# Patient Record
Sex: MALE | Race: WHITE | Employment: FULL TIME | URBAN - METROPOLITAN AREA
[De-identification: names, ages, dates, MRNs, and addresses within clinical notes are randomized per-mention and may not be internally consistent; named-entity substitution may affect disease eponyms.]

---

## 2017-01-05 ENCOUNTER — TELEPHONE (OUTPATIENT)
Dept: BARIATRICS/WEIGHT MGMT | Age: 52
End: 2017-01-05

## 2017-01-05 ENCOUNTER — TELEPHONE (OUTPATIENT)
Dept: FAMILY MEDICINE CLINIC | Age: 52
End: 2017-01-05

## 2017-01-06 ENCOUNTER — OFFICE VISIT (OUTPATIENT)
Dept: FAMILY MEDICINE CLINIC | Age: 52
End: 2017-01-06

## 2017-01-06 VITALS
DIASTOLIC BLOOD PRESSURE: 85 MMHG | HEIGHT: 70 IN | BODY MASS INDEX: 44.24 KG/M2 | HEART RATE: 63 BPM | SYSTOLIC BLOOD PRESSURE: 140 MMHG | OXYGEN SATURATION: 99 % | RESPIRATION RATE: 16 BRPM | WEIGHT: 309 LBS

## 2017-01-06 DIAGNOSIS — G47.33 OSA (OBSTRUCTIVE SLEEP APNEA): ICD-10-CM

## 2017-01-06 DIAGNOSIS — I89.0 LYMPHEDEMA: ICD-10-CM

## 2017-01-06 DIAGNOSIS — E11.69 DIABETES MELLITUS TYPE 2 IN OBESE (HCC): ICD-10-CM

## 2017-01-06 DIAGNOSIS — I10 ESSENTIAL HYPERTENSION: ICD-10-CM

## 2017-01-06 DIAGNOSIS — Z98.84 S/P LAPAROSCOPIC SLEEVE GASTRECTOMY: ICD-10-CM

## 2017-01-06 DIAGNOSIS — E66.9 DIABETES MELLITUS TYPE 2 IN OBESE (HCC): ICD-10-CM

## 2017-01-06 DIAGNOSIS — E66.01 OBESITY, MORBID, BMI 50 OR HIGHER (HCC): Primary | ICD-10-CM

## 2017-01-06 PROCEDURE — 99214 OFFICE O/P EST MOD 30 MIN: CPT | Performed by: FAMILY MEDICINE

## 2017-01-16 RX ORDER — GABAPENTIN 300 MG/1
CAPSULE ORAL
Qty: 90 CAPSULE | Refills: 4 | Status: SHIPPED | OUTPATIENT
Start: 2017-01-16 | End: 2017-05-30 | Stop reason: SDUPTHER

## 2017-02-02 ENCOUNTER — OFFICE VISIT (OUTPATIENT)
Dept: BARIATRICS/WEIGHT MGMT | Age: 52
End: 2017-02-02

## 2017-02-02 VITALS
BODY MASS INDEX: 43.67 KG/M2 | RESPIRATION RATE: 16 BRPM | SYSTOLIC BLOOD PRESSURE: 123 MMHG | WEIGHT: 305 LBS | DIASTOLIC BLOOD PRESSURE: 72 MMHG | HEIGHT: 70 IN | HEART RATE: 68 BPM

## 2017-02-02 DIAGNOSIS — G47.33 OSA (OBSTRUCTIVE SLEEP APNEA): ICD-10-CM

## 2017-02-02 DIAGNOSIS — E66.9 DIABETES MELLITUS TYPE 2 IN OBESE (HCC): ICD-10-CM

## 2017-02-02 DIAGNOSIS — E11.69 DIABETES MELLITUS TYPE 2 IN OBESE (HCC): ICD-10-CM

## 2017-02-02 DIAGNOSIS — E66.01 MORBID OBESITY WITH BMI OF 45.0-49.9, ADULT (HCC): Primary | ICD-10-CM

## 2017-02-02 DIAGNOSIS — Z98.84 S/P LAPAROSCOPIC SLEEVE GASTRECTOMY: ICD-10-CM

## 2017-02-02 PROCEDURE — 99024 POSTOP FOLLOW-UP VISIT: CPT | Performed by: SURGERY

## 2017-03-13 ENCOUNTER — OFFICE VISIT (OUTPATIENT)
Dept: BARIATRICS/WEIGHT MGMT | Age: 52
End: 2017-03-13

## 2017-03-13 VITALS
BODY MASS INDEX: 41.59 KG/M2 | HEART RATE: 60 BPM | RESPIRATION RATE: 16 BRPM | DIASTOLIC BLOOD PRESSURE: 70 MMHG | HEIGHT: 70 IN | WEIGHT: 290.5 LBS | SYSTOLIC BLOOD PRESSURE: 136 MMHG

## 2017-03-13 DIAGNOSIS — Z98.84 S/P LAPAROSCOPIC SLEEVE GASTRECTOMY: Primary | ICD-10-CM

## 2017-03-13 DIAGNOSIS — E11.69 DIABETES MELLITUS TYPE 2 IN OBESE (HCC): ICD-10-CM

## 2017-03-13 DIAGNOSIS — I10 ESSENTIAL HYPERTENSION: ICD-10-CM

## 2017-03-13 DIAGNOSIS — G47.33 OSA (OBSTRUCTIVE SLEEP APNEA): ICD-10-CM

## 2017-03-13 DIAGNOSIS — E66.01 MORBID OBESITY WITH BMI OF 45.0-49.9, ADULT (HCC): ICD-10-CM

## 2017-03-13 DIAGNOSIS — E66.9 DIABETES MELLITUS TYPE 2 IN OBESE (HCC): ICD-10-CM

## 2017-03-13 PROCEDURE — 99024 POSTOP FOLLOW-UP VISIT: CPT | Performed by: NURSE PRACTITIONER

## 2017-03-13 ASSESSMENT — ENCOUNTER SYMPTOMS
GASTROINTESTINAL NEGATIVE: 1
RESPIRATORY NEGATIVE: 1
EYES NEGATIVE: 1
ALLERGIC/IMMUNOLOGIC NEGATIVE: 1

## 2017-05-01 ENCOUNTER — OFFICE VISIT (OUTPATIENT)
Dept: FAMILY MEDICINE CLINIC | Age: 52
End: 2017-05-01

## 2017-05-01 VITALS
WEIGHT: 268 LBS | HEIGHT: 70 IN | SYSTOLIC BLOOD PRESSURE: 136 MMHG | OXYGEN SATURATION: 98 % | BODY MASS INDEX: 38.37 KG/M2 | RESPIRATION RATE: 14 BRPM | HEART RATE: 68 BPM | DIASTOLIC BLOOD PRESSURE: 80 MMHG

## 2017-05-01 DIAGNOSIS — E11.69 DIABETES MELLITUS TYPE 2 IN OBESE (HCC): Primary | ICD-10-CM

## 2017-05-01 DIAGNOSIS — Z98.84 S/P LAPAROSCOPIC SLEEVE GASTRECTOMY: ICD-10-CM

## 2017-05-01 DIAGNOSIS — I89.0 LYMPHEDEMA: ICD-10-CM

## 2017-05-01 DIAGNOSIS — G47.33 OSA (OBSTRUCTIVE SLEEP APNEA): ICD-10-CM

## 2017-05-01 DIAGNOSIS — E66.9 DIABETES MELLITUS TYPE 2 IN OBESE (HCC): Primary | ICD-10-CM

## 2017-05-01 DIAGNOSIS — I10 ESSENTIAL HYPERTENSION: ICD-10-CM

## 2017-05-01 DIAGNOSIS — E66.01 MORBID OBESITY WITH BMI OF 40.0-44.9, ADULT (HCC): ICD-10-CM

## 2017-05-01 LAB — HBA1C MFR BLD: 4.7 %

## 2017-05-01 PROCEDURE — 83036 HEMOGLOBIN GLYCOSYLATED A1C: CPT | Performed by: FAMILY MEDICINE

## 2017-05-01 PROCEDURE — 99214 OFFICE O/P EST MOD 30 MIN: CPT | Performed by: FAMILY MEDICINE

## 2017-05-30 RX ORDER — GABAPENTIN 300 MG/1
CAPSULE ORAL
Qty: 90 CAPSULE | Refills: 4 | Status: SHIPPED | OUTPATIENT
Start: 2017-05-30 | End: 2017-10-15 | Stop reason: SDUPTHER

## 2017-06-14 ENCOUNTER — OFFICE VISIT (OUTPATIENT)
Dept: BARIATRICS/WEIGHT MGMT | Age: 52
End: 2017-06-14

## 2017-06-14 VITALS
DIASTOLIC BLOOD PRESSURE: 70 MMHG | HEART RATE: 60 BPM | SYSTOLIC BLOOD PRESSURE: 120 MMHG | HEIGHT: 70 IN | WEIGHT: 257 LBS | RESPIRATION RATE: 16 BRPM | BODY MASS INDEX: 36.79 KG/M2

## 2017-06-14 DIAGNOSIS — E66.9 OBESITY (BMI 30-39.9): ICD-10-CM

## 2017-06-14 DIAGNOSIS — E11.69 DIABETES MELLITUS TYPE 2 IN OBESE (HCC): ICD-10-CM

## 2017-06-14 DIAGNOSIS — E66.9 DIABETES MELLITUS TYPE 2 IN OBESE (HCC): ICD-10-CM

## 2017-06-14 DIAGNOSIS — I10 ESSENTIAL HYPERTENSION: ICD-10-CM

## 2017-06-14 DIAGNOSIS — Z98.84 S/P LAPAROSCOPIC SLEEVE GASTRECTOMY: Primary | ICD-10-CM

## 2017-06-14 PROCEDURE — 99213 OFFICE O/P EST LOW 20 MIN: CPT | Performed by: NURSE PRACTITIONER

## 2017-06-14 ASSESSMENT — ENCOUNTER SYMPTOMS
ALLERGIC/IMMUNOLOGIC NEGATIVE: 1
RESPIRATORY NEGATIVE: 1
GASTROINTESTINAL NEGATIVE: 1
EYES NEGATIVE: 1

## 2017-06-21 ENCOUNTER — HOSPITAL ENCOUNTER (OUTPATIENT)
Dept: OTHER | Age: 52
Discharge: HOME | End: 2017-06-21
Attending: NURSE PRACTITIONER | Admitting: NURSE PRACTITIONER

## 2017-06-21 DIAGNOSIS — Z98.84 S/P LAPAROSCOPIC SLEEVE GASTRECTOMY: ICD-10-CM

## 2017-06-21 DIAGNOSIS — E66.9 OBESITY (BMI 30-39.9): ICD-10-CM

## 2017-06-21 DIAGNOSIS — E66.9 DIABETES MELLITUS TYPE 2 IN OBESE (HCC): ICD-10-CM

## 2017-06-21 DIAGNOSIS — E11.69 DIABETES MELLITUS TYPE 2 IN OBESE (HCC): ICD-10-CM

## 2017-06-21 LAB
A/G RATIO: 1.2 (ref 1.1–2.2)
ALBUMIN SERPL-MCNC: 3.9 G/DL (ref 3.4–5)
ALP BLD-CCNC: 102 U/L (ref 40–129)
ALT SERPL-CCNC: 10 U/L (ref 10–40)
ANION GAP SERPL CALCULATED.3IONS-SCNC: 13 MMOL/L (ref 3–16)
AST SERPL-CCNC: 16 U/L (ref 15–37)
BASOPHILS ABSOLUTE: 0 K/UL (ref 0–0.2)
BASOPHILS RELATIVE PERCENT: 0.4 %
BILIRUB SERPL-MCNC: 1.3 MG/DL (ref 0–1)
BUN BLDV-MCNC: 17 MG/DL (ref 7–20)
CALCIUM SERPL-MCNC: 9.1 MG/DL (ref 8.3–10.6)
CHLORIDE BLD-SCNC: 101 MMOL/L (ref 99–110)
CHOLESTEROL, TOTAL: 106 MG/DL (ref 0–199)
CO2: 25 MMOL/L (ref 21–32)
CREAT SERPL-MCNC: 0.8 MG/DL (ref 0.9–1.3)
EOSINOPHILS ABSOLUTE: 0.1 K/UL (ref 0–0.6)
EOSINOPHILS RELATIVE PERCENT: 1.3 %
FOLATE: 16.78 NG/ML (ref 4.78–24.2)
GFR AFRICAN AMERICAN: >60
GFR NON-AFRICAN AMERICAN: >60
GLOBULIN: 3.2 G/DL
GLUCOSE BLD-MCNC: 104 MG/DL (ref 70–99)
HCT VFR BLD CALC: 38.8 % (ref 40.5–52.5)
HDLC SERPL-MCNC: 42 MG/DL (ref 40–60)
HEMOGLOBIN: 12.8 G/DL (ref 13.5–17.5)
IRON SATURATION: 42 % (ref 20–50)
IRON: 132 UG/DL (ref 59–158)
LDL CHOLESTEROL CALCULATED: 52 MG/DL
LYMPHOCYTES ABSOLUTE: 1.6 K/UL (ref 1–5.1)
LYMPHOCYTES RELATIVE PERCENT: 29.2 %
MCH RBC QN AUTO: 31.9 PG (ref 26–34)
MCHC RBC AUTO-ENTMCNC: 32.9 G/DL (ref 31–36)
MCV RBC AUTO: 96.9 FL (ref 80–100)
MONOCYTES ABSOLUTE: 0.3 K/UL (ref 0–1.3)
MONOCYTES RELATIVE PERCENT: 5.9 %
NEUTROPHILS ABSOLUTE: 3.4 K/UL (ref 1.7–7.7)
NEUTROPHILS RELATIVE PERCENT: 63.2 %
PDW BLD-RTO: 12.5 % (ref 12.4–15.4)
PLATELET # BLD: 221 K/UL (ref 135–450)
PMV BLD AUTO: 7.8 FL (ref 5–10.5)
POTASSIUM SERPL-SCNC: 4.6 MMOL/L (ref 3.5–5.1)
RBC # BLD: 4 M/UL (ref 4.2–5.9)
SODIUM BLD-SCNC: 139 MMOL/L (ref 136–145)
TOTAL IRON BINDING CAPACITY: 311 UG/DL (ref 260–445)
TOTAL PROTEIN: 7.1 G/DL (ref 6.4–8.2)
TRIGL SERPL-MCNC: 59 MG/DL (ref 0–150)
TSH REFLEX: 0.73 UIU/ML (ref 0.27–4.2)
VITAMIN B-12: 361 PG/ML (ref 211–911)
VITAMIN D 25-HYDROXY: 37.6 NG/ML
VLDLC SERPL CALC-MCNC: 12 MG/DL
WBC # BLD: 5.5 K/UL (ref 4–11)

## 2017-06-29 ENCOUNTER — TELEPHONE (OUTPATIENT)
Dept: BARIATRICS/WEIGHT MGMT | Age: 52
End: 2017-06-29

## 2017-06-29 DIAGNOSIS — R17 ELEVATED BILIRUBIN: Primary | ICD-10-CM

## 2017-07-12 RX ORDER — VERAPAMIL HYDROCHLORIDE 120 MG/1
120 TABLET, FILM COATED ORAL DAILY
Qty: 30 TABLET | Refills: 5 | Status: SHIPPED | OUTPATIENT
Start: 2017-07-12 | End: 2018-01-29 | Stop reason: SDUPTHER

## 2017-07-19 ENCOUNTER — TELEPHONE (OUTPATIENT)
Dept: FAMILY MEDICINE CLINIC | Age: 52
End: 2017-07-19

## 2017-09-14 ENCOUNTER — OFFICE VISIT (OUTPATIENT)
Dept: CARDIOLOGY CLINIC | Age: 52
End: 2017-09-14

## 2017-09-14 ENCOUNTER — OFFICE VISIT (OUTPATIENT)
Dept: BARIATRICS/WEIGHT MGMT | Age: 52
End: 2017-09-14

## 2017-09-14 VITALS
WEIGHT: 235 LBS | HEIGHT: 70 IN | DIASTOLIC BLOOD PRESSURE: 76 MMHG | BODY MASS INDEX: 33.64 KG/M2 | HEART RATE: 60 BPM | RESPIRATION RATE: 16 BRPM | SYSTOLIC BLOOD PRESSURE: 128 MMHG

## 2017-09-14 VITALS
BODY MASS INDEX: 34.08 KG/M2 | WEIGHT: 237.5 LBS | DIASTOLIC BLOOD PRESSURE: 72 MMHG | HEART RATE: 61 BPM | SYSTOLIC BLOOD PRESSURE: 118 MMHG

## 2017-09-14 DIAGNOSIS — G47.33 OSA (OBSTRUCTIVE SLEEP APNEA): ICD-10-CM

## 2017-09-14 DIAGNOSIS — E66.9 OBESITY (BMI 30-39.9): ICD-10-CM

## 2017-09-14 DIAGNOSIS — E55.9 VITAMIN D DEFICIENCY: ICD-10-CM

## 2017-09-14 DIAGNOSIS — I10 ESSENTIAL HYPERTENSION: ICD-10-CM

## 2017-09-14 DIAGNOSIS — E11.69 DIABETES MELLITUS TYPE 2 IN OBESE (HCC): ICD-10-CM

## 2017-09-14 DIAGNOSIS — R17 ELEVATED BILIRUBIN: ICD-10-CM

## 2017-09-14 DIAGNOSIS — Z98.84 S/P LAPAROSCOPIC SLEEVE GASTRECTOMY: Primary | ICD-10-CM

## 2017-09-14 DIAGNOSIS — I47.20 VENTRICULAR TACHYCARDIA: Primary | ICD-10-CM

## 2017-09-14 DIAGNOSIS — E66.9 DIABETES MELLITUS TYPE 2 IN OBESE (HCC): ICD-10-CM

## 2017-09-14 LAB
ALBUMIN SERPL-MCNC: 4.2 G/DL (ref 3.4–5)
ALP BLD-CCNC: 106 U/L (ref 40–129)
ALT SERPL-CCNC: 11 U/L (ref 10–40)
AST SERPL-CCNC: 18 U/L (ref 15–37)
BILIRUB SERPL-MCNC: 1.4 MG/DL (ref 0–1)
BILIRUBIN DIRECT: 0.3 MG/DL (ref 0–0.3)
BILIRUBIN, INDIRECT: 1.1 MG/DL (ref 0–1)
TOTAL PROTEIN: 7 G/DL (ref 6.4–8.2)

## 2017-09-14 PROCEDURE — 93000 ELECTROCARDIOGRAM COMPLETE: CPT | Performed by: NURSE PRACTITIONER

## 2017-09-14 PROCEDURE — 99213 OFFICE O/P EST LOW 20 MIN: CPT | Performed by: NURSE PRACTITIONER

## 2017-09-14 ASSESSMENT — ENCOUNTER SYMPTOMS
BACK PAIN: 1
RESPIRATORY NEGATIVE: 1
CONSTIPATION: 1

## 2017-10-05 ENCOUNTER — TELEPHONE (OUTPATIENT)
Dept: BARIATRICS/WEIGHT MGMT | Age: 52
End: 2017-10-05

## 2017-10-16 RX ORDER — GABAPENTIN 300 MG/1
CAPSULE ORAL
Qty: 90 CAPSULE | Refills: 10 | Status: SHIPPED | OUTPATIENT
Start: 2017-10-16 | End: 2018-10-17 | Stop reason: SDUPTHER

## 2017-11-21 NOTE — TELEPHONE ENCOUNTER
Medication and Quantity requested: metFORMIN (GLUCOPHAGE) 500MG tablet  Qty 180    Last Visit  5/1/17    Pharmacy and phone number updated in [facility]:  yes

## 2017-11-21 NOTE — TELEPHONE ENCOUNTER
Last OV   01/06/2017 obesity foot swelling   Last Refill  06/15/2016 # 180 3 refills   Lab Results   Component Value Date    LABA1C 4.7 05/01/2017     Lab Results   Component Value Date    .4 12/03/2016

## 2017-12-07 ENCOUNTER — OFFICE VISIT (OUTPATIENT)
Dept: BARIATRICS/WEIGHT MGMT | Age: 52
End: 2017-12-07

## 2017-12-07 ENCOUNTER — HOSPITAL ENCOUNTER (OUTPATIENT)
Dept: OTHER | Age: 52
Discharge: HOME | End: 2017-12-07
Attending: SURGERY | Admitting: SURGERY

## 2017-12-07 VITALS
BODY MASS INDEX: 31.09 KG/M2 | RESPIRATION RATE: 16 BRPM | HEIGHT: 70 IN | DIASTOLIC BLOOD PRESSURE: 78 MMHG | SYSTOLIC BLOOD PRESSURE: 124 MMHG | HEART RATE: 88 BPM | WEIGHT: 217.2 LBS

## 2017-12-07 DIAGNOSIS — Z98.84 S/P LAPAROSCOPIC SLEEVE GASTRECTOMY: ICD-10-CM

## 2017-12-07 DIAGNOSIS — E66.9 OBESITY (BMI 30-39.9): Primary | ICD-10-CM

## 2017-12-07 DIAGNOSIS — E66.9 OBESITY (BMI 30-39.9): ICD-10-CM

## 2017-12-07 DIAGNOSIS — I10 ESSENTIAL HYPERTENSION: ICD-10-CM

## 2017-12-07 DIAGNOSIS — E66.9 DIABETES MELLITUS TYPE 2 IN OBESE (HCC): ICD-10-CM

## 2017-12-07 DIAGNOSIS — E11.69 DIABETES MELLITUS TYPE 2 IN OBESE (HCC): ICD-10-CM

## 2017-12-07 LAB
A/G RATIO: 1.4 (ref 1.1–2.2)
ALBUMIN SERPL-MCNC: 4 G/DL (ref 3.4–5)
ALP BLD-CCNC: 105 U/L (ref 40–129)
ALT SERPL-CCNC: 10 U/L (ref 10–40)
ANION GAP SERPL CALCULATED.3IONS-SCNC: 10 MMOL/L (ref 3–16)
AST SERPL-CCNC: 16 U/L (ref 15–37)
BASOPHILS ABSOLUTE: 0 K/UL (ref 0–0.2)
BASOPHILS RELATIVE PERCENT: 0.5 %
BILIRUB SERPL-MCNC: 1.5 MG/DL (ref 0–1)
BUN BLDV-MCNC: 16 MG/DL (ref 7–20)
CALCIUM SERPL-MCNC: 9.3 MG/DL (ref 8.3–10.6)
CHLORIDE BLD-SCNC: 105 MMOL/L (ref 99–110)
CHOLESTEROL, TOTAL: 125 MG/DL (ref 0–199)
CO2: 28 MMOL/L (ref 21–32)
CREAT SERPL-MCNC: 0.8 MG/DL (ref 0.9–1.3)
EOSINOPHILS ABSOLUTE: 0.1 K/UL (ref 0–0.6)
EOSINOPHILS RELATIVE PERCENT: 3.1 %
ESTIMATED AVERAGE GLUCOSE: 91.1 MG/DL
FOLATE: >20 NG/ML (ref 4.78–24.2)
GFR AFRICAN AMERICAN: >60
GFR NON-AFRICAN AMERICAN: >60
GLOBULIN: 2.9 G/DL
GLUCOSE BLD-MCNC: 97 MG/DL (ref 70–99)
HBA1C MFR BLD: 4.8 %
HCT VFR BLD CALC: 39.9 % (ref 40.5–52.5)
HDLC SERPL-MCNC: 61 MG/DL (ref 40–60)
HEMOGLOBIN: 13.3 G/DL (ref 13.5–17.5)
IRON SATURATION: 51 % (ref 20–50)
IRON: 159 UG/DL (ref 59–158)
LDL CHOLESTEROL CALCULATED: 56 MG/DL
LYMPHOCYTES ABSOLUTE: 1.6 K/UL (ref 1–5.1)
LYMPHOCYTES RELATIVE PERCENT: 34.3 %
MCH RBC QN AUTO: 32.9 PG (ref 26–34)
MCHC RBC AUTO-ENTMCNC: 33.3 G/DL (ref 31–36)
MCV RBC AUTO: 98.8 FL (ref 80–100)
MONOCYTES ABSOLUTE: 0.4 K/UL (ref 0–1.3)
MONOCYTES RELATIVE PERCENT: 8.4 %
NEUTROPHILS ABSOLUTE: 2.5 K/UL (ref 1.7–7.7)
NEUTROPHILS RELATIVE PERCENT: 53.7 %
PDW BLD-RTO: 12.6 % (ref 12.4–15.4)
PLATELET # BLD: 224 K/UL (ref 135–450)
PMV BLD AUTO: 7.6 FL (ref 5–10.5)
POTASSIUM SERPL-SCNC: 5 MMOL/L (ref 3.5–5.1)
RBC # BLD: 4.04 M/UL (ref 4.2–5.9)
SODIUM BLD-SCNC: 143 MMOL/L (ref 136–145)
TOTAL IRON BINDING CAPACITY: 311 UG/DL (ref 260–445)
TOTAL PROTEIN: 6.9 G/DL (ref 6.4–8.2)
TRIGL SERPL-MCNC: 41 MG/DL (ref 0–150)
TSH REFLEX: 0.47 UIU/ML (ref 0.27–4.2)
VITAMIN B-12: 453 PG/ML (ref 211–911)
VITAMIN D 25-HYDROXY: 38.9 NG/ML
VLDLC SERPL CALC-MCNC: 8 MG/DL
WBC # BLD: 4.6 K/UL (ref 4–11)

## 2017-12-07 PROCEDURE — 99213 OFFICE O/P EST LOW 20 MIN: CPT | Performed by: SURGERY

## 2017-12-07 NOTE — PROGRESS NOTES
Comment: started smoking at age 14 - smoked up to 1 p.p.d     Alcohol use No     I counseled the patient on the importance of not smoking and risks of ETOH. Allergies   Allergen Reactions    Lisinopril Hives    Amoxicillin-Pot Clavulanate Diarrhea     Vitals:    12/07/17 0908   BP: 124/78   Pulse: 88   Resp: 16   Weight: 217 lb 3.2 oz (98.5 kg)   Height: 5' 10\" (1.778 m)       Body mass index is 31.16 kg/m². Current Outpatient Prescriptions:     metFORMIN (GLUCOPHAGE) 1000 MG tablet, TAKE 1/2 TABLET TWICE A DAY WITH MEALS, Disp: 180 tablet, Rfl: 3    gabapentin (NEURONTIN) 300 MG capsule, TAKE 1 CAPSULE THREE TIMES A DAY, Disp: 90 capsule, Rfl: 10    Calcium Citrate-Vitamin D (CALCIUM CITRATE + D3 PO), Take 1 tablet by mouth daily, Disp: , Rfl:     verapamil (CALAN) 120 MG tablet, Take 1 tablet by mouth daily, Disp: 30 tablet, Rfl: 5    glucose blood VI test strips (ASCENSIA AUTODISC VI;ONE TOUCH ULTRA TEST VI) strip, 1 each by In Vitro route daily As needed. , Disp: 100 each, Rfl: 3    Multiple Vitamins-Minerals (BARIATRIC FUSION) CHEW, Take 3 tablets by mouth daily, Disp: , Rfl:     metoprolol succinate (TOPROL XL) 25 MG extended release tablet, Take 1 tablet by mouth daily, Disp: 90 tablet, Rfl: 3    glucose blood VI test strips (ASCENSIA AUTODISC VI;ONE TOUCH ULTRA TEST VI) strip, 1 each by In Vitro route 2 times daily As needed. , Disp: 100 each, Rfl: 3    BD ULTRA-FINE PEN NEEDLES 29G X 12.7MM MISC, USE TWICE A DAY AS DIRECTED, Disp: 180 each, Rfl: 2    LifeScan Unistik II Lancets MISC, 1 each by Does not apply route 2 times daily, Disp: 300 each, Rfl: 3    aspirin 81 MG tablet, Take 81 mg by mouth daily, Disp: , Rfl:       Review of Systems - History obtained from the patient  General ROS: negative  Psychological ROS: negative  Ophthalmic ROS: negative  Neurological ROS: negative  ENT ROS: negative  Allergy and Immunology ROS: negative  Hematological and Lymphatic ROS: Future  -     Comprehensive Metabolic Panel; Future  -     Lipid Panel; Future  -     TSH with Reflex; Future  -     Iron and TIBC; Future  -     Vitamin B12 & Folate; Future  -     Vitamin D 25 Hydroxy; Future  -     Hemoglobin A1C; Future    S/P laparoscopic sleeve gastrectomy  -     CBC with Differential; Future  -     Comprehensive Metabolic Panel; Future  -     Lipid Panel; Future  -     TSH with Reflex; Future  -     Iron and TIBC; Future  -     Vitamin B12 & Folate; Future  -     Vitamin D 25 Hydroxy; Future  -     Hemoglobin A1C; Future    Diabetes mellitus type 2 in obese (HCC)  -     CBC with Differential; Future  -     Comprehensive Metabolic Panel; Future  -     Lipid Panel; Future  -     TSH with Reflex; Future  -     Iron and TIBC; Future  -     Vitamin B12 & Folate; Future  -     Vitamin D 25 Hydroxy; Future  -     Hemoglobin A1C; Future    Essential hypertension  -     CBC with Differential; Future  -     Comprehensive Metabolic Panel; Future  -     Lipid Panel; Future  -     TSH with Reflex; Future  -     Iron and TIBC; Future  -     Vitamin B12 & Folate; Future  -     Vitamin D 25 Hydroxy; Future  -     Hemoglobin A1C; Future          Nadia Kan is 47 y.o. male , now with Body mass index is 31.16 kg/m². s/p Sleeve gastrectomy, has lost 18 lbs since last visit, total of 156 lbs weight loss. The patient underwent dietary counseling with registered dietician. I have reviewed, discussed and agree with the dietary plan. Patient is trying hard to keep good dietary and behavior modifications. Patient is monitoring portion sizes, food choices and liquid calories. Patient is trying to exercise regularly. Patient pleased with the surgery outcomes.   We discussed how his weight affects his overall health including:  Patient Active Problem List   Diagnosis    Diabetes mellitus type 2 in obese ([facility] [address]    Tobacco use disorder, continuous    Essential hypertension    Stasis dermatitis    Lymphedema  Vitamin D deficiency    HANNAH (obstructive sleep apnea)    Venous stasis ulcers of both lower extremities (HCC)    Ventricular tachycardia ([address]    Encounter for preprocedural cardiovascular examination    S/P laparoscopic sleeve gastrectomy    Obesity (BMI 30-39. 9)    Elevated bilirubin    and importance of weight loss to alleviate those co morbid conditions. I encouraged the patient to continue exercise and keeping healthy eating habits. Also counseled the patient extensively on post surgery care. I spent a total of 15 minutes face to face with the patient and greater than 50% of the time was spent in counseling, answering questions, addressing concerns, reviewing labs and/or other studies with the patient as well as coordinating care. Troy Dewey is making great progress , his down 155 lbs so far in 1 year, keeps good dietary habits. RTC in 6 months  Diet and Exercise   Nutrition labs     Patient advised that its their responsibility to follow up for studies, referrals and/or labs ordered today.

## 2017-12-07 NOTE — PATIENT INSTRUCTIONS
Patient received dietary handouts and education.     Goals:   - Monitor portion sizes - 8-12 oz/meal  - Increase meal time to 30 minutes  - Increase exercise to 2/week and increase intensity as able

## 2017-12-10 DIAGNOSIS — E11.9 TYPE 2 DIABETES MELLITUS WITHOUT COMPLICATION (HCC): ICD-10-CM

## 2017-12-11 RX ORDER — INSULIN GLARGINE 300 U/ML
INJECTION, SOLUTION SUBCUTANEOUS
Qty: 22.5 ML | Refills: 2 | Status: SHIPPED | OUTPATIENT
Start: 2017-12-11 | End: 2018-03-22 | Stop reason: ALTCHOICE

## 2017-12-11 RX ORDER — PEN NEEDLE, DIABETIC 29 G X1/2"
NEEDLE, DISPOSABLE MISCELLANEOUS
Qty: 180 EACH | Refills: 2 | Status: SHIPPED | OUTPATIENT
Start: 2017-12-11 | End: 2018-04-24 | Stop reason: ALTCHOICE

## 2017-12-11 NOTE — TELEPHONE ENCOUNTER
Patient has an appt scheduled for 1/8/2018 with Dr. Miracle Villegas. Patient made of aware what he needs to bring in to his next appointment.

## 2018-01-03 RX ORDER — METOPROLOL SUCCINATE 25 MG/1
TABLET, EXTENDED RELEASE ORAL
Qty: 90 TABLET | Refills: 2 | Status: SHIPPED | OUTPATIENT
Start: 2018-01-03 | End: 2018-01-08 | Stop reason: ALTCHOICE

## 2018-01-08 ENCOUNTER — OFFICE VISIT (OUTPATIENT)
Dept: FAMILY MEDICINE CLINIC | Age: 53
End: 2018-01-08

## 2018-01-08 VITALS
SYSTOLIC BLOOD PRESSURE: 138 MMHG | BODY MASS INDEX: 31.07 KG/M2 | HEART RATE: 69 BPM | RESPIRATION RATE: 14 BRPM | WEIGHT: 217 LBS | DIASTOLIC BLOOD PRESSURE: 78 MMHG | HEIGHT: 70 IN | OXYGEN SATURATION: 97 %

## 2018-01-08 DIAGNOSIS — E66.9 DIABETES MELLITUS TYPE 2 IN OBESE (HCC): Primary | ICD-10-CM

## 2018-01-08 DIAGNOSIS — I89.0 LYMPHEDEMA: ICD-10-CM

## 2018-01-08 DIAGNOSIS — G47.33 OSA (OBSTRUCTIVE SLEEP APNEA): ICD-10-CM

## 2018-01-08 DIAGNOSIS — I10 ESSENTIAL HYPERTENSION: ICD-10-CM

## 2018-01-08 DIAGNOSIS — E66.9 OBESITY (BMI 30-39.9): ICD-10-CM

## 2018-01-08 DIAGNOSIS — E11.69 DIABETES MELLITUS TYPE 2 IN OBESE (HCC): Primary | ICD-10-CM

## 2018-01-08 PROCEDURE — 99214 OFFICE O/P EST MOD 30 MIN: CPT | Performed by: FAMILY MEDICINE

## 2018-01-08 ASSESSMENT — PATIENT HEALTH QUESTIONNAIRE - PHQ9
SUM OF ALL RESPONSES TO PHQ9 QUESTIONS 1 & 2: 0
2. FEELING DOWN, DEPRESSED OR HOPELESS: 0
1. LITTLE INTEREST OR PLEASURE IN DOING THINGS: 0
SUM OF ALL RESPONSES TO PHQ QUESTIONS 1-9: 0

## 2018-03-22 ENCOUNTER — OFFICE VISIT (OUTPATIENT)
Dept: FAMILY MEDICINE CLINIC | Age: 53
End: 2018-03-22

## 2018-03-22 VITALS
HEART RATE: 75 BPM | RESPIRATION RATE: 15 BRPM | BODY MASS INDEX: 30.21 KG/M2 | DIASTOLIC BLOOD PRESSURE: 76 MMHG | HEIGHT: 70 IN | OXYGEN SATURATION: 96 % | WEIGHT: 211 LBS | SYSTOLIC BLOOD PRESSURE: 110 MMHG | TEMPERATURE: 97.9 F

## 2018-03-22 DIAGNOSIS — R39.198 DECREASED URINE STREAM: ICD-10-CM

## 2018-03-22 DIAGNOSIS — M54.50 BILATERAL LOW BACK PAIN WITHOUT SCIATICA, UNSPECIFIED CHRONICITY: ICD-10-CM

## 2018-03-22 DIAGNOSIS — J02.9 SORE THROAT: Primary | ICD-10-CM

## 2018-03-22 DIAGNOSIS — J06.9 UPPER RESPIRATORY TRACT INFECTION, UNSPECIFIED TYPE: ICD-10-CM

## 2018-03-22 DIAGNOSIS — R05.9 COUGH: ICD-10-CM

## 2018-03-22 PROCEDURE — 99214 OFFICE O/P EST MOD 30 MIN: CPT | Performed by: FAMILY MEDICINE

## 2018-03-22 RX ORDER — AZITHROMYCIN 250 MG/1
TABLET, FILM COATED ORAL
Qty: 6 TABLET | Refills: 0 | Status: SHIPPED | OUTPATIENT
Start: 2018-03-22 | End: 2018-04-24 | Stop reason: ALTCHOICE

## 2018-03-22 RX ORDER — BENZONATATE 100 MG/1
100 CAPSULE ORAL 3 TIMES DAILY PRN
Qty: 15 CAPSULE | Refills: 0 | Status: SHIPPED | OUTPATIENT
Start: 2018-03-22 | End: 2018-03-27

## 2018-03-22 NOTE — PATIENT INSTRUCTIONS
Patient Education        Benign Prostatic Hyperplasia: Care Instructions  Your Care Instructions    Benign prostatic hyperplasia, or BPH, is an enlarged prostate gland. The prostate is a small gland that makes some of the fluid in semen. Prostate enlargement happens to almost all men as they age. It is usually not serious. BPH does not cause prostate cancer. As the prostate gets bigger, it may partly block the flow of urine. You may have a hard time getting a urine stream started or completely stopped. BPH can cause dribbling. You may have a weak urine stream, or you may have to urinate more often than you used to, especially at night. Most men find these problems easy to manage. You do not need treatment unless your symptoms bother you a lot or you have other problems, such as bladder infections or stones. In these cases, medicines may help. Surgery is not needed unless the urine flow is blocked or the symptoms do not get better with medicine. Follow-up care is a key part of your treatment and safety. Be sure to make and go to all appointments, and call your doctor if you are having problems. It's also a good idea to know your test results and keep a list of the medicines you take. How can you care for yourself at home? · Take plenty of time to urinate. Try to relax. · Try \"double voiding. \" Urinate as much you can, relax for a few moments, and then try to urinate again. · Sit on the toilet to urinate. · Read or think of other things while you are waiting. · Turn on a faucet, or try to picture running water. Some men find that this helps get their urine flowing. · If dribbling is a problem, wash your penis daily to avoid skin irritation and infection. · Avoid caffeine and alcohol. These drinks will increase how often you need to urinate. Spread your fluid intake throughout the day. If the urge to urinate often wakes you at night, limit your fluid intake in the evening.  Urinate right before you go to bed.  · Many over-the-counter cold and allergy medicines can make the symptoms of BPH worse. Avoid antihistamines, decongestants, and allergy pills, if you can. Read the warnings on the package. · If you take any prescription medicines, especially tranquilizers or antidepressants, ask your doctor or pharmacist whether they can cause urination problems. There may be other medicines you can use that do not cause urinary problems. · Be safe with medicines. Take your medicines exactly as prescribed. Call your doctor if you think you are having a problem with your medicine. When should you call for help? Call your doctor now or seek immediate medical care if:  ? · You cannot urinate at all. ? · You have symptoms of a urinary infection. For example:  ¨ You have blood or pus in your urine. ¨ You have pain in your back just below your rib cage. This is called flank pain. ¨ You have a fever, chills, or body aches. ¨ It hurts to urinate. ¨ You have groin or belly pain. ? Watch closely for changes in your health, and be sure to contact your doctor if:  ? · It hurts when you ejaculate. ? · Your urinary problems get a lot worse or bother you a lot. Where can you learn more? Go to [email or web address]://[email or web address].[email or web address] org and sign in to your [employer] account. Enter Y029 in the [facility] box to learn more about \"Benign Prostatic Hyperplasia: Care Instructions. \"     If you do not have an account, please click on the \"Sign Up Now\" link. Current as of: March 14, 2017  Content Version: 11.5  © 0325-4319 Healthwise, [employer]. Care instructions adapted under license by [facility] ([facility]). If you have questions about a medical condition or this instruction, always ask your healthcare professional. [address] any warranty or liability for your use of this information.

## 2018-03-26 ENCOUNTER — TELEPHONE (OUTPATIENT)
Dept: FAMILY MEDICINE CLINIC | Age: 53
End: 2018-03-26

## 2018-03-27 ENCOUNTER — TELEPHONE (OUTPATIENT)
Dept: FAMILY MEDICINE CLINIC | Age: 53
End: 2018-03-27

## 2018-03-29 DIAGNOSIS — R39.198 DECREASED URINE STREAM: Primary | ICD-10-CM

## 2018-03-29 LAB
PROSTATE SPECIFIC ANTIGEN FREE: 0.4 UG/L
PROSTATE SPECIFIC ANTIGEN PERCENT FREE: 57.1 %
PROSTATE SPECIFIC ANTIGEN: 0.7 UG/L (ref 0–4)

## 2018-03-29 RX ORDER — TAMSULOSIN HYDROCHLORIDE 0.4 MG/1
0.4 CAPSULE ORAL DAILY
Qty: 30 CAPSULE | Refills: 5 | Status: SHIPPED | OUTPATIENT
Start: 2018-03-29 | End: 2018-05-09 | Stop reason: SDUPTHER

## 2018-04-24 ENCOUNTER — OFFICE VISIT (OUTPATIENT)
Dept: FAMILY MEDICINE CLINIC | Age: 53
End: 2018-04-24

## 2018-04-24 VITALS
WEIGHT: 216 LBS | BODY MASS INDEX: 30.92 KG/M2 | TEMPERATURE: 98.9 F | HEART RATE: 80 BPM | OXYGEN SATURATION: 96 % | DIASTOLIC BLOOD PRESSURE: 80 MMHG | SYSTOLIC BLOOD PRESSURE: 120 MMHG | RESPIRATION RATE: 14 BRPM | HEIGHT: 70 IN

## 2018-04-24 DIAGNOSIS — I10 ESSENTIAL HYPERTENSION: ICD-10-CM

## 2018-04-24 DIAGNOSIS — E11.69 DIABETES MELLITUS TYPE 2 IN OBESE (HCC): Primary | ICD-10-CM

## 2018-04-24 DIAGNOSIS — E66.9 DIABETES MELLITUS TYPE 2 IN OBESE (HCC): Primary | ICD-10-CM

## 2018-04-24 DIAGNOSIS — I89.0 LYMPHEDEMA: ICD-10-CM

## 2018-04-24 DIAGNOSIS — Z98.84 S/P LAPAROSCOPIC SLEEVE GASTRECTOMY: ICD-10-CM

## 2018-04-24 DIAGNOSIS — F17.209 TOBACCO USE DISORDER, CONTINUOUS: ICD-10-CM

## 2018-04-24 DIAGNOSIS — G47.33 OSA (OBSTRUCTIVE SLEEP APNEA): ICD-10-CM

## 2018-04-24 LAB
CREATININE URINE POCT: NORMAL
HBA1C MFR BLD: 4.9 %
MICROALBUMIN/CREAT 24H UR: NORMAL MG/G{CREAT}
MICROALBUMIN/CREAT UR-RTO: NORMAL

## 2018-04-24 PROCEDURE — 83036 HEMOGLOBIN GLYCOSYLATED A1C: CPT | Performed by: FAMILY MEDICINE

## 2018-04-24 PROCEDURE — 82044 UR ALBUMIN SEMIQUANTITATIVE: CPT | Performed by: FAMILY MEDICINE

## 2018-04-24 PROCEDURE — 99214 OFFICE O/P EST MOD 30 MIN: CPT | Performed by: FAMILY MEDICINE

## 2018-05-09 DIAGNOSIS — R39.198 DECREASED URINE STREAM: ICD-10-CM

## 2018-05-09 RX ORDER — TAMSULOSIN HYDROCHLORIDE 0.4 MG/1
0.4 CAPSULE ORAL DAILY
Qty: 90 CAPSULE | Refills: 3 | Status: SHIPPED | OUTPATIENT
Start: 2018-05-09 | End: 2019-08-14 | Stop reason: SDUPTHER

## 2018-06-04 ENCOUNTER — OFFICE VISIT (OUTPATIENT)
Dept: BARIATRICS/WEIGHT MGMT | Age: 53
End: 2018-06-04

## 2018-06-04 VITALS
WEIGHT: 219 LBS | HEART RATE: 76 BPM | DIASTOLIC BLOOD PRESSURE: 73 MMHG | HEIGHT: 70 IN | BODY MASS INDEX: 31.35 KG/M2 | SYSTOLIC BLOOD PRESSURE: 130 MMHG

## 2018-06-04 DIAGNOSIS — E11.69 DIABETES MELLITUS TYPE 2 IN OBESE (HCC): ICD-10-CM

## 2018-06-04 DIAGNOSIS — E66.9 OBESITY (BMI 30-39.9): ICD-10-CM

## 2018-06-04 DIAGNOSIS — I10 ESSENTIAL HYPERTENSION: ICD-10-CM

## 2018-06-04 DIAGNOSIS — E66.9 DIABETES MELLITUS TYPE 2 IN OBESE (HCC): ICD-10-CM

## 2018-06-04 DIAGNOSIS — Z98.84 S/P LAPAROSCOPIC SLEEVE GASTRECTOMY: Primary | ICD-10-CM

## 2018-06-04 PROCEDURE — 99213 OFFICE O/P EST LOW 20 MIN: CPT | Performed by: NURSE PRACTITIONER

## 2018-06-04 ASSESSMENT — ENCOUNTER SYMPTOMS
GASTROINTESTINAL NEGATIVE: 1
EYES NEGATIVE: 1
RESPIRATORY NEGATIVE: 1
ALLERGIC/IMMUNOLOGIC NEGATIVE: 1

## 2018-09-25 ENCOUNTER — OFFICE VISIT (OUTPATIENT)
Dept: FAMILY MEDICINE CLINIC | Age: 53
End: 2018-09-25
Payer: COMMERCIAL

## 2018-09-25 VITALS
WEIGHT: 233.6 LBS | OXYGEN SATURATION: 98 % | HEART RATE: 78 BPM | HEIGHT: 70 IN | DIASTOLIC BLOOD PRESSURE: 75 MMHG | SYSTOLIC BLOOD PRESSURE: 128 MMHG | BODY MASS INDEX: 33.44 KG/M2

## 2018-09-25 DIAGNOSIS — E11.69 DIABETES MELLITUS TYPE 2 IN OBESE (HCC): ICD-10-CM

## 2018-09-25 DIAGNOSIS — I10 ESSENTIAL HYPERTENSION: ICD-10-CM

## 2018-09-25 DIAGNOSIS — Z98.84 S/P LAPAROSCOPIC SLEEVE GASTRECTOMY: ICD-10-CM

## 2018-09-25 DIAGNOSIS — Z23 FLU VACCINE NEED: ICD-10-CM

## 2018-09-25 DIAGNOSIS — E66.9 DIABETES MELLITUS TYPE 2 IN OBESE (HCC): ICD-10-CM

## 2018-09-25 DIAGNOSIS — Z23 NEED FOR VACCINATION FOR PNEUMOCOCCUS: ICD-10-CM

## 2018-09-25 DIAGNOSIS — L97.919 VENOUS STASIS ULCERS OF BOTH LOWER EXTREMITIES (HCC): Primary | ICD-10-CM

## 2018-09-25 DIAGNOSIS — G47.33 OSA (OBSTRUCTIVE SLEEP APNEA): ICD-10-CM

## 2018-09-25 DIAGNOSIS — I83.019 VENOUS STASIS ULCERS OF BOTH LOWER EXTREMITIES (HCC): Primary | ICD-10-CM

## 2018-09-25 DIAGNOSIS — I83.029 VENOUS STASIS ULCERS OF BOTH LOWER EXTREMITIES (HCC): Primary | ICD-10-CM

## 2018-09-25 DIAGNOSIS — I87.2 VENOUS STASIS DERMATITIS OF BOTH LOWER EXTREMITIES: ICD-10-CM

## 2018-09-25 DIAGNOSIS — L97.929 VENOUS STASIS ULCERS OF BOTH LOWER EXTREMITIES (HCC): Primary | ICD-10-CM

## 2018-09-25 LAB
A/G RATIO: 1.6 (ref 1.1–2.2)
ALBUMIN SERPL-MCNC: 4.2 G/DL (ref 3.4–5)
ALP BLD-CCNC: 129 U/L (ref 40–129)
ALT SERPL-CCNC: 14 U/L (ref 10–40)
ANION GAP SERPL CALCULATED.3IONS-SCNC: 14 MMOL/L (ref 3–16)
AST SERPL-CCNC: 18 U/L (ref 15–37)
BASOPHILS ABSOLUTE: 0 K/UL (ref 0–0.2)
BASOPHILS RELATIVE PERCENT: 0.5 %
BILIRUB SERPL-MCNC: 0.8 MG/DL (ref 0–1)
BUN BLDV-MCNC: 18 MG/DL (ref 7–20)
CALCIUM SERPL-MCNC: 9 MG/DL (ref 8.3–10.6)
CHLORIDE BLD-SCNC: 102 MMOL/L (ref 99–110)
CO2: 24 MMOL/L (ref 21–32)
CREAT SERPL-MCNC: 0.9 MG/DL (ref 0.9–1.3)
EOSINOPHILS ABSOLUTE: 0.2 K/UL (ref 0–0.6)
EOSINOPHILS RELATIVE PERCENT: 2.3 %
GFR AFRICAN AMERICAN: >60
GFR NON-AFRICAN AMERICAN: >60
GLOBULIN: 2.7 G/DL
GLUCOSE BLD-MCNC: 134 MG/DL (ref 70–99)
HCT VFR BLD CALC: 40.7 % (ref 40.5–52.5)
HEMOGLOBIN: 13.9 G/DL (ref 13.5–17.5)
LYMPHOCYTES ABSOLUTE: 1.6 K/UL (ref 1–5.1)
LYMPHOCYTES RELATIVE PERCENT: 24 %
MCH RBC QN AUTO: 33.6 PG (ref 26–34)
MCHC RBC AUTO-ENTMCNC: 34.1 G/DL (ref 31–36)
MCV RBC AUTO: 98.5 FL (ref 80–100)
MONOCYTES ABSOLUTE: 0.4 K/UL (ref 0–1.3)
MONOCYTES RELATIVE PERCENT: 5.9 %
NEUTROPHILS ABSOLUTE: 4.5 K/UL (ref 1.7–7.7)
NEUTROPHILS RELATIVE PERCENT: 67.3 %
PDW BLD-RTO: 12.2 % (ref 12.4–15.4)
PLATELET # BLD: 242 K/UL (ref 135–450)
PMV BLD AUTO: 7.5 FL (ref 5–10.5)
POTASSIUM SERPL-SCNC: 4.6 MMOL/L (ref 3.5–5.1)
RBC # BLD: 4.13 M/UL (ref 4.2–5.9)
SODIUM BLD-SCNC: 140 MMOL/L (ref 136–145)
TOTAL PROTEIN: 6.9 G/DL (ref 6.4–8.2)
TSH SERPL DL<=0.05 MIU/L-ACNC: 1.51 UIU/ML (ref 0.27–4.2)
WBC # BLD: 6.7 K/UL (ref 4–11)

## 2018-09-25 PROCEDURE — 90732 PPSV23 VACC 2 YRS+ SUBQ/IM: CPT | Performed by: FAMILY MEDICINE

## 2018-09-25 PROCEDURE — 90682 RIV4 VACC RECOMBINANT DNA IM: CPT | Performed by: FAMILY MEDICINE

## 2018-09-25 PROCEDURE — 90472 IMMUNIZATION ADMIN EACH ADD: CPT | Performed by: FAMILY MEDICINE

## 2018-09-25 PROCEDURE — 99214 OFFICE O/P EST MOD 30 MIN: CPT | Performed by: FAMILY MEDICINE

## 2018-09-25 PROCEDURE — 90471 IMMUNIZATION ADMIN: CPT | Performed by: FAMILY MEDICINE

## 2018-09-25 ASSESSMENT — PATIENT HEALTH QUESTIONNAIRE - PHQ9
1. LITTLE INTEREST OR PLEASURE IN DOING THINGS: 0
SUM OF ALL RESPONSES TO PHQ QUESTIONS 1-9: 0
SUM OF ALL RESPONSES TO PHQ QUESTIONS 1-9: 0
SUM OF ALL RESPONSES TO PHQ9 QUESTIONS 1 & 2: 0
2. FEELING DOWN, DEPRESSED OR HOPELESS: 0

## 2018-09-25 NOTE — LETTER
[address] [address]  Phone: [phone]  Fax: [phone]    Jose Carlos Norman MD        September 25, 2018     Patient: Syeda Rizzo   YOB: 1965   Date of Visit: 9/25/2018       To Whom it May Concern:    Syeda Rizzo was seen in my clinic on 9/25/2018. He may return to work on 09/27/18. Pt missed work 9/24, 9/25, & 9/26 due to [facility] condition. Pt during these dates was unable to perform regular duties. .    If you have any questions or concerns, please don't hesitate to call.     Sincerely,         Jose Carlos Norman MD

## 2018-09-25 NOTE — PROGRESS NOTES
Sarahi Lindsey is a 46 y.o. male. HPI: Patient here for check up and because of his leg swelling has recurred over the last week or 2  For slightly had some leftover furosemide he took 2 or 3 of those this week and his swelling is better  No shortness of breath or chest pain  He has successfully lost greater deal of weight after his gastric sleeve quit smoking but sugars came back to normal he is doing great  However the last 6 months she's been on 1520 pounds back which she treats to not exercising much and eating salty foods  He also needs his FMLA forms updated for time off work for leg swelling and discomfort  He is taking gabapentin 300 mg tablets 2 in the morning 2 at night to control his neuropathy but still has some numbness  Not using his CPAP every night because he often falls asleep in his recliner  Meds, vitamins and allergies reviewed with pt    ROS: No TIA's or unusual headaches, no dysphagia. No prolonged cough. No dyspnea or chest pain on exertion. No abdominal pain, change in bowel habits, black or bloody stools. No urinary tract symptoms. No new or unusual musculoskeletal symptoms. Prior to Visit Medications    Medication Sig Taking?  Authorizing Provider   tamsulosin (FLOMAX) 0.4 MG capsule Take 1 capsule by mouth daily Yes Jose Rafael Ivory MD   ONE TOUCH ULTRA TEST strip USE 1 STRIP  DAILY AS NEEDED Yes Jose Rafael Ivory MD   verapamil (CALAN SR) 120 MG extended release tablet TAKE 1 TABLET DAILY Yes Jose Rafael Ivory MD   gabapentin (NEURONTIN) 300 MG capsule TAKE 1 CAPSULE THREE TIMES A DAY Yes Jose Rafael Ivory MD   Multiple Vitamins-Minerals (BARIATRIC FUSION) CHEW Take 3 tablets by mouth daily Yes Historical Provider, MD   metoprolol succinate (TOPROL XL) 25 MG extended release tablet Take 1 tablet by mouth daily Yes Lori Miguel MD   aspirin 81 MG tablet Take 81 mg by mouth daily Yes Historical Provider, MD       Past Medical History:   Diagnosis Date    Arthritis with patient greater than 50% of time reviewing healthy lifestyle, exercise, diet and corning his care

## 2018-09-26 LAB
ESTIMATED AVERAGE GLUCOSE: 96.8 MG/DL
HBA1C MFR BLD: 5 %

## 2018-10-18 RX ORDER — GABAPENTIN 300 MG/1
CAPSULE ORAL
Qty: 90 CAPSULE | Refills: 3 | Status: SHIPPED | OUTPATIENT
Start: 2018-10-18 | End: 2019-03-14 | Stop reason: SDUPTHER

## 2018-10-18 RX ORDER — METOPROLOL SUCCINATE 25 MG/1
TABLET, EXTENDED RELEASE ORAL
Qty: 90 TABLET | Refills: 0 | Status: SHIPPED | OUTPATIENT
Start: 2018-10-18 | End: 2019-03-28 | Stop reason: SDUPTHER

## 2018-11-14 ENCOUNTER — OFFICE VISIT (OUTPATIENT)
Dept: FAMILY MEDICINE CLINIC | Age: 53
End: 2018-11-14
Payer: COMMERCIAL

## 2018-11-14 VITALS
HEART RATE: 67 BPM | WEIGHT: 239 LBS | HEIGHT: 70 IN | SYSTOLIC BLOOD PRESSURE: 130 MMHG | DIASTOLIC BLOOD PRESSURE: 82 MMHG | OXYGEN SATURATION: 95 % | BODY MASS INDEX: 34.22 KG/M2

## 2018-11-14 DIAGNOSIS — M62.830 SPASM OF MUSCLE OF LOWER BACK: Primary | ICD-10-CM

## 2018-11-14 DIAGNOSIS — E11.69 DIABETES MELLITUS TYPE 2 IN OBESE (HCC): ICD-10-CM

## 2018-11-14 DIAGNOSIS — E66.9 DIABETES MELLITUS TYPE 2 IN OBESE (HCC): ICD-10-CM

## 2018-11-14 DIAGNOSIS — I10 ESSENTIAL HYPERTENSION: ICD-10-CM

## 2018-11-14 PROCEDURE — 99213 OFFICE O/P EST LOW 20 MIN: CPT | Performed by: FAMILY MEDICINE

## 2018-11-14 RX ORDER — TIZANIDINE 4 MG/1
4 TABLET ORAL EVERY 8 HOURS PRN
Qty: 10 TABLET | Refills: 0 | Status: SHIPPED | OUTPATIENT
Start: 2018-11-14 | End: 2018-11-19 | Stop reason: SDUPTHER

## 2018-11-14 NOTE — PROGRESS NOTES
meds [facility]  -  DIABETES FOOT EXAM    2. Essential hypertension  Stable, continue medication    3.  Spasm of muscle of lower back  Heat and tizanidine  Rest, R written to be off work until able to resume duties

## 2018-11-19 DIAGNOSIS — M62.830 SPASM OF MUSCLE OF LOWER BACK: ICD-10-CM

## 2018-11-19 RX ORDER — TIZANIDINE 4 MG/1
TABLET ORAL
Qty: 30 TABLET | Refills: 5 | Status: SHIPPED | OUTPATIENT
Start: 2018-11-19 | End: 2020-02-28 | Stop reason: SDUPTHER

## 2019-03-15 RX ORDER — GABAPENTIN 300 MG/1
CAPSULE ORAL
Qty: 90 CAPSULE | Refills: 2 | Status: SHIPPED | OUTPATIENT
Start: 2019-03-15 | End: 2019-05-23 | Stop reason: SDUPTHER

## 2019-03-29 RX ORDER — METOPROLOL SUCCINATE 25 MG/1
TABLET, EXTENDED RELEASE ORAL
Qty: 90 TABLET | Refills: 0 | Status: SHIPPED | OUTPATIENT
Start: 2019-03-29 | End: 2019-05-28 | Stop reason: SDUPTHER

## 2019-05-23 ENCOUNTER — TELEPHONE (OUTPATIENT)
Dept: FAMILY MEDICINE CLINIC | Age: 54
End: 2019-05-23

## 2019-05-23 RX ORDER — GABAPENTIN 300 MG/1
CAPSULE ORAL
Qty: 90 CAPSULE | Refills: 2 | Status: SHIPPED | OUTPATIENT
Start: 2019-05-23 | End: 2019-09-12 | Stop reason: SDUPTHER

## 2019-05-28 RX ORDER — METOPROLOL SUCCINATE 25 MG/1
TABLET, EXTENDED RELEASE ORAL
Qty: 90 TABLET | Refills: 0 | Status: SHIPPED | OUTPATIENT
Start: 2019-05-28 | End: 2019-06-10 | Stop reason: SDUPTHER

## 2019-06-10 ENCOUNTER — OFFICE VISIT (OUTPATIENT)
Dept: FAMILY MEDICINE CLINIC | Age: 54
End: 2019-06-10
Payer: COMMERCIAL

## 2019-06-10 VITALS
SYSTOLIC BLOOD PRESSURE: 130 MMHG | OXYGEN SATURATION: 98 % | DIASTOLIC BLOOD PRESSURE: 74 MMHG | HEIGHT: 70 IN | HEART RATE: 75 BPM | WEIGHT: 254.4 LBS | BODY MASS INDEX: 36.42 KG/M2

## 2019-06-10 DIAGNOSIS — E66.9 OBESITY (BMI 30-39.9): ICD-10-CM

## 2019-06-10 DIAGNOSIS — I89.0 LYMPHEDEMA: ICD-10-CM

## 2019-06-10 DIAGNOSIS — G47.33 OSA (OBSTRUCTIVE SLEEP APNEA): ICD-10-CM

## 2019-06-10 DIAGNOSIS — I10 ESSENTIAL HYPERTENSION: ICD-10-CM

## 2019-06-10 DIAGNOSIS — E66.9 DIABETES MELLITUS TYPE 2 IN OBESE (HCC): Primary | ICD-10-CM

## 2019-06-10 DIAGNOSIS — E11.69 DIABETES MELLITUS TYPE 2 IN OBESE (HCC): Primary | ICD-10-CM

## 2019-06-10 LAB
CREATININE URINE POCT: 300
HBA1C MFR BLD: 5.5 %
MICROALBUMIN/CREAT 24H UR: 30 MG/G{CREAT}
MICROALBUMIN/CREAT UR-RTO: 30

## 2019-06-10 PROCEDURE — 82044 UR ALBUMIN SEMIQUANTITATIVE: CPT | Performed by: FAMILY MEDICINE

## 2019-06-10 PROCEDURE — 99214 OFFICE O/P EST MOD 30 MIN: CPT | Performed by: FAMILY MEDICINE

## 2019-06-10 PROCEDURE — 83036 HEMOGLOBIN GLYCOSYLATED A1C: CPT | Performed by: FAMILY MEDICINE

## 2019-06-10 RX ORDER — METOPROLOL SUCCINATE 25 MG/1
TABLET, EXTENDED RELEASE ORAL
Qty: 90 TABLET | Refills: 3 | Status: SHIPPED | OUTPATIENT
Start: 2019-06-10 | End: 2020-07-20 | Stop reason: SDUPTHER

## 2019-06-10 ASSESSMENT — PATIENT HEALTH QUESTIONNAIRE - PHQ9
1. LITTLE INTEREST OR PLEASURE IN DOING THINGS: 0
SUM OF ALL RESPONSES TO PHQ QUESTIONS 1-9: 0
SUM OF ALL RESPONSES TO PHQ9 QUESTIONS 1 & 2: 0
SUM OF ALL RESPONSES TO PHQ QUESTIONS 1-9: 0
2. FEELING DOWN, DEPRESSED OR HOPELESS: 0

## 2019-06-10 NOTE — PROGRESS NOTES
Dave Mullins is a 46 y.o. male. HPI: Here for complex medical visit  Had bariatric surgery roughly 3 years ago when his weight was 26 got all the way down to 211, now back up to 250+  Still working full-time as a mail   Having some pain off and on his right knee  The stasis dermatitis and ulcerations that he had before are still gone  He is off all his diabetes medicine  Does still take gabapentin for neuropathy however  Has not been exercising much recently  Meds, vitamins and allergies reviewed with pt    ROS: No TIA's or unusual headaches, no dysphagia. No prolonged cough. No dyspnea or chest pain on exertion. No abdominal pain, change in bowel habits, black or bloody stools. No urinary tract symptoms. No new or unusual musculoskeletal symptoms. Prior to Visit Medications    Medication Sig Taking?  Authorizing Provider   metoprolol succinate (TOPROL XL) 25 MG extended release tablet TAKE 1 TABLET DAILY Yes Mari López MD   gabapentin (NEURONTIN) 300 MG capsule TAKE 1 CAPSULE THREE TIMES A DAY Yes Mary Moser MD   ONE TOUCH ULTRA TEST strip USE 1 STRIP  DAILY AS NEEDED Yes Mari López MD   verapamil (CALAN SR) 120 MG extended release tablet TAKE 1 TABLET DAILY Yes Mari López MD   tiZANidine (ZANAFLEX) 4 MG tablet TAKE 1 TABLET BY MOUTH EVERY 8 HOURS AS NEEDED FOR MUSCLE SPASM Yes Mari López MD   tamsulosin (FLOMAX) 0.4 MG capsule Take 1 capsule by mouth daily Yes Mari López MD   Multiple Vitamins-Minerals (BARIATRIC FUSION) CHEW Take 3 tablets by mouth daily Yes Historical Provider, MD   metoprolol succinate (TOPROL XL) 25 MG extended release tablet Take 1 tablet by mouth daily Yes Tj Henry MD   aspirin 81 MG tablet Take 81 mg by mouth daily Yes Historical Provider, MD       Past Medical History:   Diagnosis Date    Arthritis     Diabetes mellitus type 2 in obese ([facility] [address]     DM type 2 (diabetes mellitus, type 2) ([facility] [address]     HTN  Lymphedema     Morbid obesity with BMI of 50.0-59.9, adult (HCC)     Neuropathy     feet    Obesity     HANNAH (obstructive sleep apnea) 04/19/2016    bipap    Stasis dermatitis of both legs     Tobacco abuse, continuous        Social History     Tobacco Use    Smoking status: Former Smoker     Packs/day: 1.00     Years: 43.00     Pack years: 43.00     Types: Cigarettes     Last attempt to quit: 4/9/2016     Years since quitting: 3.1    Smokeless tobacco: Never Used    Tobacco comment: started smoking at age 14 - smoked up to 1 p.p.d    Substance Use Topics    Alcohol use: No     Alcohol/week: 0.0 oz    Drug use: No       Family History   Problem Relation Age of Onset    Diabetes Mother     Cancer Mother     Arthritis Mother     Glaucoma Mother     Migraines Mother     Substance Abuse Father     Arthritis Father     Heart Disease Father     High Blood Pressure Father     Migraines Sister        Allergies   Allergen Reactions    Lisinopril Hives    Amoxicillin-Pot Clavulanate Diarrhea       OBJECTIVE:  /74   Pulse 75   Ht 5' 10\" (1.778 m)   Wt 254 lb 6.4 oz (115.4 kg)   SpO2 98%   BMI 36.50 kg/m²   GEN:  in NAD obese weight is up 40 pounds  NECK:  Supple without adenopathy. No bruits  CV:  Regular rate and rhythm, S1 and S2 normal, no murmurs, clicks  PULM:  Chest is clear, no wheezing ,  symmetric air entry throughout both lung fields. EXT: No rash or edema, stasis discoloration but no redness warmth or swelling  NEURO: nonfocal  A1c-5.5  ASSESSMENT/PLAN:  1. Diabetes mellitus type 2 in obese ([address]  Resolved with diet and exercise even though weight is gone up recently  - POCT glycosylated hemoglobin (Hb A1C)  - POCT microalbumin    2. Essential hypertension  Stable continue both medications    3. Lymphedema  Resolved    4. Knee DJD  Mild, follow-up with orthopedic doctor for another cortisone injection    5. Obesity (BMI 30-39. 9)  Challenged him to resume exercise and get 20 pounds off before we see him in the fall for repeat blood work    Spent 25 minutes with patient greater than 50% time discussing healthy lifestyle, diet exercise and weight loss efforts as well as coordinating his care

## 2019-08-14 DIAGNOSIS — R39.198 DECREASED URINE STREAM: ICD-10-CM

## 2019-08-15 RX ORDER — TAMSULOSIN HYDROCHLORIDE 0.4 MG/1
CAPSULE ORAL
Qty: 90 CAPSULE | Refills: 2 | Status: SHIPPED | OUTPATIENT
Start: 2019-08-15 | End: 2020-06-05

## 2019-09-13 RX ORDER — GABAPENTIN 300 MG/1
CAPSULE ORAL
Qty: 270 CAPSULE | Refills: 2 | Status: SHIPPED | OUTPATIENT
Start: 2019-09-13 | End: 2020-04-22 | Stop reason: SDUPTHER

## 2019-09-16 ENCOUNTER — OFFICE VISIT (OUTPATIENT)
Dept: FAMILY MEDICINE CLINIC | Age: 54
End: 2019-09-16
Payer: COMMERCIAL

## 2019-09-16 ENCOUNTER — TELEPHONE (OUTPATIENT)
Dept: FAMILY MEDICINE CLINIC | Age: 54
End: 2019-09-16

## 2019-09-16 VITALS
BODY MASS INDEX: 37.42 KG/M2 | HEART RATE: 64 BPM | HEIGHT: 70 IN | WEIGHT: 261.38 LBS | SYSTOLIC BLOOD PRESSURE: 150 MMHG | TEMPERATURE: 97.4 F | OXYGEN SATURATION: 97 % | DIASTOLIC BLOOD PRESSURE: 92 MMHG

## 2019-09-16 DIAGNOSIS — E11.69 DIABETES MELLITUS TYPE 2 IN OBESE (HCC): Primary | ICD-10-CM

## 2019-09-16 DIAGNOSIS — E66.9 DIABETES MELLITUS TYPE 2 IN OBESE (HCC): Primary | ICD-10-CM

## 2019-09-16 LAB — HBA1C MFR BLD: 5.5 %

## 2019-09-16 PROCEDURE — 83036 HEMOGLOBIN GLYCOSYLATED A1C: CPT | Performed by: FAMILY MEDICINE

## 2019-09-16 PROCEDURE — 99213 OFFICE O/P EST LOW 20 MIN: CPT | Performed by: FAMILY MEDICINE

## 2019-11-05 ENCOUNTER — TELEPHONE (OUTPATIENT)
Dept: FAMILY MEDICINE CLINIC | Age: 54
End: 2019-11-05

## 2019-11-05 ENCOUNTER — OFFICE VISIT (OUTPATIENT)
Dept: FAMILY MEDICINE CLINIC | Age: 54
End: 2019-11-05
Payer: COMMERCIAL

## 2019-11-05 VITALS
DIASTOLIC BLOOD PRESSURE: 90 MMHG | HEART RATE: 74 BPM | SYSTOLIC BLOOD PRESSURE: 160 MMHG | HEIGHT: 70 IN | WEIGHT: 261 LBS | BODY MASS INDEX: 37.37 KG/M2 | OXYGEN SATURATION: 97 %

## 2019-11-05 DIAGNOSIS — H60.12 CELLULITIS OF EAR, LEFT: Primary | ICD-10-CM

## 2019-11-05 DIAGNOSIS — H60.12 CELLULITIS OF ANTIHELIX OF LEFT EAR: ICD-10-CM

## 2019-11-05 PROCEDURE — 99214 OFFICE O/P EST MOD 30 MIN: CPT | Performed by: FAMILY MEDICINE

## 2019-11-05 RX ORDER — CEPHALEXIN 500 MG/1
500 CAPSULE ORAL 3 TIMES DAILY
Qty: 30 CAPSULE | Refills: 0 | Status: SHIPPED | OUTPATIENT
Start: 2019-11-05 | End: 2020-02-28

## 2020-02-28 ENCOUNTER — OFFICE VISIT (OUTPATIENT)
Dept: FAMILY MEDICINE CLINIC | Age: 55
End: 2020-02-28
Payer: COMMERCIAL

## 2020-02-28 VITALS
OXYGEN SATURATION: 99 % | BODY MASS INDEX: 39.6 KG/M2 | HEART RATE: 63 BPM | DIASTOLIC BLOOD PRESSURE: 95 MMHG | WEIGHT: 276 LBS | SYSTOLIC BLOOD PRESSURE: 175 MMHG

## 2020-02-28 PROCEDURE — 99213 OFFICE O/P EST LOW 20 MIN: CPT | Performed by: FAMILY MEDICINE

## 2020-02-28 RX ORDER — IBUPROFEN 800 MG/1
800 TABLET ORAL
Qty: 90 TABLET | Refills: 0 | OUTPATIENT
Start: 2020-02-28 | End: 2021-02-02

## 2020-02-28 RX ORDER — HYDROCHLOROTHIAZIDE 25 MG/1
25 TABLET ORAL EVERY MORNING
Qty: 30 TABLET | Refills: 0 | Status: SHIPPED | OUTPATIENT
Start: 2020-02-28 | End: 2020-07-20 | Stop reason: SDUPTHER

## 2020-02-28 RX ORDER — TIZANIDINE 4 MG/1
TABLET ORAL
Qty: 30 TABLET | Refills: 5 | Status: SHIPPED | OUTPATIENT
Start: 2020-02-28 | End: 2021-02-02
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
## 2020-03-06 ENCOUNTER — TELEPHONE (OUTPATIENT)
Dept: FAMILY MEDICINE CLINIC | Age: 55
End: 2020-03-06

## 2020-03-19 ENCOUNTER — OFFICE VISIT (OUTPATIENT)
Dept: FAMILY MEDICINE CLINIC | Age: 55
End: 2020-03-19
Payer: COMMERCIAL

## 2020-03-19 VITALS
BODY MASS INDEX: 39.37 KG/M2 | WEIGHT: 275 LBS | TEMPERATURE: 97.8 F | DIASTOLIC BLOOD PRESSURE: 88 MMHG | HEART RATE: 68 BPM | OXYGEN SATURATION: 97 % | SYSTOLIC BLOOD PRESSURE: 138 MMHG | HEIGHT: 70 IN

## 2020-03-19 DIAGNOSIS — E66.9 DIABETES MELLITUS TYPE 2 IN OBESE (HCC): ICD-10-CM

## 2020-03-19 DIAGNOSIS — I10 ESSENTIAL HYPERTENSION: ICD-10-CM

## 2020-03-19 DIAGNOSIS — E11.69 DIABETES MELLITUS TYPE 2 IN OBESE (HCC): ICD-10-CM

## 2020-03-19 DIAGNOSIS — E66.9 OBESITY (BMI 30-39.9): ICD-10-CM

## 2020-03-19 DIAGNOSIS — Z12.5 PROSTATE CANCER SCREENING: ICD-10-CM

## 2020-03-19 LAB
A/G RATIO: 1.5 (ref 1.1–2.2)
ALBUMIN SERPL-MCNC: 4.1 G/DL (ref 3.4–5)
ALP BLD-CCNC: 120 U/L (ref 40–129)
ALT SERPL-CCNC: 13 U/L (ref 10–40)
ANION GAP SERPL CALCULATED.3IONS-SCNC: 16 MMOL/L (ref 3–16)
AST SERPL-CCNC: 19 U/L (ref 15–37)
BASOPHILS ABSOLUTE: 0 K/UL (ref 0–0.2)
BASOPHILS RELATIVE PERCENT: 0.5 %
BILIRUB SERPL-MCNC: 1 MG/DL (ref 0–1)
BUN BLDV-MCNC: 16 MG/DL (ref 7–20)
CALCIUM SERPL-MCNC: 9.4 MG/DL (ref 8.3–10.6)
CHLORIDE BLD-SCNC: 102 MMOL/L (ref 99–110)
CHOLESTEROL, TOTAL: 137 MG/DL (ref 0–199)
CO2: 24 MMOL/L (ref 21–32)
CREAT SERPL-MCNC: 0.9 MG/DL (ref 0.9–1.3)
EOSINOPHILS ABSOLUTE: 0.1 K/UL (ref 0–0.6)
EOSINOPHILS RELATIVE PERCENT: 1.4 %
FOLATE: 10.77 NG/ML (ref 4.78–24.2)
GFR AFRICAN AMERICAN: >60
GFR NON-AFRICAN AMERICAN: >60
GLOBULIN: 2.8 G/DL
GLUCOSE BLD-MCNC: 108 MG/DL (ref 70–99)
HBA1C MFR BLD: 6.1 %
HCT VFR BLD CALC: 42.1 % (ref 40.5–52.5)
HDLC SERPL-MCNC: 45 MG/DL (ref 40–60)
HEMOGLOBIN: 14 G/DL (ref 13.5–17.5)
LDL CHOLESTEROL CALCULATED: 73 MG/DL
LYMPHOCYTES ABSOLUTE: 1.9 K/UL (ref 1–5.1)
LYMPHOCYTES RELATIVE PERCENT: 30.4 %
MCH RBC QN AUTO: 32.3 PG (ref 26–34)
MCHC RBC AUTO-ENTMCNC: 33.2 G/DL (ref 31–36)
MCV RBC AUTO: 97.1 FL (ref 80–100)
MONOCYTES ABSOLUTE: 0.4 K/UL (ref 0–1.3)
MONOCYTES RELATIVE PERCENT: 6.2 %
NEUTROPHILS ABSOLUTE: 3.9 K/UL (ref 1.7–7.7)
NEUTROPHILS RELATIVE PERCENT: 61.5 %
PDW BLD-RTO: 12.7 % (ref 12.4–15.4)
PLATELET # BLD: 226 K/UL (ref 135–450)
PMV BLD AUTO: 8.2 FL (ref 5–10.5)
POTASSIUM SERPL-SCNC: 4.6 MMOL/L (ref 3.5–5.1)
PROSTATE SPECIFIC ANTIGEN: 1.38 NG/ML (ref 0–4)
RBC # BLD: 4.34 M/UL (ref 4.2–5.9)
SODIUM BLD-SCNC: 142 MMOL/L (ref 136–145)
TOTAL PROTEIN: 6.9 G/DL (ref 6.4–8.2)
TRIGL SERPL-MCNC: 95 MG/DL (ref 0–150)
VITAMIN B-12: 329 PG/ML (ref 211–911)
VLDLC SERPL CALC-MCNC: 19 MG/DL
WBC # BLD: 6.4 K/UL (ref 4–11)

## 2020-03-19 PROCEDURE — 83036 HEMOGLOBIN GLYCOSYLATED A1C: CPT | Performed by: FAMILY MEDICINE

## 2020-03-19 PROCEDURE — 99214 OFFICE O/P EST MOD 30 MIN: CPT | Performed by: FAMILY MEDICINE

## 2020-03-19 ASSESSMENT — PATIENT HEALTH QUESTIONNAIRE - PHQ9
2. FEELING DOWN, DEPRESSED OR HOPELESS: 0
SUM OF ALL RESPONSES TO PHQ9 QUESTIONS 1 & 2: 0
SUM OF ALL RESPONSES TO PHQ QUESTIONS 1-9: 0
1. LITTLE INTEREST OR PLEASURE IN DOING THINGS: 0
SUM OF ALL RESPONSES TO PHQ QUESTIONS 1-9: 0

## 2020-03-19 NOTE — PROGRESS NOTES
release tablet Take 1 tablet by mouth daily  Adelaida Camara MD   ibuprofen (IBU) 800 MG tablet Take 1 tablet by mouth every 6 hours as needed for Pain. Pam Mcgee MD       Past Medical History:   Diagnosis Date    Arthritis     [facility] mellitus type 2 [facility] ([facility]     DM type 2 (diabetes mellitus, type 2) ([facility]     HTN     Lymphedema     Morbid obesity with BMI of 50.0-59.9, adult ([facility]     Neuropathy     feet    Obesity     HANNAH (obstructive sleep apnea) 04/19/2016    bipap    Stasis dermatitis of both legs     Tobacco abuse, continuous        Social History     Tobacco Use    Smoking status: Former Smoker     Packs/day: 1.00     Years: 43.00     Pack years: 43.00     Types: Cigarettes     Last attempt to quit: 4/9/2016     Years since quitting: 3.9    Smokeless tobacco: Never Used    Tobacco comment: started smoking at age 14 - smoked up to 1 p.p.d    Substance Use Topics    Alcohol use: No     Alcohol/week: 0.0 standard drinks    Drug use: No       Family History   Problem Relation Age of Onset    Diabetes Mother     Cancer Mother     Arthritis Mother     Glaucoma Mother     Migraines Mother     Substance Abuse Father     Arthritis Father     Heart Disease Father     High Blood Pressure Father     Migraines Sister        Allergies   Allergen Reactions    Lisinopril Hives    Amoxicillin-Pot Clavulanate Diarrhea       OBJECTIVE:  /88   Pulse 68   Temp 97.8 °F (36.6 °C) (Tympanic)   Ht 5' 9.92\" (1.776 m)   Wt 275 lb (124.7 kg)   SpO2 97%   BMI 39.55 kg/m²   GEN:  in NAD obese weight stable recently  NECK:  Supple without adenopathy. No bruits  CV:  Regular rate and rhythm, S1 and S2 normal, no murmurs, clicks  PULM:  Chest is clear, no wheezing ,  symmetric air entry throughout both lung fields. EXT: No rash 1+ pitting edema, still with hyperpigmentation  NEURO: nonfocal  a1c -21  ASSESSMENT/PLAN:  1.  Diabetes mellitus type 2 in obese [facility])  Due for diabetic retinal exam  Continue present medication  2. Essential hypertension  stable  Continue present medication  3. Lymphedema  Lose weight    4. HANNAH (obstructive sleep apnea)  Not using CPAP  Resume CPAP and lose weight    5. Obesity (BMI 30-39. 9)  Exercise 150 minutes of brisk walking per week or the equivalent as a minimum  Decrease calorie diet  Labs today    Spent 25 minutes with patient greater than 50% time reviewing his flow sheet of blood pressure and BMI, discussing his A1c, reviewing the need for labs, and coordinating his care

## 2020-04-22 ENCOUNTER — TELEPHONE (OUTPATIENT)
Dept: FAMILY MEDICINE CLINIC | Age: 55
End: 2020-04-22

## 2020-04-22 RX ORDER — GABAPENTIN 300 MG/1
CAPSULE ORAL
Qty: 270 CAPSULE | Refills: 3 | Status: SHIPPED | OUTPATIENT
Start: 2020-04-22 | End: 2021-01-13 | Stop reason: SDUPTHER

## 2020-04-22 NOTE — TELEPHONE ENCOUNTER
Medication and Quantity requested:  gabapentin (NEURONTIN) 300 MG capsule - qty 270        Last Visit  03/19/20 - Dr Kalli Alfaro and phone number updated in EPIC:  Yes    Pharmacy:  [facility]

## 2020-04-22 NOTE — TELEPHONE ENCOUNTER
Medication:   Requested Prescriptions     Pending Prescriptions Disp Refills    verapamil (CALAN SR) 120 MG extended release tablet [Pharmacy Med Name: VERAPAMIL HCL ER TABS 120MG] 90 tablet 3     Sig: TAKE 1 TABLET DAILY       Last Filled:  03/15/2019 #90 2rf     Patient Phone Number: [phone] (home) [phone] (work)    Last appt: 3/19/2020   Next appt: 7/20/2020    Lab Results   Component Value Date     03/19/2020    K 4.6 03/19/2020     03/19/2020    CO2 24 03/19/2020    BUN 16 03/19/2020    CREATININE 0.9 03/19/2020    GLUCOSE 108 (H) 03/19/2020    CALCIUM 9.4 03/19/2020    PROT 6.9 03/19/2020    LABALBU 4.1 03/19/2020    BILITOT 1.0 03/19/2020    ALKPHOS 120 03/19/2020    AST 19 03/19/2020    ALT 13 03/19/2020    LABGLOM >60 03/19/2020    GFRAA >60 03/19/2020    AGRATIO 1.5 03/19/2020    GLOB 2.8 03/19/2020

## 2020-05-12 ENCOUNTER — TELEPHONE (OUTPATIENT)
Dept: FAMILY MEDICINE CLINIC | Age: 55
End: 2020-05-12

## 2020-05-12 ENCOUNTER — VIRTUAL VISIT (OUTPATIENT)
Dept: FAMILY MEDICINE CLINIC | Age: 55
End: 2020-05-12
Payer: COMMERCIAL

## 2020-05-12 PROCEDURE — 99214 OFFICE O/P EST MOD 30 MIN: CPT | Performed by: FAMILY MEDICINE

## 2020-05-12 NOTE — PROGRESS NOTES
Tobacco comment: started smoking at age 14 - smoked up to 1 p.p.d    Substance Use Topics    Alcohol use: No     Alcohol/week: 0.0 standard drinks    Drug use: No       Family History   Problem Relation Age of Onset    Diabetes Mother     Cancer Mother     Arthritis Mother     Glaucoma Mother     Migraines Mother     Substance Abuse Father     Arthritis Father     Heart Disease Father     High Blood Pressure Father     Migraines Sister        Allergies   Allergen Reactions    Lisinopril Hives    Amoxicillin-Pot Clavulanate Diarrhea       OBJECTIVE:  There were no vitals taken for this visit. GEN:  in NAD  Morbidly obese  EXT: Left lower leg purplish discoloration 2+ pitting edema    Lab Results   Component Value Date    LABA1C 6.1 03/19/2020     Lab Results   Component Value Date    EAG 96.8 09/25/2018     Lab Results   Component Value Date     03/19/2020    K 4.6 03/19/2020     03/19/2020    CO2 24 03/19/2020    BUN 16 03/19/2020    CREATININE 0.9 03/19/2020    GLUCOSE 108 (H) 03/19/2020    CALCIUM 9.4 03/19/2020    PROT 6.9 03/19/2020    LABALBU 4.1 03/19/2020    BILITOT 1.0 03/19/2020    ALKPHOS 120 03/19/2020    AST 19 03/19/2020    ALT 13 03/19/2020    LABGLOM >60 03/19/2020    GFRAA >60 03/19/2020    AGRATIO 1.5 03/19/2020    GLOB 2.8 03/19/2020       ASSESSMENT/PLAN:  1. Diabetes mellitus type 2 in obese [facility])  Has been diet controlled but I will ask him to check his sugars and use metformin accordingly    2. Essential hypertension  Stable, when last checked    3. Lymphedema  Worsening due to obesity  Resume compression stockings  Rest for 1 week  Elevate feet  Resume hydrochlorothiazide 1 to 2 tablets daily    4. HANNAH (obstructive sleep apnea)  CPAP compliant  Further weight loss    5.  Morbid obesity with BMI of 40.0-44.9, adult ([address]  Unfortunately he is gaining a lot of his weight back  Will start a low-carb diet such as Atkins or keto    6 foot pain again related to obesity possibly just due to lymphedema but cannot rule out diabetic neuropathy, letter fasciitis, stress fracture, etc.    Spent 25 minutes with patient, mostly going over medications and symptoms but also preparing a note for work as follows:      Re: Lexi Haddad  YOB: 1965    Dear Mi Paris,    Due to his recent flareup of lymphedema, peripheral edema and leg pain I feel it is warranted for Becky Camara to be off work May 11 through May 18th. He may return to work May 18.     Thank you for your cooperation    Sincerely,    Eddy Santacruz MD      Please fax this straight to can at [phone]

## 2020-05-12 NOTE — TELEPHONE ENCOUNTER
[facility] - VV FU, verify demographics/insurance, schedule 3 mo FU, collect copay, work excuse letter faxed to [phone]

## 2020-06-05 RX ORDER — TAMSULOSIN HYDROCHLORIDE 0.4 MG/1
CAPSULE ORAL
Qty: 90 CAPSULE | Refills: 3 | Status: SHIPPED | OUTPATIENT
Start: 2020-06-05 | End: 2021-06-29

## 2020-06-05 NOTE — TELEPHONE ENCOUNTER
Medication:   Requested Prescriptions     Pending Prescriptions Disp Refills    tamsulosin (FLOMAX) 0.4 MG capsule [Pharmacy Med Name: TAMSULOSIN HCL CAPS 0.4MG] 90 capsule 3     Sig: TAKE 1 CAPSULE DAILY        Last Filled: 8/15/19 #90, 2 RF      Patient Phone Number: [phone] (home) [phone] (work)    Last appt: 5/12/20 foot swelling, DM   Next appt: Visit date not found

## 2020-07-20 ENCOUNTER — OFFICE VISIT (OUTPATIENT)
Dept: FAMILY MEDICINE CLINIC | Age: 55
End: 2020-07-20
Payer: COMMERCIAL

## 2020-07-20 VITALS
HEIGHT: 70 IN | BODY MASS INDEX: 39.8 KG/M2 | HEART RATE: 72 BPM | TEMPERATURE: 98.6 F | WEIGHT: 278 LBS | DIASTOLIC BLOOD PRESSURE: 80 MMHG | OXYGEN SATURATION: 97 % | SYSTOLIC BLOOD PRESSURE: 128 MMHG

## 2020-07-20 LAB
CREATININE URINE POCT: 300
HBA1C MFR BLD: 5.9 %
MICROALBUMIN/CREAT 24H UR: 80 MG/G{CREAT}
MICROALBUMIN/CREAT UR-RTO: ABNORMAL

## 2020-07-20 PROCEDURE — 83036 HEMOGLOBIN GLYCOSYLATED A1C: CPT | Performed by: FAMILY MEDICINE

## 2020-07-20 PROCEDURE — 82044 UR ALBUMIN SEMIQUANTITATIVE: CPT | Performed by: FAMILY MEDICINE

## 2020-07-20 PROCEDURE — 99214 OFFICE O/P EST MOD 30 MIN: CPT | Performed by: FAMILY MEDICINE

## 2020-07-20 RX ORDER — METOPROLOL SUCCINATE 25 MG/1
TABLET, EXTENDED RELEASE ORAL
Qty: 90 TABLET | Refills: 3 | Status: SHIPPED | OUTPATIENT
Start: 2020-07-20 | End: 2020-08-03

## 2020-07-20 RX ORDER — HYDROCHLOROTHIAZIDE 25 MG/1
25 TABLET ORAL EVERY MORNING
Qty: 30 TABLET | Refills: 0 | Status: SHIPPED | OUTPATIENT
Start: 2020-07-20 | End: 2021-10-25 | Stop reason: CLARIF

## 2020-07-20 NOTE — PROGRESS NOTES
Dalton Davidson is a 63 y.o. male. 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 and has had occult cortisone shots but they stopped working  Meds, vitamins and allergies reviewed with pt    ROS: No TIA's or unusual headaches, no dysphagia. No prolonged cough. No dyspnea or chest pain on exertion. No abdominal pain, change in bowel habits, black or bloody stools. No urinary tract symptoms. No new or unusual musculoskeletal symptoms. Prior to Visit Medications    Medication Sig Taking?  Authorizing Provider   tamsulosin (FLOMAX) 0.4 MG capsule TAKE 1 CAPSULE DAILY  Moni Fair MD   verapamil (CALAN SR) 120 MG extended release tablet TAKE 1 TABLET DAILY  Moni Fair MD   gabapentin (NEURONTIN) 300 MG capsule TAKE 1 CAPSULE THREE TIMES A DAY  Moni Fair MD   ONE TOUCH ULTRA TEST strip USE 1 STRIP  DAILY AS NEEDED  Moni Fair MD   hydroCHLOROthiazide (HYDRODIURIL) 25 MG tablet Take 1 tablet by mouth every morning  Moni Fair MD   ibuprofen (ADVIL;MOTRIN) 800 MG tablet Take 1 tablet by mouth 3 times daily (with meals)  Patient not taking: Reported on 5/12/2020  Moni Fair MD   tiZANidine (ZANAFLEX) 4 MG tablet TAKE 1 TABLET BY MOUTH EVERY 8 HOURS AS NEEDED FOR MUSCLE SPASM  Patient not taking: Reported on 5/12/2020  Moni Fair MD   metoprolol succinate (TOPROL XL) 25 MG extended release tablet TAKE 1 TABLET DAILY  Lam Ro MD   Multiple Vitamins-Minerals (BARIATRIC FUSION) CHEW Take 3 tablets by mouth daily  Historical Provider, MD   metoprolol succinate (TOPROL XL) 25 MG extended release tablet Take 1 tablet by mouth daily  Rhae , MD   ibuprofen (IBU) 800 MG tablet Take 1 tablet by mouth every 6 hours as needed for Pain.   Patient not taking: Reported on 2020  Chano Echavarria MD       Past Medical History:   Diagnosis Date    Arthritis     Diabetes mellitus type 2 in obese [facility])     [facility] mellitus type 2 in obese [facility]     DM type 2 (diabetes mellitus, type 2) ([facility]     HTN     HTN (hypertension)      replace inactive diagnosis    Lymphedema     Morbid obesity with BMI of 50.0-59.9, adult ([facility])     Neuropathy     feet    Neuropathy     feet    Neuropathy     feet    Obesity     HANNAH (obstructive sleep apnea) 2016    bipap    Stasis dermatitis of both legs     Tobacco abuse, continuous        Social History     Tobacco Use    Smoking status: Former Smoker     Packs/day: 1.00     Years: 43.00     Pack years: 43.00     Types: Cigarettes     Last attempt to quit: 2016     Years since quittin.2    Smokeless tobacco: Never Used    Tobacco comment: started smoking at age 14 - smoked up to 1 p.p.d    Substance Use Topics    Alcohol use: No     Alcohol/week: 0.0 standard drinks    Drug use: No       Family History   Problem Relation Age of Onset    Diabetes Mother     Cancer Mother     Arthritis Mother     Glaucoma Mother     Migraines Mother     Substance Abuse Father     Arthritis Father     Heart Disease Father     High Blood Pressure Father     Migraines Sister        Allergies   Allergen Reactions    Lisinopril Hives    Amoxicillin-Pot Clavulanate Diarrhea       OBJECTIVE:  /80   Pulse 72   Temp 98.6 °F (37 °C)   Ht 5' 9.92\" (1.776 m)   Wt 278 lb (126.1 kg)   SpO2 97%   BMI 39.98 kg/m²   GEN:  in NAD, obese weight stable  NECK: Supple without adenopathy. No bruits  CV:  Regular rate and rhythm, S1 and S2 normal, no murmurs, clicks  PULM:  Chest is clear, no wheezing ,  symmetric air entry throughout both lung fields. EXT bilateral dark discoloration in the ankles with 1+ edema nonpitting in the right ankle and 2+ nonpitting edema in the left ankle  NEURO: nonfocal    Lab Results   Component Value Date    LABA1C 5.9 07/20/2020     Lab Results   Component Value Date    EAG 96.8 09/25/2018     ASSESSMENT/PLAN:  1. Diabetes mellitus type 2 in obese (HCC)  Stable off medication  -  DIABETES FOOT EXAM  - POCT glycosylated hemoglobin (Hb A1C)  Eye doctor yearly    2. Essential hypertension  Stable, continue present medication    3. Morbid obesity with BMI of 40.0-44.9, adult ([facility]  Set a goal of patient to snack less and try to get down to 250 pounds within the next 6 months this may well help his right knee feel better and put off the need for surgery    4.  Neuropathy  Stable, continue present medication    5 knee djd - sees ortho under [facility], cortisone shots are not working much any more  Wants to try synvisc next follow-up with Ortho    6 health maintenance/immunizations  Will discuss at next visit patient not interested in shingles at this time  Low-dose chest CT and diabetic eye exam are also optional test we can do once COVID has settled down      Spent 25 minutes with patient greater than 50% of time reviewing his medications, his most recent labs, and discussing weight loss techniques and coordinating his care

## 2020-07-20 NOTE — PROGRESS NOTES
Visual inspection:  Deformity/amputation: absent  Skin lesions/pre-ulcerative calluses: absent  Edema: right- trace, left- trace    Sensory exam:  Monofilament sensation: normal  (minimum of 5 random plantar locations tested, avoiding callused areas - > 1 area with absence of sensation is + for neuropathy)    Plus at least one of the following:  Pulses: normal,   Pinprick: Intact  Proprioception: Intact  Vibration (128 Hz):  Intact

## 2020-08-03 RX ORDER — METOPROLOL SUCCINATE 25 MG/1
TABLET, EXTENDED RELEASE ORAL
Qty: 90 TABLET | Refills: 2 | Status: SHIPPED | OUTPATIENT
Start: 2020-08-03 | End: 2021-06-29

## 2020-08-03 NOTE — TELEPHONE ENCOUNTER
Medication:   Requested Prescriptions     Pending Prescriptions Disp Refills    metoprolol succinate (TOPROL XL) 25 MG extended release tablet [Pharmacy Med Name: METOPROLOL SUCCINATE ER TABS 25MG] 90 tablet 3     Sig: TAKE 1 TABLET DAILY (NEED AN OFFICE VISIT FOR ADDITIONAL REFILLS)       Last Filled:  7/20/20 #90, 3 requesting mail order rx     Patient Phone Number: [phone] (home) [phone] (work)    Last appt: 7/20/2020 DM   Next appt: 1/25/2021    Lab Results   Component Value Date     03/19/2020    K 4.6 03/19/2020     03/19/2020    CO2 24 03/19/2020    BUN 16 03/19/2020    CREATININE 0.9 03/19/2020    GLUCOSE 108 (H) 03/19/2020    CALCIUM 9.4 03/19/2020    PROT 6.9 03/19/2020    LABALBU 4.1 03/19/2020    BILITOT 1.0 03/19/2020    ALKPHOS 120 03/19/2020    AST 19 03/19/2020    ALT 13 03/19/2020    LABGLOM >60 03/19/2020    GFRAA >60 03/19/2020    AGRATIO 1.5 03/19/2020    GLOB 2.8 03/19/2020

## 2020-11-23 ENCOUNTER — OFFICE VISIT (OUTPATIENT)
Dept: PRIMARY CARE CLINIC | Age: 55
End: 2020-11-23
Payer: COMMERCIAL

## 2020-11-23 PROCEDURE — 99211 OFF/OP EST MAY X REQ PHY/QHP: CPT | Performed by: NURSE PRACTITIONER

## 2020-11-23 NOTE — PATIENT INSTRUCTIONS

## 2020-11-25 ENCOUNTER — TELEPHONE (OUTPATIENT)
Dept: FAMILY MEDICINE CLINIC | Age: 55
End: 2020-11-25

## 2020-11-25 LAB — SARS-COV-2, NAA: NOT DETECTED

## 2020-11-27 ENCOUNTER — TELEPHONE (OUTPATIENT)
Dept: FAMILY MEDICINE CLINIC | Age: 55
End: 2020-11-27

## 2020-11-27 NOTE — TELEPHONE ENCOUNTER
Covid test is negative.   Continue to stay away from your family members that are positive until their 10-day quarantine is over

## 2020-11-30 ENCOUNTER — TELEPHONE (OUTPATIENT)
Dept: FAMILY MEDICINE CLINIC | Age: 55
End: 2020-11-30

## 2020-11-30 NOTE — TELEPHONE ENCOUNTER
Patient's mother, father, sister and niece have tested positive for Covid. He tested negative on November 23 but has been quarantining anyway. He wants Dr. Margo Waters opinion on whether he should be tested again before leaving quarantine. He lives with his mother, father and sister.

## 2020-12-02 ENCOUNTER — OFFICE VISIT (OUTPATIENT)
Dept: PRIMARY CARE CLINIC | Age: 55
End: 2020-12-02
Payer: COMMERCIAL

## 2020-12-02 PROCEDURE — 99211 OFF/OP EST MAY X REQ PHY/QHP: CPT | Performed by: NURSE PRACTITIONER

## 2020-12-02 NOTE — PROGRESS NOTES
Selma Leelee received a viral test for COVID-19. They were educated on isolation and quarantine as appropriate. For any symptoms, they were directed to seek care from their PCP, given contact information to establish with a doctor, directed to an urgent care or the emergency room.

## 2020-12-02 NOTE — PATIENT INSTRUCTIONS

## 2020-12-04 LAB — SARS-COV-2, NAA: NOT DETECTED

## 2020-12-16 ENCOUNTER — PATIENT MESSAGE (OUTPATIENT)
Dept: FAMILY MEDICINE CLINIC | Age: 55
End: 2020-12-16

## 2020-12-16 ENCOUNTER — TELEPHONE (OUTPATIENT)
Dept: FAMILY MEDICINE CLINIC | Age: 55
End: 2020-12-16

## 2020-12-16 NOTE — TELEPHONE ENCOUNTER
Pt states his employer is requesting work excuse for pt being off work from 11/19/20 - 12/7/20 due to Covid-19

## 2020-12-16 NOTE — LETTER
NOTIFICATION RETURN TO WORK / SCHOOL    12/16/2020    Mr. Jorge Foster  [facility] [address]      To Whom It May Concern:    Jorge Foster was tested for COVID-19 on 11/23, and the result was negative. He may return to work on 12/7/2020  I recommend:return without restrictions    If there are questions or concerns, please have the patient contact our office.         Sincerely,      Shalonda Rosales MD

## 2020-12-16 NOTE — LETTER
[address]  Phone: [phone]  Fax: [phone]          December 17, 2020    NOTIFICATION RETURN TO WORK / SCHOOL     12/16/2020     Mr. Iliana Hernandez  [facility] [address]        To Whom It May Concern:     Iliana Hernandez had COVID related symptoms and was tested for COVID-19 on 11/23.     Patient was off work starting 11/20/2020.     He may return to work on 12/7/2020  I recommend:return without restrictions     If there are questions or concerns, please have the patient contact our office.           Sincerely,        Ravi Redman MD

## 2020-12-16 NOTE — TELEPHONE ENCOUNTER
Called and spoke with patient, he explained that he needed note to have from 11/20/2020 to 12/07/2020. Work sent him home on the 11/19/2020, patient was unable to get tested until the 11/23/2020.

## 2020-12-17 NOTE — TELEPHONE ENCOUNTER
Patient received the letter but he would prefer that it doesn't say anything about his COVID results. He would like it to just say COVID related. Please email to [email or web address] and also mail a copy to his home address.

## 2021-01-12 ENCOUNTER — NURSE TRIAGE (OUTPATIENT)
Dept: OTHER | Facility: CLINIC | Age: 56
End: 2021-01-12

## 2021-01-12 NOTE — TELEPHONE ENCOUNTER
Reason for Disposition   Caller has already spoken with the PCP (or office), and has no further questions    Protocols used: NO CONTACT OR DUPLICATE CONTACT CALL-ADULT-[place]    Patient is having no new symptoms since he last spoke to the MD. Patient also has an berto today at 10:30 and just had questions about his berto.      Electronically signed by Dominick Lin RN on 1/12/2021 at 9:37 AM

## 2021-01-13 ENCOUNTER — TELEPHONE (OUTPATIENT)
Dept: FAMILY MEDICINE CLINIC | Age: 56
End: 2021-01-13

## 2021-01-13 ENCOUNTER — OFFICE VISIT (OUTPATIENT)
Dept: FAMILY MEDICINE CLINIC | Age: 56
End: 2021-01-13
Payer: COMMERCIAL

## 2021-01-13 VITALS
SYSTOLIC BLOOD PRESSURE: 136 MMHG | BODY MASS INDEX: 39.08 KG/M2 | TEMPERATURE: 97.4 F | OXYGEN SATURATION: 96 % | HEIGHT: 70 IN | HEART RATE: 70 BPM | WEIGHT: 273 LBS | DIASTOLIC BLOOD PRESSURE: 86 MMHG

## 2021-01-13 DIAGNOSIS — G47.33 OSA (OBSTRUCTIVE SLEEP APNEA): ICD-10-CM

## 2021-01-13 DIAGNOSIS — I10 ESSENTIAL HYPERTENSION: ICD-10-CM

## 2021-01-13 DIAGNOSIS — E66.9 DIABETES MELLITUS TYPE 2 IN OBESE (HCC): Primary | ICD-10-CM

## 2021-01-13 DIAGNOSIS — I89.0 LYMPHEDEMA: ICD-10-CM

## 2021-01-13 DIAGNOSIS — Z23 FLU VACCINE NEED: ICD-10-CM

## 2021-01-13 DIAGNOSIS — E66.9 DIABETES MELLITUS TYPE 2 IN OBESE (HCC): ICD-10-CM

## 2021-01-13 DIAGNOSIS — E11.69 DIABETES MELLITUS TYPE 2 IN OBESE (HCC): ICD-10-CM

## 2021-01-13 DIAGNOSIS — E66.9 OBESITY (BMI 30-39.9): ICD-10-CM

## 2021-01-13 DIAGNOSIS — E11.69 DIABETES MELLITUS TYPE 2 IN OBESE (HCC): Primary | ICD-10-CM

## 2021-01-13 PROCEDURE — 99214 OFFICE O/P EST MOD 30 MIN: CPT | Performed by: FAMILY MEDICINE

## 2021-01-13 PROCEDURE — 90686 IIV4 VACC NO PRSV 0.5 ML IM: CPT | Performed by: FAMILY MEDICINE

## 2021-01-13 PROCEDURE — 90471 IMMUNIZATION ADMIN: CPT | Performed by: FAMILY MEDICINE

## 2021-01-13 RX ORDER — GABAPENTIN 300 MG/1
CAPSULE ORAL
Qty: 360 CAPSULE | Refills: 3 | Status: SHIPPED | OUTPATIENT
Start: 2021-01-13 | End: 2021-04-13

## 2021-01-13 SDOH — ECONOMIC STABILITY: FOOD INSECURITY: WITHIN THE PAST 12 MONTHS, YOU WORRIED THAT YOUR FOOD WOULD RUN OUT BEFORE YOU GOT MONEY TO BUY MORE.: NEVER TRUE

## 2021-01-13 SDOH — ECONOMIC STABILITY: FOOD INSECURITY: WITHIN THE PAST 12 MONTHS, THE FOOD YOU BOUGHT JUST DIDN'T LAST AND YOU DIDN'T HAVE MONEY TO GET MORE.: NEVER TRUE

## 2021-01-13 ASSESSMENT — PATIENT HEALTH QUESTIONNAIRE - PHQ9
SUM OF ALL RESPONSES TO PHQ QUESTIONS 1-9: 2
SUM OF ALL RESPONSES TO PHQ QUESTIONS 1-9: 2
1. LITTLE INTEREST OR PLEASURE IN DOING THINGS: 1

## 2021-01-13 NOTE — TELEPHONE ENCOUNTER
----- Message from Shirley sent at 1/13/2021  5:09 PM EST -----  Subject: Message to Provider    QUESTIONS  Information for Provider? Patient called in stating that the FMLA form is   supposed to be faxed to [phone]. Please call back when FMLA form is   faxed. ---------------------------------------------------------------------------  --------------  Nadeem LANG  What is the best way for the office to contact you? OK to leave message on   voicemail  Preferred Call Back Phone Number? [phone]  ---------------------------------------------------------------------------  --------------  SCRIPT ANSWERS  Relationship to Patient?  Self

## 2021-01-13 NOTE — PROGRESS NOTES
Sumeet Garcia is a 63 y.o. male. 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 No prolonged cough. No dyspnea or chest pain on exertion. No abdominal pain, change in bowel habits, black or bloody stools. No urinary tract symptoms. No new or unusual musculoskeletal symptoms. Prior to Visit Medications    Medication Sig Taking?  Authorizing Provider   gabapentin (NEURONTIN) 300 MG capsule 2 po bid Yes Liza Hastings MD   metoprolol succinate (TOPROL XL) 25 MG extended release tablet TAKE 1 TABLET DAILY (NEED AN OFFICE VISIT FOR ADDITIONAL REFILLS) Yes Liza Hastings MD   hydroCHLOROthiazide (HYDRODIURIL) 25 MG tablet Take 1 tablet by mouth every morning Yes Liza Hastings MD   tamsulosin (FLOMAX) 0.4 MG capsule TAKE 1 CAPSULE DAILY Yes Liza Hastings MD   verapamil (CALAN SR) 120 MG extended release tablet TAKE 1 TABLET DAILY Yes Liza Hastings MD   ONE TOUCH ULTRA TEST strip USE 1 STRIP  DAILY AS NEEDED Yes Liza Hastings MD   ibuprofen (ADVIL;MOTRIN) 800 MG tablet Take 1 tablet by mouth 3 times daily (with meals) Yes Liza Hastings MD tiZANidine (ZANAFLEX) 4 MG tablet TAKE 1 TABLET BY MOUTH EVERY 8 HOURS AS NEEDED FOR MUSCLE SPASM Yes Iglesia Valladares MD   Multiple Vitamins-Minerals (BARIATRIC FUSION) CHEW Take 3 tablets by mouth daily Yes Historical Provider, MD   ibuprofen (IBU) 800 MG tablet Take 1 tablet by mouth every 6 hours as needed for Pain. Yes Mehnza Doe MD       Past Medical History:   Diagnosis Date    Arthritis     Diabetes mellitus type 2 in obese ([facility]     Diabetes mellitus type 2 in obese ([facility]     DM type 2 (diabetes mellitus, type 2) ([facility]     HTN     HTN (hypertension)      replace inactive diagnosis    Lymphedema     Morbid obesity with BMI of 50.0-59.9, adult (HCC)     Neuropathy     feet    Neuropathy     feet    Neuropathy     feet    Obesity     HANNAH (obstructive sleep apnea) 2016    bipap    Stasis dermatitis of both legs     Tobacco abuse, continuous        Social History     Tobacco Use    Smoking status: Former Smoker     Packs/day: 1.00     Years: 43.00     Pack years: 43.00     Types: Cigarettes     Quit date: 2016     Years since quittin.7    Smokeless tobacco: Never Used    Tobacco comment: started smoking at age 14 - smoked up to 1 p.p.d    Substance Use Topics    Alcohol use: No     Alcohol/week: 0.0 standard drinks    Drug use: No       Family History   Problem Relation Age of Onset    Diabetes Mother     Cancer Mother     Arthritis Mother     Glaucoma Mother     Migraines Mother     Substance Abuse Father     Arthritis Father     Heart Disease Father     High Blood Pressure Father     Migraines Sister        Allergies   Allergen Reactions    Lisinopril Hives    Amoxicillin-Pot Clavulanate Diarrhea       OBJECTIVE:  /86   Pulse 70   Temp 97.4 °F (36.3 °C)   Ht 5' 9.92\" (1.776 m)   Wt 273 lb (123.8 kg)   SpO2 96%   BMI 39.26 kg/m²   GEN:  in NAD, obese but weight has stabilized  NECK:  Supple without adenopathy.   No bruits CV:  Regular rate and rhythm, S1 and S2 normal, no murmurs, clicks  PULM:  Chest is clear, no wheezing ,  symmetric air entry throughout both lung fields. EXT: Running stasis dermatitis with hyperpigmentation 1 to 2 mm of pitting edema bilaterally  NEURO: nonfocal  Lab Results   Component Value Date    LABA1C 5.9 07/20/2020     Lab Results   Component Value Date    EAG 96.8 09/25/2018     ASSESSMENT/PLAN:  1. Diabetes mellitus type 2 in obese ([address]  Stable  Two for diabetic eye exam  A1c today    2. Essential hypertension  Stable, continue present medication    3. Lymphedema  FMLA form renewed  Elevate feet   consider compression stockings   further weight loss  No open ulcerations at this time    4. Obesity (BMI 30-39. 9)  Encourage him to go on a low-carb diet exercise more with the knee permitting and try to get his weight down to 250    5.  HANNAH (obstructive sleep apnea)  CPAP compliant    6 right knee pain  Follow-up with Ortho for another Synvisc trial if no better consider total knee replacement    #7 munization/health maintenance  Flu shot today  Shingrix shot in 3 months  Diabetic eye exam  And office in 3 months

## 2021-01-13 NOTE — LETTER
[address]  Phone: [phone]  Fax: [phone]    Thor Carballo MD        January 13, 2021     Patient: Desiree Mora   YOB: 1965   Date of Visit: 1/13/2021       To Whom it May Concern: It is in my medical opinion that patient be excuse from work from 1/11/2021 through 1/15/2020 due to medical condition and FMLA reasons. He is to return to work on 01/18/2021. Thank you for your cooperation. If you have any questions or concerns, please don't hesitate to call.     Sincerely,         Thor Carballo MD

## 2021-01-14 ENCOUNTER — TELEPHONE (OUTPATIENT)
Dept: FAMILY MEDICINE CLINIC | Age: 56
End: 2021-01-14

## 2021-01-14 LAB
A/G RATIO: 1.7 (ref 1.1–2.2)
ALBUMIN SERPL-MCNC: 4.3 G/DL (ref 3.4–5)
ALP BLD-CCNC: 166 U/L (ref 40–129)
ALT SERPL-CCNC: 19 U/L (ref 10–40)
ANION GAP SERPL CALCULATED.3IONS-SCNC: 11 MMOL/L (ref 3–16)
AST SERPL-CCNC: 21 U/L (ref 15–37)
BILIRUB SERPL-MCNC: 0.8 MG/DL (ref 0–1)
BUN BLDV-MCNC: 16 MG/DL (ref 7–20)
CALCIUM SERPL-MCNC: 9.9 MG/DL (ref 8.3–10.6)
CHLORIDE BLD-SCNC: 102 MMOL/L (ref 99–110)
CHOLESTEROL, FASTING: 146 MG/DL (ref 0–199)
CO2: 27 MMOL/L (ref 21–32)
CREAT SERPL-MCNC: 1 MG/DL (ref 0.9–1.3)
ESTIMATED AVERAGE GLUCOSE: 111.2 MG/DL
GFR AFRICAN AMERICAN: >60
GFR NON-AFRICAN AMERICAN: >60
GLOBULIN: 2.6 G/DL
GLUCOSE BLD-MCNC: 139 MG/DL (ref 70–99)
HBA1C MFR BLD: 5.5 %
HDLC SERPL-MCNC: 52 MG/DL (ref 40–60)
LDL CHOLESTEROL CALCULATED: 69 MG/DL
POTASSIUM SERPL-SCNC: 5.3 MMOL/L (ref 3.5–5.1)
SODIUM BLD-SCNC: 140 MMOL/L (ref 136–145)
TOTAL PROTEIN: 6.9 G/DL (ref 6.4–8.2)
TRIGLYCERIDE, FASTING: 126 MG/DL (ref 0–150)
VLDLC SERPL CALC-MCNC: 25 MG/DL

## 2021-01-14 NOTE — TELEPHONE ENCOUNTER
Pt needs a revised work excuse. Letter must show off work 1/11/21 - 1/18/21. Pt can return to work on 01/18/21. Fax to his work, fax #[phone], attn:  Evie Leyva pt when faxed.

## 2021-01-14 NOTE — LETTER
[address]  Phone: [phone]  Fax: [phone]     Jose Armando Best MD           January 13, 2021      Patient: Kym Nazario   YOB: 1965   Date of Visit: 1/13/2021         To Whom it May Concern:     It is in my medical opinion that patient be excuse from work from 1/11/2021 through 1/15/2020 due to medical condition and FMLA reasons.   He is to return to work on 01/18/2021.     Thank you for your cooperation.     If you have any questions or concerns, please don't hesitate to call.     Sincerely,            Jose Armando Best MD

## 2021-02-01 ENCOUNTER — TELEPHONE (OUTPATIENT)
Dept: FAMILY MEDICINE CLINIC | Age: 56
End: 2021-02-01

## 2021-02-02 ENCOUNTER — HOSPITAL ENCOUNTER (EMERGENCY)
Age: 56
Discharge: HOME | End: 2021-02-02
Payer: OTHER GOVERNMENT

## 2021-02-02 ENCOUNTER — APPOINTMENT (OUTPATIENT)
Dept: GENERAL RADIOLOGY | Age: 56
End: 2021-02-02
Payer: OTHER GOVERNMENT

## 2021-02-02 VITALS
DIASTOLIC BLOOD PRESSURE: 118 MMHG | HEART RATE: 65 BPM | WEIGHT: 270 LBS | BODY MASS INDEX: 38.65 KG/M2 | TEMPERATURE: 97.7 F | RESPIRATION RATE: 17 BRPM | OXYGEN SATURATION: 97 % | HEIGHT: 70 IN | SYSTOLIC BLOOD PRESSURE: 192 MMHG

## 2021-02-02 DIAGNOSIS — Z91.81 STATUS POST FALL: Primary | ICD-10-CM

## 2021-02-02 DIAGNOSIS — S39.012A STRAIN OF LUMBAR REGION, INITIAL ENCOUNTER: ICD-10-CM

## 2021-02-02 DIAGNOSIS — M53.9 MULTILEVEL DEGENERATIVE DISC DISEASE: ICD-10-CM

## 2021-02-02 DIAGNOSIS — S16.1XXA CERVICAL STRAIN, ACUTE, INITIAL ENCOUNTER: ICD-10-CM

## 2021-02-02 DIAGNOSIS — I10 ELEVATED BLOOD PRESSURE READING WITH DIAGNOSIS OF HYPERTENSION: ICD-10-CM

## 2021-02-02 PROCEDURE — 72040 X-RAY EXAM NECK SPINE 2-3 VW: CPT

## 2021-02-02 PROCEDURE — 99282 EMERGENCY DEPT VISIT SF MDM: CPT

## 2021-02-02 PROCEDURE — 72100 X-RAY EXAM L-S SPINE 2/3 VWS: CPT

## 2021-02-02 RX ORDER — LIDOCAINE 4 G/G
1 PATCH TOPICAL ONCE
Status: DISCONTINUED | OUTPATIENT
Start: 2021-02-02 | End: 2021-02-02 | Stop reason: HOSPADM

## 2021-02-02 RX ORDER — NAPROXEN 500 MG/1
500 TABLET ORAL 2 TIMES DAILY PRN
Qty: 20 TABLET | Refills: 0 | Status: SHIPPED | OUTPATIENT
Start: 2021-02-02 | End: 2021-02-12

## 2021-02-02 RX ORDER — METHOCARBAMOL 750 MG/1
750 TABLET, FILM COATED ORAL EVERY 8 HOURS PRN
Qty: 30 TABLET | Refills: 0 | Status: SHIPPED | OUTPATIENT
Start: 2021-02-02 | End: 2021-02-12

## 2021-02-02 ASSESSMENT — ENCOUNTER SYMPTOMS
ABDOMINAL PAIN: 0
DIARRHEA: 0
SHORTNESS OF BREATH: 0
CHEST TIGHTNESS: 0
NAUSEA: 0
BACK PAIN: 1
VOMITING: 0

## 2021-02-02 ASSESSMENT — PAIN SCALES - GENERAL: PAINLEVEL_OUTOF10: 8

## 2021-02-02 NOTE — LETTER
[facility] Emergency Department  [address] [address]  Phone: [phone]               February 2, 2021    Patient: Levon Salcedo   YOB: 1965   Date of Visit: 2/2/2021       To Whom It May Concern:    Levon Salcedo was seen and treated in our emergency department on 2/2/2021. He may return to work on 2/4/21.       Sincerely,               Signature:__________________________________

## 2021-02-02 NOTE — ED PROVIDER NOTES
[facility]        Pt Name: Josefina Walker  MRN: [id number]  [place] 1965  Date of evaluation: 2/2/2021  Provider: Dawn Kuo PA-C  PCP: Kinjal Mcallister MD    EMILY. I have evaluated this patient. My supervising physician was available for consultation. CHIEF COMPLAINT       Chief Complaint   Patient presents with    Back Pain     Pt states he slipped and fell on some ice, now having lower back pain. Reports he did hit his head on the ground, not on blood thinners. Works for [employer], wants to file [address]   (Location, Timing/Onset, Context/Setting, Quality, Duration, Modifying Factors, Severity, Associated Signs and Symptoms)  Note limiting factors. Valerie Francis is a 63 y.o. male resents the emergency department with reports of an industrial accident. Patient states that he works for [employer]. On Friday he had an injury from a fall when he slipped on the ice. He states his feet went out from underneath him he landed on his buttock and fell backwards striking the posterior aspect of his head. He did not blackout or lose consciousness and is not currently anticoagulated and has not had ongoing symptoms related to this other than some achy pain in the posterior aspect of his head since the injury from the fall. He has been treating this with ibuprofen in the home environment. He states he took 800 mg this morning. He states that he is not having red flags for neck or back pain. He denies bowel or bladder dysfunction denies saddle anesthesia is able to independently ambulate drove himself here to the emergency department today. His current level of pain and discomfort rates to be 8 out of 10. He is found nothing to make the symptoms better and or worsen with this he presents to the ED for evaluation and treatment. Patient has no additional complaints or concerns voiced at the present time. Nursing Notes were all reviewed and agreed with or any disagreements were addressed in the HPI. REVIEW OF SYSTEMS    (2-9 systems for level 4, 10 or more for level 5)     Review of Systems   Constitutional: Negative for activity change, chills and fever. Respiratory: Negative for chest tightness and shortness of breath. Cardiovascular: Negative for chest pain. Gastrointestinal: Negative for abdominal pain, diarrhea, nausea and vomiting. Genitourinary: Negative for dysuria and flank pain. Musculoskeletal: Positive for back pain and neck pain. Skin: Negative for rash and wound. Neurological: Negative for headaches. Positives and Pertinent negatives as per HPI. Except as noted above in the ROS, all other systems were reviewed and negative.        PAST MEDICAL HISTORY     Past Medical History:   Diagnosis Date    Arthritis     Diabetes mellitus type 2 in obese ([facility]     Diabetes mellitus type 2 in obese ([facility]     DM type 2 (diabetes mellitus, type 2) ([facility]     HTN     HTN (hypertension)      replace inactive diagnosis    Lymphedema     Morbid obesity with BMI of 50.0-59.9, adult ([facility]     Neuropathy     feet    Neuropathy     feet    Neuropathy     feet    Obesity     HANNAH (obstructive sleep apnea) 04/19/2016    bipap    Stasis dermatitis of both legs     Tobacco abuse, continuous          SURGICAL HISTORY     Past Surgical History:   Procedure Laterality Date    APPENDECTOMY      COLONOSCOPY      KNEE ARTHROSCOPY      OTHER SURGICAL HISTORY  12/14/2016    LAPAROSCOPIC SLEEVE GASTRECTOMY           UPPER GASTROINTESTINAL ENDOSCOPY  1/15/16         CURRENTMEDICATIONS       Previous Medications    GABAPENTIN (NEURONTIN) 300 MG CAPSULE    2 po bid    HYDROCHLOROTHIAZIDE (HYDRODIURIL) 25 MG TABLET    Take 1 tablet by mouth every morning    METOPROLOL SUCCINATE (TOPROL XL) 25 MG EXTENDED RELEASE TABLET    TAKE 1 TABLET DAILY (NEED AN OFFICE VISIT FOR ADDITIONAL REFILLS)    MULTIPLE VITAMINS-MINERALS (BARIATRIC FUSION) CHEW    Take 3 tablets by mouth daily    ONE TOUCH ULTRA TEST STRIP    USE 1 STRIP  DAILY AS NEEDED    TAMSULOSIN (FLOMAX) 0.4 MG CAPSULE    TAKE 1 CAPSULE DAILY    VERAPAMIL (CALAN SR) 120 MG EXTENDED RELEASE TABLET    TAKE 1 TABLET DAILY         ALLERGIES     Lisinopril and Amoxicillin-pot clavulanate    FAMILYHISTORY       Family History   Problem Relation Age of Onset    Diabetes Mother     Cancer Mother     Arthritis Mother     Glaucoma Mother     Migraines Mother     Substance Abuse Father     Arthritis Father     Heart Disease Father     High Blood Pressure Father  Migraines Sister           SOCIAL HISTORY       Social History     Tobacco Use    Smoking status: Former Smoker     Packs/day: 1.00     Years: 43.00     Pack years: 43.00     Types: Cigarettes     Quit date: 2016     Years since quittin.8    Smokeless tobacco: Never Used    Tobacco comment: started smoking at age 14 - smoked up to 1 p.p.d    Substance Use Topics    Alcohol use: No     Alcohol/week: 0.0 standard drinks    Drug use: No       SCREENINGS             PHYSICAL EXAM    (up to 7 for level 4, 8 or more for level 5)     ED Triage Vitals   BP Temp Temp Source Pulse Resp SpO2 Height Weight   21 1013 21 1010 21 1010 21 1010 21 1010 21 1010 21 1010 21 1010   (!) 192/118 97.7 °F (36.5 °C) Oral 65 17 97 % 5' 10\" (1.778 m) 270 lb (122.5 kg)       Physical Exam  Vitals signs and nursing note reviewed. Constitutional:       General: He is awake. He is not in acute distress. Appearance: Normal appearance. He is well-developed. He is not ill-appearing or diaphoretic. Comments: Resting comfortably in the examination room in no evidence of acute painful distress. HENT:      Head: Normocephalic and atraumatic. No raccoon eyes, Vera's sign, abrasion, contusion or laceration. Right Ear: Hearing and external ear normal.      Left Ear: Hearing and external ear normal.      Nose: Nose normal.      Mouth/Throat:      Lips: Pink. Mouth: Mucous membranes are moist.   Eyes:      General: Lids are normal. No scleral icterus. Right eye: No discharge. Left eye: No discharge. Conjunctiva/sclera: Conjunctivae normal.      Pupils: Pupils are equal, round, and reactive to light. Neck:      Musculoskeletal: Normal range of motion. Injury, pain with movement and muscular tenderness present. No spinous process tenderness. Vascular: No JVD. Cardiovascular:      Rate and Rhythm: Normal rate and regular rhythm. Heart sounds: No murmur. No friction rub. No gallop. Pulmonary:      Effort: Pulmonary effort is normal. No accessory muscle usage or respiratory distress. Breath sounds: Normal breath sounds. No wheezing, rhonchi or rales. Abdominal:      General: There is no distension. Palpations: Abdomen is soft. Abdomen is not rigid. There is no mass. Tenderness: There is no abdominal tenderness. There is no guarding or rebound. Musculoskeletal:      Lumbar back: He exhibits decreased range of motion, tenderness and bony tenderness. Comments: Midline tenderness diffuse in nature with no evidence of step-off. Left and right lateral lumbar paraspinous tenderness to palpation. Patient has a negative straight leg raise. The patient demonstrates 5/5 muscle strength to hip flexion, knee flexion/extension, plantar/dorsiflexion of the ankle and extensor hallicus longus testing. Skin:     General: Skin is warm and dry. Neurological:      Mental Status: He is alert and oriented to person, place, and time. GCS: GCS eye subscore is 4. GCS verbal subscore is 5. GCS motor subscore is 6. Cranial Nerves: No cranial nerve deficit. Sensory: No sensory deficit. Coordination: Coordination normal.   Psychiatric:         Behavior: Behavior normal. Behavior is cooperative. DIAGNOSTIC RESULTS   LABS:    Labs Reviewed - No data to display    All other labs were within normal range or not returned as of this dictation. EKG: All EKG's are interpreted by the Emergency Department Physician in the absence of a cardiologist.  Please see their note for interpretation of EKG.       RADIOLOGY:   Non-plain film images such as CT, Ultrasound and MRI are read by the radiologist. Plain radiographic images are visualized and preliminarily interpreted by the ED Provider with the below findings:      Interpretation per the Radiologist below, if available at the time of this note: The patient's detailed history of present illness is documented as above. Upon arrival to the emergency department the patient's vital signs are as documented. The patient is noted to be hemodynamically stable and afebrile. Physical examination findings are as above. Patient's pain was treated as documented above. Because of the traumatic nature of the patient's fall as well as his symptoms I did proceed with cervical spine and lumbar spine radiographs. Head CT is not indicated because he did not lose consciousness is not anticoagulated and he is now 72 hours plus hours removed from the above-mentioned. Cervical spine radiographs demonstrate no evidence of acute fracture or dislocation. Degenerative disc disease is noted on C5 on C6 as well as C6 on C7. Lumbar spine radiographs similarly demonstrate no evidence of acute fracture or dislocation. Thoracolumbar scoliosis is incidentally found as is degenerative changes L3-S1. Medications for the home environment. Strict potential instructions for return. Advanced imaging is not currently indicated. Follow-up with [facility]. The patient has been made aware of the signs and symptoms which would necessitate an immediate return to the emergency department and verbalizes an understanding of these signs and symptoms. Patient is aware that he does have an elevated blood pressure reading here in the emergency department and is been suggested that he follow-up with Dr. Jennifer Ashley his primary care physician.   He has had a history of hypertension in the past. I estimate there is low risk for cauda equina or central cord compression syndrome, epidural lesion or abscess, meningitis or acute/severe spinal stenosis requiring emergent treatment and or any additional pathology that would require further emergency advanced imaging or admission and therefor I consider the discharge disposition most reasonable. The patient and/or family and I have discussed the diagnosis and risks, and we agree with discharging home to follow-up with their primary doctor. We also discussed returning to the emergency department immediately if new or worsening symptoms occur. We have discussed the symptoms which are most concerning (e.g., numbness or weakness of the arms or legs, saddle anesthesia, urinary or bowel incontinence or retention, changing or worsening pain) that would necessitate an immediate return. FINAL IMPRESSION      1. Status post fall    2. Strain of lumbar region, initial encounter    3. Cervical strain, acute, initial encounter    4. Multilevel degenerative disc disease    5. Elevated blood pressure reading with diagnosis of hypertension          DISPOSITION/PLAN   DISPOSITION: Discharged home      PATIENT REFERREDTO:  Venancio Pack MD  [address] [place] [address].   [address] [place] [address]  [phone]      As needed    [address] [place] [address] Emergency Department  [facility]  [phone]    If symptoms worsen      DISCHARGE MEDICATIONS:  New Prescriptions    METHOCARBAMOL (ROBAXIN-750) 750 MG TABLET    Take 1 tablet by mouth every 8 hours as needed (muscle cramps or pain)    NAPROXEN (NAPROSYN) 500 MG TABLET    Take 1 tablet by mouth 2 times daily as needed for Pain       DISCONTINUED MEDICATIONS:  Discontinued Medications    IBUPROFEN (ADVIL;MOTRIN) 800 MG TABLET    Take 1 tablet by mouth 3 times daily (with meals)

## 2021-02-02 NOTE — TELEPHONE ENCOUNTER
Advised pt sister FMLA forms are ready. Pt sister asked for forms to be faxed to [phone] and she will let pt know forms are ready.  Faxed forms and placed at the

## 2021-02-15 ENCOUNTER — HOSPITAL ENCOUNTER (OUTPATIENT)
Dept: PHYSICAL THERAPY | Age: 56
Setting detail: THERAPIES SERIES
Discharge: HOME | End: 2021-02-15
Payer: OTHER GOVERNMENT

## 2021-02-15 ENCOUNTER — TELEPHONE (OUTPATIENT)
Dept: FAMILY MEDICINE CLINIC | Age: 56
End: 2021-02-15

## 2021-02-15 PROCEDURE — 97162 PT EVAL MOD COMPLEX 30 MIN: CPT

## 2021-02-15 PROCEDURE — 97110 THERAPEUTIC EXERCISES: CPT

## 2021-02-15 PROCEDURE — 97140 MANUAL THERAPY 1/> REGIONS: CPT

## 2021-02-15 PROCEDURE — G0283 ELEC STIM OTHER THAN WOUND: HCPCS

## 2021-02-15 NOTE — PLAN OF CARE
SUBJECTIVE: Patient stated complaint:Pt fell on ice 1/29/21 with low back and neck pain. Pt states his pain is improving and he may be ready to go back to work in the next week or 2. Pain levels now min/mod 3-5/10 neck and low back. 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 OBJECTIVE:     Palpation: mod increase tone and tenderness B paraspinals cervical, thoracic, lumbar.     Functional Mobility/Transfers: indep    Posture: fair mod round roman and forward head    Inspection: WNL    Gait: (include devices/WB status) slow, guarded    Bandages/Dressings/Incisions: NA    Dermatomes WFL all Normal Abnormal Comments   Top of head (C1)      Posterior occipital region (C2)      Side of neck (C3)      Top of shoulder (C4)      Lateral deltoid (C5)      Tip of thumb (C6)      Distal middle finger (C7)      Distal fifth finger (C8)      Medial forearm (T1)      inguinal area (L1)       anterior mid-thigh (L2)      distal ant thigh/med knee (L3)      medial lower leg and foot (L4)      lateral lower leg and foot (L5)      posterior calf (S1)      medial calcaneus (S2)          Myotomes WFL all Normal Abnormal Comments   Neck flexion (C1-C2)      Neck sidebending (C3)      Shoulder elevation (C4)      Shoulder abduction (C5) Elbow flexion/wrist extension (C6)      Elbow extension/wrist flexion (C7)      Thumb abduction (C8)      Finger abduction (T1)      Hip flexion (L1-L2)      Knee extension (L2-L4)      Dorsiflexion (L4-L5)      Great Toe Ext (L5)      Ankle Eversion (S1-S2)      Ankle PF(S1-S2)                                                                          ROM  Comments   Cervical Flexion  WFL all but stiff and sore   Cervical Extension          Lumbar Flexion     Lumbar Extension       ROM LEFT RIGHT Comments   Cervical Side Bend   WFL all but stiff and sore   Cervical Rotation      Shoulder Flex      Shoulder Abd      Shoulder ER      Shoulder IR            Lumbar Side Bend      Lumbar Rotation      Hip Flexion      Hip Abd      Hip ER      Hip IR      Hip Extension      Knee Ext      Knee Flex            Hamstring Flex   B min decrease and sore   Piriformis   B mod decrease and sore   quads   R severe decrease from old knee injury and L mod decrease             Strength LEFT RIGHT Comments   Shoulder flexion 3+/5 3+/5 sore   Shoulder scaption Distal 4+/5 Distal 4+/5    Shoulder ER      Shoulder IR      Biceps      Triceps                  Multifidus      Transverse Ab      Hip Flexors 3+/5 3+/5 sore   Hip Abductors Distal 4+/5 Distal 4+/5    Hip Extensors      Hip Internal Rotators      Hip External Rotators              Cervical Joint mobility:    [x]Normal    []Hypo   []Hyper    Thoracic Joint mobility:    [x]Normal    []Hypo   []Hyper    Lumbar Joint mobility:    [x]Normal    []Hypo   []Hyper    Sacral Joint mobility:    [x]Normal    []Hypo   []Hyper      Neural dynamic tension testing Normal Abnormal Comments   ULTT            Slump Test  - Degree of knee flexion:       SLR       0-30 x     30-70 x     Femoral nerve (L2-4) [x] Patient history, allergies, meds reviewed. Medical chart reviewed. See intake form. Review Of Systems (ROS):  [x]Performed Review of systems (Integumentary, CardioPulmonary, Neurological) by intake and observation. Intake form has been scanned into medical record. Patient has been instructed to contact their primary care physician regarding ROS issues if not already being addressed at this time.       Co-morbidities/Complexities (which will affect course of rehabilitation):   []None        []Hx of COVID   Arthritic conditions   []Rheumatoid arthritis (M05.9)  []Osteoarthritis (M19.91)  []Gout   Cardiovascular conditions   [x]Hypertension (I10)  []Hyperlipidemia (E78.5)  []Angina pectoris (I20)  []Atherosclerosis (I70)  []Pacemaker  []Hx of CABG/stent/  cardiac surgeries   Musculoskeletal conditions   []Disc pathology   []Congenital spine pathologies   []Osteoporosis (M81.8)  []Osteopenia (M85.8)  []Scoliosis       Endocrine conditions   []Hypothyroid (E03.9)  []Hyperthyroid Gastrointestinal conditions   []Constipation (V71.22)   Metabolic conditions   [x]Morbid obesity (E66.01)  [x]Diabetes type 1(E10.65) or 2 (E11.65)   []Neuropathy (G60.9)     Cardio/Pulmonary conditions   []Asthma (J45)  []Coughing   []COPD (J44.9)  []CHF  []A-fib   Psychological Disorders  []Anxiety (F41.9)  []Depression (F32.9)   []Other:   Developmental Disorders  []Autism (F84.0)  []CP (G80)  []Down Syndrome (Q90.9)  []Developmental delay     Neurological conditions  []Prior Stroke (I69.30)  []Parkinson's (G20)  []Encephalopathy (G93.40)  []MS (G35)  []Post-polio (G14)  []SCI  []TBI  []ALS Other conditions  []Fibromyalgia (M79.7)  []Vertigo  []Syncope  []Kidney Failure  []Cancer      []currently undergoing                treatment  []Pregnancy  []Incontinence   Prior surgeries  []involved limb  []previous spinal surgery  [] section birth  []hysterectomy []bowel / bladder surgery  []other relevant surgeries   [x]Other:   2019 gastric sleeve, R knee injury 2012           Barriers to/and or personal factors that will affect rehab potential:              []Age  []Sex   []Smoker              []Motivation/Lack of Motivation                        [x]Co-Morbidities              []Cognitive Function, education/learning barriers              []Environmental, home barriers              []profession/work barriers  []past PT/medical experience  []other:  Justification:     Falls Risk Assessment (30 days):   [x] Falls Risk assessed and no intervention required.   [] Falls Risk assessed and Patient requires intervention due to being higher risk   TUG score (>12s at risk):     [] Falls education provided, including         ASSESSMENT:  Functional Impairments:     []Noted cervical/thoracic/lumbar/GHJ/proximal hip hypomobility   []Noted cervical/thoracic/lumbosacral and/or generalized hypermobility   []Decreased cervical/UE and/or lumbosacral/hip/LE functional ROM   []Noted Headache pain aggravated by neck movements with/without dizziness   []Abnormal reflexes/sensation/myotomal/dermatomal deficits   []Decreased DCF control or ability to hold head up   [x]Decreased core/proximal hip strength and neuromuscular control    [x]Decreased RC/scapular/core strength and neuromuscular control    [x]Decreased UE and/or LE functional strength  []Reduced balance/proprioceptive control    [x]other: decrease cervical, LB and LE flexib     Functional Activity Limitations (from functional questionnaire and intake)   [x]Reduced ability to tolerate prolonged functional positions   [x]Reduced ability or difficulty with changes of positions or transfers between positions   [x]Reduced ability to maintain good posture and demonstrate good body mechanics with sitting, bending, and lifting   [] Reduced ability or tolerance with driving and/or computer work [x]Reduced ability to perform lifting, reaching, carrying tasks   []Reduced ability to concentrate   []Reduced ability to sleep    [x]Reduced ability to tolerate any impact through UE or spine   [x]Reduced ability to ambulate prolonged functional periods/distances/surfaces   [x]Reduced ability to squat   [x]Reduced ability to forward bend   []Reduced ability to ascend/descend stairs   []other:    Participation Restrictions   []Reduced participation in self care activities   []Reduced participation in home management activities   [x]Reduced participation in work activities   []Reduced participation in social activities. []Reduced participation in sport/recreational activities. Classification/Subgrouping:   [x]Signs/symptoms consistent with spinal instability/stabilization subgroup. [x]Signs/symptoms consistent with spinal mobilization/manipulation subgroup, myotomes and dermatomes intact. Meets manipulation criteria.     []signs/symptoms consistent with neck pain with mobility deficits     []signs/symptoms consistent with neck pain with movement coordinated impairments    []signs/symptoms consistent with neck pain with radiating pain    []signs/symptoms consistent with neck pain with headaches (cervicogenic)    []Signs/symptoms consistent with nerve root involvement including myotome & dermatome dysfunction   []sign/symptoms consistent with spinal facet dysfunction   []signs/symptoms consistent suggesting central cord compression/UMN syndromes   []Signs/symptoms consistent with Lumbar direction specific/centralization subgroup   []Signs/symptoms consistent with Cervical and/or Lumbar traction subgroup   []signs/symptoms consistent with discogenic cervical pain   []Signs/symptoms consistent with Cervical and/or Lumbar traction subgroup   []signs/symptoms consistent with rib dysfunction   [x]signs/symptoms consistent with postural dysfunction []Signs/symptoms consistent with lumbar stenosis type dysfunction   []signs/symptoms consistent with shoulder pathology    []signs/symptoms consistent with post-surgical status including decreased ROM, strength and function. []signs/symptoms consistent with pathology which may benefit from Dry Needling   []signs/symptoms which may limit the use of advanced manual therapy techniques: (Hypertension, recent trauma, intolerance to end range positions, prior TIA, visual issues, UE myotomes loss )         Prognosis/Rehab Potential:      []Excellent   [x]Good    []Fair   []Poor    Tolerance of evaluation/treatment:    []Excellent   [x]Good    []Fair   []Poor    Physical Therapy Evaluation Complexity Justification  [x] A history of present problem with:  [] no personal factors and/or comorbidities that impact the plan of care;  [x]1-2 personal factors and/or comorbidities that impact the plan of care  []3 personal factors and/or comorbidities that impact the plan of care  [x] An examination of body systems using standardized tests and measures addressing any of the following: body structures and functions (impairments), activity limitations, and/or participation restrictions;:  [] a total of 1-2 or more elements   [x] a total of 3 or more elements   [] a total of 4 or more elements   [x] A clinical presentation with:  [] stable and/or uncomplicated characteristics   [x] evolving clinical presentation with changing characteristics  [] unstable and unpredictable characteristics;   [x] Clinical decision making of [] low, [x] moderate, [] high complexity using standardized patient assessment instrument and/or measurable assessment of functional outcome.     [] EVAL (LOW) 91417 (typically 20 minutes face-to-face)  [x] EVAL (MOD) 45498 (typically 30 minutes face-to-face)  [] EVAL (HIGH) 44101 (typically 45 minutes face-to-face)  [] RE-EVAL     PLAN:   Frequency/Duration:  3 days per week for 3 Weeks:  Interventions: [x]  Therapeutic exercise including: strength training, ROM, for cervical spine,scapula, core and Upper extremity, including postural re-education. [x]  NMR activation and proprioception for Deep cervical flexors, periscapular and RC muscles and Core, including postural re-education. [x]  Manual therapy as indicated for C/T spine, ribs, Soft tissue to include: Dry Needling/IASTM, STM, PROM, Gr I-IV mobilizations, manipulation. [x] Modalities as needed that may include: thermal agents, E-stim, Biofeedback, US, iontophoresis as indicated  [x] Patient education on joint protection, postural re-education, activity modification, progression of HEP. HEP instruction: Written HEP instructions provided and reviewed  Access Code: [email or web address]   URL: [email or web address]   Date: 02/15/2021   Prepared by: Cecelia Gipson     Exercises   Supine Shoulder Flexion AAROM with Dowel - 5 reps - 2 sets - 1x daily - 7x weekly   Seated Cervical Retraction - 5 reps - 2 sets - 1x daily - 7x weekly   Seated Cervical Rotation AROM - 5 reps - 2 sets - 1x daily - 7x weekly   Seated Cervical Retraction and Extension - 5 reps - 2 sets - 1x daily - 7x weekly   Seated Cervical Sidebending AROM - 5 reps - 2 sets - 1x daily - 7x weekly   Seated Scapular Retraction - 10 reps - 2 sets - 1x daily - 7x weekly   Supine Posterior Pelvic Tilt - 10 reps - 2 sets - 1x daily - 7x weekly   Supine Lower Trunk Rotation - 10 reps - 2 sets - 1x daily - 7x weekly   Supine Single Knee to Chest Stretch - 4 reps - 2 sets - 15 hold - 1x daily - 7x weekly   Prone Press Up on Elbows - 4 reps - 2 sets - 1x daily - 7x weekly   Prone Knee Flexion - 5 reps - 2 sets - 1x daily - 7x weekly   GOALS:  Patient stated goal: No pain neck and low back for RTW  [] Progressing: [] Met: [] Not Met: [] Adjusted    Therapist goals for Patient:   Short Term Goals:  To be achieved in: 2 weeks

## 2021-02-15 NOTE — FLOWSHEET NOTE
[facility]  Phone: [phone]   Fax: [phone]    Physical Therapy Daily Treatment Note  Date:  2/15/2021    Patient Name:  Valerie Francis    :  1965  MRN: [id number]  Medical/Treatment Diagnosis Information:  · Diagnosis: S39.012A strain of mm, fascia, tendons low back, S16. 1XXA strain of mm, fascia, tendons cervical region  · Treatment Diagnosis: Decreased flexibility and strength shoulder, neck, LB, LE, increased pain neck and low back. Insurance/Certification information:  PT Insurance Information:  approved 9 visits 2021-2021  Physician Information:  Referring Practitioner: Dr. Alyssa Salinas of care signed (Y/N): []  Yes [x]  No Cosign req 2/15    Date of Patient follow up with Physician: 21   Progress Report: []  Yes  [x]  No     Date Range for reporting period:  Beginning 2/15/21  Ending    Progress report due (10 Rx/or 30 days whichever is less): , send PN to Dr. Reuben Fontenot weekly, visit #9      Recertification due (POC duration/ or 90 days whichever is less): visit #9, 21     Visit # Insurance Allowable Auth required? Date Range    till  [x]  Yes-approved  []  No 2021     Latex Allergy:  [x]NO      []YES  Preferred Language for Healthcare:   [x]English       []other:    Functional Scale:       Date assessed:2/15/2021   NDI 23 Oswestry 23: raw score ; dysfunction = NDI 46%, Oswestry 51%    Pain level:  3-5/10 Neck and low back    SUBJECTIVE:  See eval. Pt sees MD  and feels he may RTW in next week, so only 3 f/u appts made.     OBJECTIVE: See eval      RESTRICTIONS/PRECAUTIONS: HTN, DM, gastric sleeve, hx R knee injury     Exercises/Interventions:     Therapeutic Exercises (78853)  Start time:10am  End time: 10:15 am Resistance / level Sets/sec Reps Notes   HEP performed and gone over see below  15 min     Stepper       swiss ball LB A/P, lat, CW, CCW Head on ball or pillow cervical       IB  HR/TR       Stairs hams and hip flexor stretch L/R       tband mid row, LPD, high row                     Therapeutic Activities (70156)  Start time:  End time:                                   Neuromuscular Re-ed (00282)  Start time:  End time:                                                 Manual Intervention (67286)  Start time: 10:15  End time: 10:30       Prone STM B paraspinals cervical, lumbar mm, esvin UT's, gentle manual quad stretches (R side tight from old knee injury), supine gentle manual stretches B hams, pirif, glut  15 min                                            Modalities: 2/15 15 min start time 10:30-10:45am supine with grey bolster under LE's CP in pillow case to neck, CP with towel over to LB, premod estim to neck and low back, covered with warm blanket. Pt has been using CP and heating pad prn at home. Pt. Education:  -patient educated on diagnosis, prognosis and expectations for rehab  -all patient questions were answered    HEP instruction:  HEP instruction: Written HEP instructions provided and reviewed  Access Code: [email or web address]   URL: [email or web address]   Date: 02/15/2021   Prepared by:  John Mullen     Exercises   · Supine Shoulder Flexion AAROM with Dowel - 5 reps - 2 sets - 1x daily - 7x weekly   · Seated Cervical Retraction - 5 reps - 2 sets - 1x daily - 7x weekly   · Seated Cervical Rotation AROM - 5 reps - 2 sets - 1x daily - 7x weekly   · Seated Cervical Retraction and Extension - 5 reps - 2 sets - 1x daily - 7x weekly   · Seated Cervical Sidebending AROM - 5 reps - 2 sets - 1x daily - 7x weekly   · Seated Scapular Retraction - 10 reps - 2 sets - 1x daily - 7x weekly   · Supine Posterior Pelvic Tilt - 10 reps - 2 sets - 1x daily - 7x weekly   · Supine Lower Trunk Rotation - 10 reps - 2 sets - 1x daily - 7x weekly   · Supine Single Knee to Chest Stretch - 4 reps - 2 sets - 15 hold - 1x daily - 7x weekly · Prone Press Up on Elbows - 4 reps - 2 sets - 1x daily - 7x weekly   · Prone Knee Flexion - 5 reps - 2 sets - 1x daily - 7x weekly     Therapeutic Exercise and NMR EXR  [x] (44842) Provided verbal/tactile cueing for activities related to strengthening, flexibility, endurance, ROM for improvements in  [x] LE / Lumbar: LE, proximal hip, and core control with self care, mobility, lifting, ambulation. [x] UE / Cervical: cervical, postural, scapular, scapulothoracic and UE control with self care, reaching, carrying, lifting, house/yardwork, driving, computer work.  [] (52015) Provided verbal/tactile cueing for activities related to improving balance, coordination, kinesthetic sense, posture, motor skill, proprioception to assist with   [] LE / lumbar: LE, proximal hip, and core control in self care, mobility, lifting, ambulation and eccentric single leg control. [] UE / cervical: cervical, scapular, scapulothoracic and UE control with self care, reaching, carrying, lifting, house/yardwork, driving, computer work.   [] (07663) Therapist is in constant attendance of 2 or more patients providing skilled therapy interventions, but not providing any significant amount of measurable one-on-one time to either patient, for improvements in  [] LE / lumbar: LE, proximal hip, and core control in self care, mobility, lifting, ambulation and eccentric single leg control. [] UE / cervical: cervical, scapular, scapulothoracic and UE control with self care, reaching, carrying, lifting, house/yardwork, driving, computer work.      NMR and Therapeutic Activities:    [] (69022 or 62100) Provided verbal/tactile cueing for activities related to improving balance, coordination, kinesthetic sense, posture, motor skill, proprioception and motor activation to allow for proper function of   [] LE: / Lumbar core, proximal hip and LE with self care and ADLs [] UE / Cervical: cervical, postural, scapular, scapulothoracic and UE control with self care, carrying, lifting, driving, computer work.   [] (94129) Gait Re-education- Provided training and instruction to the patient for proper LE, core and proximal hip recruitment and positioning and eccentric body weight control with ambulation re-education including up and down stairs     Home Management Training / Self Care:  [] (08131) Home Management Training / Self-care: ADLs and compensatory training, meal preparation, safety procedures and instruction in use of adaptive equipment, including bathing, grooming, dressing, personal hygiene, basic household cleaning and chores.      Home Exercise Program:    [x] (03839) Reviewed/Progressed HEP activities related to strengthening, flexibility, endurance, ROM of   [x] LE / Lumbar: core, proximal hip and LE for functional self-care, mobility, lifting and ambulation/stair navigation   [x] UE / Cervical: cervical, postural, scapular, scapulothoracic and UE control with self care, reaching, carrying, lifting, house/yardwork, driving, computer work  [] (96118)Reviewed/Progressed HEP activities related to improving balance, coordination, kinesthetic sense, posture, motor skill, proprioception of   [] LE: core, proximal hip and LE for self care, mobility, lifting, and ambulation/stair navigation    [] UE / Cervical: cervical, postural,  scapular, scapulothoracic and UE control with self care, reaching, carrying, lifting, house/yardwork, driving, computer work    Manual Treatments:  PROM / STM / Oscillations-Mobs:  G-I, II, III, IV (PA's, Inf., Post.)  [x] (89854) Provided manual therapy to mobilize LE, proximal hip and/or LS spine soft tissue/joints for the purpose of modulating pain, promoting relaxation,  increasing ROM, reducing/eliminating soft tissue swelling/inflammation/restriction, improving soft tissue extensibility and allowing for proper ROM for normal function with [x] LE / lumbar: self care, mobility, lifting and ambulation. [x] UE / Cervical: self care, reaching, carrying, lifting, house/yardwork, driving, computer work. Modalities:  [] (15400) Vasopneumatic compression: Utilized vasopneumatic compression to decrease edema / swelling for the purpose of improving mobility and quad tone / recruitment which will allow for increased overall function including but not limited to self-care, transfers, ambulation, and ascending / descending stairs. Charges:  Timed Code Treatment Minutes: 45   Total Treatment Minutes: 60     [] EVAL - LOW (30934)   [x] EVAL - MOD (70239)  [] EVAL - HIGH (65715)  [] RE-EVAL (99564)  [x] ES(65899) x 1      [] Ionto  [] NMR (49318) x       [] Vaso  [x] Manual (06976) x 1      [] Ultrasound  [] TA x        [] Mech Traction (73179)  [] Aquatic Therapy x     [x] ES (un) (59506):1   [] Home Management Training x  [] ES(attended) (11356)   [] Dry Needling 1-2 muscles (39389):  [] Dry Needling 3+ muscles (101655  [] Group:      [] Other:     GOALS:   Patient stated goal: No pain neck and low back for RTW  []? Progressing: []? Met: []? Not Met: []? Adjusted     Therapist goals for Patient:   Short Term Goals: To be achieved in: 2 weeks  1. Independent in HEP and progression per patient tolerance, in order to prevent re-injury. []? Progressing: []? Met: []? Not Met: []? Adjusted  2. Patient will have a decrease in pain to facilitate improvement in movement, function, and ADLs as indicated by Functional Deficits. []? Progressing: []? Met: []? Not Met: []? Adjusted     Long Term Goals: To be achieved in: 3 weeks  1. Disability index score of 10% or less for the NDI and/or LEONA to assist with reaching prior level of function. []? Progressing: []? Met: []? Not Met: []?  Adjusted 2. Patient will demonstrate increased [facility] of cervical/thoracic spine and good LS mobility, good hip ROM to allow for proper joint functioning as indicated by patients Functional Deficits. []? Progressing: []? Met: []? Not Met: []? Adjusted  3. Patient will demonstrate an increase in postural awareness and control and activation of deep cervical stabilizers to allow for proper functional mobility as indicated by patients Functional Deficits. []? Progressing: []? Met: []? Not Met: []? Adjusted  4. Patient will demonstrate an increase in Strength to good proximal hip and core activation to allow for proper functional mobility as indicated by patients Functional Deficits. []? Progressing: []? Met: []? Not Met: []? Adjusted  5. Patient will return to functional activities including RTW, lifting  without increased symptoms or restriction. []? Progressing: []? Met: []? Not Met: []? Adjusted  Overall Progression Towards Functional goals/ Treatment Progress Update:  [] Patient is progressing as expected towards functional goals listed. [] Progression is slowed due to complexities/Impairments listed. [] Progression has been slowed due to co-morbidities.   [x] Plan just implemented, too soon to assess goals progression <30days   [] Goals require adjustment due to lack of progress  [] Patient is not progressing as expected and requires additional follow up with physician  [] Other    Persisting Functional Limitations/Impairments:  []Sleeping [x]Sitting               [x]Standing []Transfers        [x]Walking [x]Kneeling               [x]Stairs [x]Squatting / bending   [x]ADLs [x]Reaching  [x]Lifting  []Housework  [x]Driving [x]Job related tasks  []Sports/Recreation []Other:        ASSESSMENT:  See eval  Treatment/Activity Tolerance:  [x] Patient able to complete tx [] Patient limited by fatigue  [x] Patient limited by pain  [] Patient limited by other medical complications  [] Other: Prognosis: [x] Good [] Fair  [] Poor    Patient Requires Follow-up: [x] Yes  [] No    Plan for next treatment session:see above, manual prn, mod prn, flexib and strength ex  PLAN: See eval. PT 3x / week for 3 weeks. [] Continue per plan of care [] Alter current plan (see comments)  [x] Plan of care initiated [] Hold pending MD visit [] Discharge    Electronically signed by: Lina Coates PT, [id number]    Note: If patient does not return for scheduled/ recommended follow up visits, this note will serve as a discharge from care along with most recent update on progress.

## 2021-02-15 NOTE — TELEPHONE ENCOUNTER
Pt states FMLA was denied citing information added by Dr. Barron Parent was not suffiecient. Pt will fax new forms to be completed.  Form due by 2/21/21

## 2021-02-16 NOTE — TELEPHONE ENCOUNTER
I have the blank FMLA forms.   Please print out the most recent FMLA paperwork I did in media so I can review

## 2021-02-17 ENCOUNTER — HOSPITAL ENCOUNTER (OUTPATIENT)
Dept: PHYSICAL THERAPY | Age: 56
Setting detail: THERAPIES SERIES
Discharge: HOME | End: 2021-02-17
Payer: OTHER GOVERNMENT

## 2021-02-17 PROCEDURE — 97140 MANUAL THERAPY 1/> REGIONS: CPT

## 2021-02-17 PROCEDURE — 97110 THERAPEUTIC EXERCISES: CPT

## 2021-02-19 ENCOUNTER — HOSPITAL ENCOUNTER (OUTPATIENT)
Dept: PHYSICAL THERAPY | Age: 56
Setting detail: THERAPIES SERIES
Discharge: HOME | End: 2021-02-19
Payer: OTHER GOVERNMENT

## 2021-02-19 PROCEDURE — 97110 THERAPEUTIC EXERCISES: CPT

## 2021-02-19 PROCEDURE — 97140 MANUAL THERAPY 1/> REGIONS: CPT

## 2021-02-19 PROCEDURE — G0283 ELEC STIM OTHER THAN WOUND: HCPCS

## 2021-02-19 NOTE — FLOWSHEET NOTE
End time:  9am Resistance / level Sets/sec Reps Notes   HEP performed and gone over see below       Stepper sci fit seat 20 , arms 7 Level 2.5 5min     swiss ball LB A/P, lat, CW, CCW     Head on ball or pillow cervical NPV      IB  HR/TR  1/30\"  2 2  10    Stairs hams    hip flexor stretch L/R  30\"  60\" 2 L/R  1 L/R    tband mid row, LPD, high row Blue    Pulley roman flex stretch   5 slow           Therapeutic Activities (20264)  Start time:  End time:                                   Neuromuscular Re-ed (38642)  Start time:  End time:                                                 Manual Intervention ([id number])  Start time: 9am  End time: 9:12am       Prone STM B paraspinals cervical, lumbar mm, esvin UT's, gentle manual quad stretches (R side tight from old knee injury), supine gentle manual stretches B hams, pirif, glut  12 min                                            Modalities: 2/19 pt request to do Estim and try MHP today, 9:12am-9:27am Premod estim 2 electrodes to cervical/UT, 2 to LB PSIS with MHP to both areas with LE on grey bolster  2/15 15 min start time 10:30-10:45am supine with grey bolster under LE's CP in pillow case to neck, CP with towel over to LB, premod estim to neck and low back, covered with warm blanket. Pt has been using CP and heating pad prn at home. Pt. Education:  -patient educated on diagnosis, prognosis and expectations for rehab  -all patient questions were answered    HEP instruction:  2/19: reviewed and performed all HEP, pt needing assist with some to do correctly, encouraged pt to do over weekend. HEP instruction: Written HEP instructions provided and reviewed  Access Code: [email or web address]   URL: [email or web address]   Date: 02/15/2021   Prepared by:  Read Sd     Exercises   · Supine Shoulder Flexion AAROM with Dowel - 5 reps - 2 sets - 1x daily - 7x weekly   · Seated Cervical Retraction - 5 reps - 2 sets - 1x daily - 7x weekly [] UE / cervical: cervical, scapular, scapulothoracic and UE control with self care, reaching, carrying, lifting, house/yardwork, driving, computer work. NMR and Therapeutic Activities:    [] (09343 or 00884) Provided verbal/tactile cueing for activities related to improving balance, coordination, kinesthetic sense, posture, motor skill, proprioception and motor activation to allow for proper function of   [] LE: / Lumbar core, proximal hip and LE with self care and ADLs  [] UE / Cervical: cervical, postural, scapular, scapulothoracic and UE control with self care, carrying, lifting, driving, computer work.   [] (06267) Gait Re-education- Provided training and instruction to the patient for proper LE, core and proximal hip recruitment and positioning and eccentric body weight control with ambulation re-education including up and down stairs     Home Management Training / Self Care:  [] (99995) Home Management Training / Self-care: ADLs and compensatory training, meal preparation, safety procedures and instruction in use of adaptive equipment, including bathing, grooming, dressing, personal hygiene, basic household cleaning and chores.      Home Exercise Program:    [x] (26290) Reviewed/Progressed HEP activities related to strengthening, flexibility, endurance, ROM of   [x] LE / Lumbar: core, proximal hip and LE for functional self-care, mobility, lifting and ambulation/stair navigation   [x] UE / Cervical: cervical, postural, scapular, scapulothoracic and UE control with self care, reaching, carrying, lifting, house/yardwork, driving, computer work  [] (65481)Reviewed/Progressed HEP activities related to improving balance, coordination, kinesthetic sense, posture, motor skill, proprioception of   [] LE: core, proximal hip and LE for self care, mobility, lifting, and ambulation/stair navigation [] UE / Cervical: cervical, postural,  scapular, scapulothoracic and UE control with self care, reaching, carrying, lifting, house/yardwork, driving, computer work    Manual Treatments:  PROM / STM / Oscillations-Mobs:  G-I, II, III, IV (PA's, Inf., Post.)  [x] (41399) Provided manual therapy to mobilize LE, proximal hip and/or LS spine soft tissue/joints for the purpose of modulating pain, promoting relaxation,  increasing ROM, reducing/eliminating soft tissue swelling/inflammation/restriction, improving soft tissue extensibility and allowing for proper ROM for normal function with   [x] LE / lumbar: self care, mobility, lifting and ambulation. [x] UE / Cervical: self care, reaching, carrying, lifting, house/yardwork, driving, computer work. Modalities:  [] (69647) Vasopneumatic compression: Utilized vasopneumatic compression to decrease edema / swelling for the purpose of improving mobility and quad tone / recruitment which will allow for increased overall function including but not limited to self-care, transfers, ambulation, and ascending / descending stairs. Charges:  Timed Code Treatment Minutes: 42   Total Treatment Minutes: 57     [] EVAL - LOW (22734)   [] EVAL - MOD (84499)  [] EVAL - HIGH (16752)  [] RE-EVAL (09432)  [x] II(71889) x 2     [] Ionto  [] NMR (75740) x       [] Vaso  [x] Manual (36463) x 1      [] Ultrasound  [] TA x        [] Mech Traction (95518)  [] Aquatic Therapy x     [x] ES (un) (72040):1   [] Home Management Training x  [] ES(attended) (50509)   [] Dry Needling 1-2 muscles (91914):  [] Dry Needling 3+ muscles (853138  [] Group:      [] Other:     GOALS:   Patient stated goal: No pain neck and low back for RTW  []? Progressing: []? Met: []? Not Met: []? Adjusted     Therapist goals for Patient:   Short Term Goals: To be achieved in: 2 weeks  1. Independent in HEP and progression per patient tolerance, in order to prevent re-injury. []? Progressing: []? Met: []? Not Met: []? Adjusted  2. Patient will have a decrease in pain to facilitate improvement in movement, function, and ADLs as indicated by Functional Deficits. []? Progressing: []? Met: []? Not Met: []? Adjusted     Long Term Goals: To be achieved in: 3 weeks  1. Disability index score of 10% or less for the NDI and/or LEONA to assist with reaching prior level of function. []? Progressing: []? Met: []? Not Met: []? Adjusted  2. Patient will demonstrate increased AROM to [facility] of cervical/thoracic spine and good LS mobility, good hip ROM to allow for proper joint functioning as indicated by patients Functional Deficits. []? Progressing: []? Met: []? Not Met: []? Adjusted  3. Patient will demonstrate an increase in postural awareness and control and activation of deep cervical stabilizers to allow for proper functional mobility as indicated by patients Functional Deficits. []? Progressing: []? Met: []? Not Met: []? Adjusted  4. Patient will demonstrate an increase in Strength to good proximal hip and core activation to allow for proper functional mobility as indicated by patients Functional Deficits. []? Progressing: []? Met: []? Not Met: []? Adjusted  5. Patient will return to functional activities including RTW, lifting  without increased symptoms or restriction. []? Progressing: []? Met: []? Not Met: []? Adjusted  Overall Progression Towards Functional goals/ Treatment Progress Update:  [] Patient is progressing as expected towards functional goals listed. [] Progression is slowed due to complexities/Impairments listed. [] Progression has been slowed due to co-morbidities.   [x] Plan just implemented, too soon to assess goals progression <30days   [] Goals require adjustment due to lack of progress  [] Patient is not progressing as expected and requires additional follow up with physician  [] Other    Persisting Functional Limitations/Impairments: []Sleeping [x]Sitting               [x]Standing []Transfers        [x]Walking [x]Kneeling               [x]Stairs [x]Squatting / bending   [x]ADLs [x]Reaching  [x]Lifting  []Housework  [x]Driving [x]Job related tasks  []Sports/Recreation []Other:        ASSESSMENT:  Patient will need reinforcement to perform his HEP. Reviewed with pt this date. Improved from eval but LBP worse than neck today. Patient will likely benefit from skilled PT to address his impairments and functional limitations,    Treatment/Activity Tolerance:  [x] Patient able to complete tx [] Patient limited by fatigue  [x] Patient limited by pain  [] Patient limited by other medical complications  [] Other:     Prognosis: [x] Good [] Fair  [] Poor    Patient Requires Follow-up: [x] Yes  [] No    Plan for next treatment session:see above, manual prn, mod prn, flexib and strength ex  PLAN: See eval. PT 3x / week for 3 weeks. [x] Continue per plan of care [] Alter current plan (see comments)  [] Plan of care initiated [] Hold pending MD visit [] Discharge    Electronically signed by: Leonardo Tipton PT, DPT #[id number]  Note: If patient does not return for scheduled/ recommended follow up visits, this note will serve as a discharge from care along with most recent update on progress.

## 2021-02-22 ENCOUNTER — HOSPITAL ENCOUNTER (OUTPATIENT)
Dept: PHYSICAL THERAPY | Age: 56
Setting detail: THERAPIES SERIES
Discharge: HOME | End: 2021-02-22
Payer: OTHER GOVERNMENT

## 2021-02-22 PROCEDURE — 97110 THERAPEUTIC EXERCISES: CPT

## 2021-02-22 PROCEDURE — G0283 ELEC STIM OTHER THAN WOUND: HCPCS

## 2021-02-22 PROCEDURE — 97140 MANUAL THERAPY 1/> REGIONS: CPT

## 2021-02-22 NOTE — FLOWSHEET NOTE
[facility]  Outpatient Physical Therapy  Phone: [phone]   Fax: [phone]    Physical Therapy Daily Treatment Note  Date:  2021    Patient Name:  Lizbet Giles    :  1965  MRN: [id number]  Medical/Treatment Diagnosis Information:  · Diagnosis: S39.012A strain of mm, fascia, tendons low back, S16. 1XXA strain of mm, fascia, tendons cervical region  · Treatment Diagnosis: Decreased flexibility and strength shoulder, neck, LB, LE, increased pain neck and low back. Insurance/Certification information:  PT Insurance Information:  approved 9 visits 2021-2021  Physician Information:  Referring Practitioner: Dr. Selby Divers of care signed (Y/N): [x]  Yes []  No Cosign     Date of Patient follow up with Physician: 21   Progress Report: []  Yes  [x]  No     Date Range for reporting period:  Beginning 2/15/21  Ending    Progress report due (10 Rx/or 30 days whichever is less): , send PN to Dr. Boom Chauhan weekly, visit #9      Recertification due (POC duration/ or 90 days whichever is less): visit #9, 21     Visit # Insurance Allowable Auth required? Date Range     Pt to come 2 more times before return to MD 9 till  [x]  Yes-approved  []  No 2021     Latex Allergy:  [x]NO      []YES  Preferred Language for Healthcare:   [x]English       []other:    Functional Scale:       Date assessed:2/15/2021   NDI 23 Oswestry 23: raw score ; dysfunction = NDI 46%, Oswestry 51%    Pain level:  1-2/10 Neck  and  2-3/10 low back    SUBJECTIVE:  . Pt to return  to MD. Off work till then. Pt reports pain worse in LB, neck feeling better, mainly feels LBP with bending over and twisting.       OBJECTIVE: See eval      RESTRICTIONS/PRECAUTIONS: HTN, DM, gastric sleeve, hx R knee injury     Exercises/Interventions:     Therapeutic Exercises (97833)  Start [id number] am  End time: 1035 am Resistance / level Sets/sec Reps Notes HEP performed and gone over see below       Stepper sci fit seat 20 , arms 7 Level 2.5 6min     swiss ball LB A/P, lat, CW, CCW     Head on ball or pillow cervical NPV      IB  HR/TR  1/30\"  2 2  10    Stairs hams    hip flexor stretch L/R  30\"  60\" 2 L/R  1 L/R    tband mid row, LPD, high row Blue    Pulley roman flex stretch   5 slow    Standing doorway bicep stretch  10\" 5 2/22   Standing Doorway pec stretch   15\" 3 B 2/22   Wall slides Chin tuck 1 10 2/22   Forearm wall slides Chin tuck 1 10 2/22          Therapeutic Activities (72892)  Start time:  End time:                                   Neuromuscular Re-ed (52213)  Start time:  End time:                                                 Manual Intervention (20690)  Start time: 1036am  End time: 1047am       Prone STM B paraspinals cervical, lumbar mm, esvin UT's, gentle manual quad stretches (R side tight from old knee injury), supine gentle manual stretches B hams, pirif, glut  9 min     MET to correct T2 ERSL, T3 FRSL seated followed with gentle seATED UT and Levator strteches on L   2' 2/22                                    Modalities: 2/22, 2/19 pt request to do Estim and try MHP today, 9:12am-9:27am Premod estim 2 electrodes to cervical/UT, 2 to LB PSIS with MHP to both areas with LE on grey bolster  2/15 15 min start time 10:30-10:45am supine with grey bolster under LE's CP in pillow case to neck, CP with towel over to LB, premod estim to neck and low back, covered with warm blanket. Pt has been using CP and heating pad prn at home. Pt. Education:  -patient educated on diagnosis, prognosis and expectations for rehab  -all patient questions were answered    HEP instruction:  2/19: reviewed and performed all HEP, pt needing assist with some to do correctly, encouraged pt to do over weekend. HEP instruction: Written HEP instructions provided and reviewed  Access Code: [email or web address]   URL: [email or web address]   Date: 02/15/2021 Prepared by: Ana Sears     Exercises   · Supine Shoulder Flexion AAROM with Dowel - 5 reps - 2 sets - 1x daily - 7x weekly   · Seated Cervical Retraction - 5 reps - 2 sets - 1x daily - 7x weekly   · Seated Cervical Rotation AROM - 5 reps - 2 sets - 1x daily - 7x weekly   · Seated Cervical Retraction and Extension - 5 reps - 2 sets - 1x daily - 7x weekly   · Seated Cervical Sidebending AROM - 5 reps - 2 sets - 1x daily - 7x weekly   · Seated Scapular Retraction - 10 reps - 2 sets - 1x daily - 7x weekly   · Supine Posterior Pelvic Tilt - 10 reps - 2 sets - 1x daily - 7x weekly   · Supine Lower Trunk Rotation - 10 reps - 2 sets - 1x daily - 7x weekly   · Supine Single Knee to Chest Stretch - 4 reps - 2 sets - 15 hold - 1x daily - 7x weekly   · Prone Press Up on Elbows - 4 reps - 2 sets - 1x daily - 7x weekly   · Prone Knee Flexion - 5 reps - 2 sets - 1x daily - 7x weekly     Therapeutic Exercise and NMR EXR  [x] (82591) Provided verbal/tactile cueing for activities related to strengthening, flexibility, endurance, ROM for improvements in  [x] LE / Lumbar: LE, proximal hip, and core control with self care, mobility, lifting, ambulation. [x] UE / Cervical: cervical, postural, scapular, scapulothoracic and UE control with self care, reaching, carrying, lifting, house/yardwork, driving, computer work.  [] (86968) Provided verbal/tactile cueing for activities related to improving balance, coordination, kinesthetic sense, posture, motor skill, proprioception to assist with   [] LE / lumbar: LE, proximal hip, and core control in self care, mobility, lifting, ambulation and eccentric single leg control. [] UE / cervical: cervical, scapular, scapulothoracic and UE control with self care, reaching, carrying, lifting, house/yardwork, driving, computer work. [] (78079) Therapist is in constant attendance of 2 or more patients providing skilled therapy interventions, but not providing any significant amount of measurable one-on-one time to either patient, for improvements in  [] LE / lumbar: LE, proximal hip, and core control in self care, mobility, lifting, ambulation and eccentric single leg control. [] UE / cervical: cervical, scapular, scapulothoracic and UE control with self care, reaching, carrying, lifting, house/yardwork, driving, computer work. NMR and Therapeutic Activities:    [] (17915 or 73790) Provided verbal/tactile cueing for activities related to improving balance, coordination, kinesthetic sense, posture, motor skill, proprioception and motor activation to allow for proper function of   [] LE: / Lumbar core, proximal hip and LE with self care and ADLs  [] UE / Cervical: cervical, postural, scapular, scapulothoracic and UE control with self care, carrying, lifting, driving, computer work.   [] (83360) Gait Re-education- Provided training and instruction to the patient for proper LE, core and proximal hip recruitment and positioning and eccentric body weight control with ambulation re-education including up and down stairs     Home Management Training / Self Care:  [] (39419) Home Management Training / Self-care: ADLs and compensatory training, meal preparation, safety procedures and instruction in use of adaptive equipment, including bathing, grooming, dressing, personal hygiene, basic household cleaning and chores.      Home Exercise Program:    [x] (90089) Reviewed/Progressed HEP activities related to strengthening, flexibility, endurance, ROM of   [x] LE / Lumbar: core, proximal hip and LE for functional self-care, mobility, lifting and ambulation/stair navigation   [x] UE / Cervical: cervical, postural, scapular, scapulothoracic and UE control with self care, reaching, carrying, lifting, house/yardwork, driving, computer work [] (39259)Reviewed/Progressed HEP activities related to improving balance, coordination, kinesthetic sense, posture, motor skill, proprioception of   [] LE: core, proximal hip and LE for self care, mobility, lifting, and ambulation/stair navigation    [] UE / Cervical: cervical, postural,  scapular, scapulothoracic and UE control with self care, reaching, carrying, lifting, house/yardwork, driving, computer work    Manual Treatments:  PROM / STM / Oscillations-Mobs:  G-I, II, III, IV (PA's, Inf., Post.)  [x] (39745) Provided manual therapy to mobilize LE, proximal hip and/or LS spine soft tissue/joints for the purpose of modulating pain, promoting relaxation,  increasing ROM, reducing/eliminating soft tissue swelling/inflammation/restriction, improving soft tissue extensibility and allowing for proper ROM for normal function with   [x] LE / lumbar: self care, mobility, lifting and ambulation. [x] UE / Cervical: self care, reaching, carrying, lifting, house/yardwork, driving, computer work. Modalities:  [x] (75340) Vasopneumatic compression: Utilized vasopneumatic compression to decrease edema / swelling for the purpose of improving mobility and quad tone / recruitment which will allow for increased overall function including but not limited to self-care, transfers, ambulation, and ascending / descending stairs.      Charges:  Timed Code Treatment Minutes: 43   Total Treatment Minutes: 57     [] EVAL - LOW (69478)   [] EVAL - MOD (48013)  [] EVAL - HIGH (76865)  [] RE-EVAL (35120)  [x] SAMPSON(15979) x 2     [] Ionto  [] NMR (55705) x       [] Vaso  [x] Manual (78205) x 1      [] Ultrasound  [] TA x        [] Mech Traction (99230)  [] Aquatic Therapy x     [x] ES (un) (46432):1   [] Home Management Training x  [] ES(attended) (86671)   [] Dry Needling 1-2 muscles (20301):  [] Dry Needling 3+ muscles (760066  [] Group:      [] Other:     GOALS:   Patient stated goal: No pain neck and low back for RTW [] Goals require adjustment due to lack of progress  [] Patient is not progressing as expected and requires additional follow up with physician  [] Other    Persisting Functional Limitations/Impairments:  []Sleeping [x]Sitting               [x]Standing []Transfers        [x]Walking [x]Kneeling               [x]Stairs [x]Squatting / bending   [x]ADLs [x]Reaching  [x]Lifting  []Housework  [x]Driving [x]Job related tasks  []Sports/Recreation []Other:        ASSESSMENT:  Patient continues to need reinforcement to perform his HEP. Patient still has some myofascial bands in B lumbosacral ps with limiations in stabilization in hookline and standing positions. Patient will likely benefit from skilled PT to address his impairments and functional limitations,    Treatment/Activity Tolerance:  [x] Patient able to complete tx [] Patient limited by fatigue  [x] Patient limited by pain  [] Patient limited by other medical complications  [] Other:     Prognosis: [x] Good [] Fair  [] Poor    Patient Requires Follow-up: [x] Yes  [] No    Plan for next treatment session:see above, manual prn, mod prn, flexib and strength ex  PLAN: See eval. PT 3x / week for 3 weeks. [x] Continue per plan of care [] Alter current plan (see comments)  [] Plan of care initiated [] Hold pending MD visit [] Discharge    Electronically signed by: Veronika Champagne PT, DPT #[id number]  Note: If patient does not return for scheduled/ recommended follow up visits, this note will serve as a discharge from care along with most recent update on progress.

## 2021-02-24 ENCOUNTER — HOSPITAL ENCOUNTER (OUTPATIENT)
Dept: PHYSICAL THERAPY | Age: 56
Setting detail: THERAPIES SERIES
Discharge: HOME | End: 2021-02-24
Payer: OTHER GOVERNMENT

## 2021-02-24 PROCEDURE — G0283 ELEC STIM OTHER THAN WOUND: HCPCS

## 2021-02-24 PROCEDURE — 97140 MANUAL THERAPY 1/> REGIONS: CPT

## 2021-02-24 PROCEDURE — 97530 THERAPEUTIC ACTIVITIES: CPT

## 2021-02-24 PROCEDURE — 97110 THERAPEUTIC EXERCISES: CPT

## 2021-02-24 NOTE — PROGRESS NOTES
[facility]  Outpatient Physical Therapy  Phone: [phone]   Fax: [phone]   Physical Therapy Re-Certification Plan of Care    Dear  Dr. Mariya Hills  We had the pleasure of treating the following patient for physical therapy services at [facility]. A summary of our findings can be found in the updated assessment below. This includes our plan of care. If you have any questions or concerns regarding these findings, please do not hesitate to contact me at the office phone number checked above. Thank you for the referral.     Physician Signature:________________________________Date:__________________  By signing above (or electronic signature), therapists plan is approved by physician      Functional Outcome: NDI 8; 16%; LEONA 16; 35.56%    Overall Response to Treatment:   [x]Patient is responding well to treatment and improvement is noted with regards  to goals   []Patient should continue to improve in reasonable time if they continue HEP   []Patient has plateaued and is no longer responding to skilled PT intervention    []Patient is getting worse and would benefit from return to referring MD   []Patient unable to adhere to initial POC   []Other:     Date range of Visits: Eval-2021  Total Visits: 5    Recommendation:     [x]Continue PT   []Hold PT, pending MD visit    Physical Therapy Daily Treatment Note  Date:  2021    Patient Name:  Valerie Francis    :  1965  MRN: [id number]  Medical/Treatment Diagnosis Information:  · Diagnosis: S39.012A strain of mm, fascia, tendons low back, S16. 1XXA strain of mm, fascia, tendons cervical region  · Treatment Diagnosis: Decreased flexibility and strength shoulder, neck, LB, LE, increased pain neck and low back.   Insurance/Certification information:  PT Insurance Information: WC approved 9 visits 2021-2021 Physician Information:  Referring Practitioner: Dr. Chantal Gutierrez of care signed (Y/N): [x]  Yes []  No Cosign     Date of Patient follow up with Physician: 21   Progress Report: [x]  Yes  []  No     Date Range for reporting period:  Beginning 2/15/21  Endin/24    Progress report due (10 Rx/or 30 days whichever is less): , send PN to Dr. Winsome Rai weekly, visit #9      Recertification due (POC duration/ or 90 days whichever is less): visit #9, 21     Visit # Insurance Allowable Auth required? Date Range    till  [x]  Yes-approved  []  No 2021     Latex Allergy:  [x]NO      []YES  Preferred Language for Healthcare:   [x]English       []other:    Functional Scale:       Date assessed:2/15/2021   NDI 23 Oswestry 23: raw score ; dysfunction = NDI 46%, Oswestry 51%  NDI 8 Oswestry 16: raw score ; dysfunction = NDI 16%, [id number]% Assessed on 2021      Pain level:  1-2/10 Neck  and  2-3/10 low back    SUBJECTIVE:  Patient states overall he feels about 35% improved since starting PT. Ashley Patel Patient states his neck has gradually become less painful and his NDI improved from 46 to 16% disability. He reports only having infrquent slight HAs and is able to drive unlimited but with some neck pain. His Low back pain is at a higher intensity and he reports he is still limited in bending forward and lifting and has to be slow and cautious. He states his standing and sitting  tolerance is 1 hour due to back pain . He reports only being able to walk up to a 1/4 mile due to LBP. He did improve on his LEONA from 51% to 35.56%.       OBJECTIVE: : PNCervical; AROM: Chin to chest, rotation l 72 (pain on L ) , R 82; Upper quaretr strength: rhomboids R 5/5, L 4-/5, infraspinatus R 5/5, L 4/5, serratus anterior L 4/5, R 4-/5, GH Flexion R 4/5 Pain, L 5/5 , Posture: reproduces pain with Combined SB/ROT/ext; Lumbar Spine L pubic upslip, L5 NRLSR./ERSL, L4 FRSL, LOL sacral torsion RESTRICTIONS/PRECAUTIONS: HTN, DM, gastric sleeve, hx R knee injury 2012    Exercises/Interventions:     Therapeutic Exercises (68088)  Start time 0901am  End time: 0921 am Resistance / level Sets/sec Reps Notes   HEP performed and gone over see below       Stepper sci fit seat 20 , arms 7 Level 2.5 6min     swiss ball LB A/P, lat, CW, CCW     Head on ball or pillow cervical NPV      IB  HR/TR  1/30\"  2 2  10    Stairs hams    hip flexor stretch L/R  30\"  60\" 2 L/R  1 L/R    tband mid row, LPD, high row Blue    Pulley roman flex stretch   5 slow    Standing doorway bicep stretch  10\" 5 2/22   Standing Doorway pec stretch   15\" 3 B 2/22   Wall slides 2/22   Forearm wall slides 2/22   Swiss Ball Deep knee to chesr  REd 1 10 2/24   Bent knee fall outs                             Therapeutic Activities (39011)  Start time:835  End time:845       Reassessed patient' s strength and ROM, discussed goals and PT POC, and progress thus far X10'   2/24                        Neuromuscular Re-ed (47375)  Start time:  End time:                                                 Manual Intervention ([id number])  Start time: 0846am  End time: 0900am       Prone STM B paraspinals cervical, lumbar mm, esvin UT's, gentle manual quad stretches (R side tight from old knee injury), supine gentle manual stretches B hams, pirif, glut  9 min     MET to correct T2 ERSL, T3 FRSL seated followed with gentle seATED UT and Levator strteches on L   2' 2/22                                    Modalities: 2/22, 2/19 pt request to do Estim and try MHP today, 9:12am-9:27am Premod estim 2 electrodes to cervical/UT, 2 to LB PSIS with MHP to both areas with LE on grey bolster  2/15 15 min start time 10:30-10:45am supine with grey bolster under LE's CP in pillow case to neck, CP with towel over to LB, premod estim to neck and low back, covered with warm blanket. Pt has been using CP and heating pad prn at home.     Pt. Education: -patient educated on diagnosis, prognosis and expectations for rehab  -all patient questions were answered    HEP instruction:  2/19: reviewed and performed all HEP, pt needing assist with some to do correctly, encouraged pt to do over weekend. HEP instruction: Written HEP instructions provided and reviewed  Access Code: [email or web address]   URL: [email or web address]   Date: 02/15/2021   Prepared by: J Luis Amaya     Exercises   · Supine Shoulder Flexion AAROM with Dowel - 5 reps - 2 sets - 1x daily - 7x weekly   · Seated Cervical Retraction - 5 reps - 2 sets - 1x daily - 7x weekly   · Seated Cervical Rotation AROM - 5 reps - 2 sets - 1x daily - 7x weekly   · Seated Cervical Retraction and Extension - 5 reps - 2 sets - 1x daily - 7x weekly   · Seated Cervical Sidebending AROM - 5 reps - 2 sets - 1x daily - 7x weekly   · Seated Scapular Retraction - 10 reps - 2 sets - 1x daily - 7x weekly   · Supine Posterior Pelvic Tilt - 10 reps - 2 sets - 1x daily - 7x weekly   · Supine Lower Trunk Rotation - 10 reps - 2 sets - 1x daily - 7x weekly   · Supine Single Knee to Chest Stretch - 4 reps - 2 sets - 15 hold - 1x daily - 7x weekly   · Prone Press Up on Elbows - 4 reps - 2 sets - 1x daily - 7x weekly   · Prone Knee Flexion - 5 reps - 2 sets - 1x daily - 7x weekly     Therapeutic Exercise and NMR EXR  [x] (03445) Provided verbal/tactile cueing for activities related to strengthening, flexibility, endurance, ROM for improvements in  [x] LE / Lumbar: LE, proximal hip, and core control with self care, mobility, lifting, ambulation.   [x] UE / Cervical: cervical, postural, scapular, scapulothoracic and UE control with self care, reaching, carrying, lifting, house/yardwork, driving, computer work.  [] (48994) Provided verbal/tactile cueing for activities related to improving balance, coordination, kinesthetic sense, posture, motor skill, proprioception to assist with [] LE / lumbar: LE, proximal hip, and core control in self care, mobility, lifting, ambulation and eccentric single leg control. [] UE / cervical: cervical, scapular, scapulothoracic and UE control with self care, reaching, carrying, lifting, house/yardwork, driving, computer work.   [] (45965) Therapist is in constant attendance of 2 or more patients providing skilled therapy interventions, but not providing any significant amount of measurable one-on-one time to either patient, for improvements in  [] LE / lumbar: LE, proximal hip, and core control in self care, mobility, lifting, ambulation and eccentric single leg control. [] UE / cervical: cervical, scapular, scapulothoracic and UE control with self care, reaching, carrying, lifting, house/yardwork, driving, computer work. NMR and Therapeutic Activities:    [] (10232 or 37779) Provided verbal/tactile cueing for activities related to improving balance, coordination, kinesthetic sense, posture, motor skill, proprioception and motor activation to allow for proper function of   [] LE: / Lumbar core, proximal hip and LE with self care and ADLs  [] UE / Cervical: cervical, postural, scapular, scapulothoracic and UE control with self care, carrying, lifting, driving, computer work.   [] (18707) Gait Re-education- Provided training and instruction to the patient for proper LE, core and proximal hip recruitment and positioning and eccentric body weight control with ambulation re-education including up and down stairs     Home Management Training / Self Care:  [] (78159) Home Management Training / Self-care: ADLs and compensatory training, meal preparation, safety procedures and instruction in use of adaptive equipment, including bathing, grooming, dressing, personal hygiene, basic household cleaning and chores.      Home Exercise Program:    [x] (22057) Reviewed/Progressed HEP activities related to strengthening, flexibility, endurance, ROM of [x] LE / Lumbar: core, proximal hip and LE for functional self-care, mobility, lifting and ambulation/stair navigation   [x] UE / Cervical: cervical, postural, scapular, scapulothoracic and UE control with self care, reaching, carrying, lifting, house/yardwork, driving, computer work  [] (80742)Reviewed/Progressed HEP activities related to improving balance, coordination, kinesthetic sense, posture, motor skill, proprioception of   [] LE: core, proximal hip and LE for self care, mobility, lifting, and ambulation/stair navigation    [] UE / Cervical: cervical, postural,  scapular, scapulothoracic and UE control with self care, reaching, carrying, lifting, house/yardwork, driving, computer work    Manual Treatments:  PROM / STM / Oscillations-Mobs:  G-I, II, III, IV (PA's, Inf., Post.)  [x] (05403) Provided manual therapy to mobilize LE, proximal hip and/or LS spine soft tissue/joints for the purpose of modulating pain, promoting relaxation,  increasing ROM, reducing/eliminating soft tissue swelling/inflammation/restriction, improving soft tissue extensibility and allowing for proper ROM for normal function with   [x] LE / lumbar: self care, mobility, lifting and ambulation. [x] UE / Cervical: self care, reaching, carrying, lifting, house/yardwork, driving, computer work. Modalities:  [x] (00222) Vasopneumatic compression: Utilized vasopneumatic compression to decrease edema / swelling for the purpose of improving mobility and quad tone / recruitment which will allow for increased overall function including but not limited to self-care, transfers, ambulation, and ascending / descending stairs.      Charges:  Timed Code Treatment Minutes: 45   Total Treatment Minutes: 60     [] EVAL - LOW (08780)   [] EVAL - MOD (27020)  [] EVAL - HIGH (22804)  [] RE-EVAL (54417)  [x] OK(43495) x 1     [] Ionto  [] NMR (48563) x       [] Vaso  [x] Manual (66628) x 1      [] Ultrasound  [x] TA x 1       [] [facility] (42257[facility] [] Aquatic Therapy x     [x] ES (un) (75564):1   [] Home Management Training x  [] ES(attended) (46486)   [] Dry Needling 1-2 muscles (36874):  [] Dry Needling 3+ muscles (220847  [] Group:      [] Other:     GOALS:   Patient stated goal: No pain neck and low back for RTW  []? Progressing: []? Met: []? Not Met: []? Adjusted     Therapist goals for Patient:   Short Term Goals: To be achieved in: 2 weeks  1. Independent in HEP and progression per patient tolerance, in order to prevent re-injury. []? Progressing: [x]? Met: []? Not Met: []? Adjusted  2. Patient will have a decrease in pain to facilitate improvement in movement, function, and ADLs as indicated by Functional Deficits. []? Progressing: [x]? Met: []? Not Met: []? Adjusted     Long Term Goals: To be achieved in: 3 weeks  1. Disability index score of 10% or less for the NDI and/or LEONA to assist with reaching prior level of function. []? Progressing: []? Met: [x]? Not Met: []? Adjusted  2. Patient will demonstrate increased AROM to [facility] of cervical/thoracic spine and good LS mobility, good hip ROM to allow for proper joint functioning as indicated by patients Functional Deficits. [x]? Progressing: []? Met: []? Not Met: []? Adjusted  3. Patient will demonstrate an increase in postural awareness and control and activation of deep cervical stabilizers to allow for proper functional mobility as indicated by patients Functional Deficits. [x]? Progressing: []? Met: []? Not Met: []? Adjusted  4. Patient will demonstrate an increase in Strength to good proximal hip and core activation to allow for proper functional mobility as indicated by patients Functional Deficits. [x]? Progressing: []? Met: []? Not Met: []? Adjusted  5. Patient will return to functional activities including RTW, lifting  without increased symptoms or restriction. []? Progressing: []? Met: [x]? Not Met: []?  Adjusted Overall Progression Towards Functional goals/ Treatment Progress Update:  [x] Patient is progressing as expected towards functional goals listed. [] Progression is slowed due to complexities/Impairments listed. [] Progression has been slowed due to co-morbidities. [] Plan just implemented, too soon to assess goals progression <30days   [] Goals require adjustment due to lack of progress  [] Patient is not progressing as expected and requires additional follow up with physician  [] Other    Persisting Functional Limitations/Impairments:  []Sleeping [x]Sitting               [x]Standing []Transfers        [x]Walking [x]Kneeling               [x]Stairs [x]Squatting / bending   [x]ADLs [x]Reaching  [x]Lifting  []Housework  [x]Driving [x]Job related tasks  []Sports/Recreation []Other:        ASSESSMENT:  Patient has limitations in bending forward at reassessment this date with L pubic upslip and L5 shfit as lisrtd above. Lesle Gulling He has C-T limitations causing mild stiffness at end range. Patient still has some myofascial bands in B lumbosacral ps with limiations in stabilization in hookline and standing positions. PATIENT'S CORE strength is overall fair with difficulty activatong muscles in hookline low level. Patient will likely benefit from continued skilled PT to address his impairments and functional limitations,    Treatment/Activity Tolerance:  [x] Patient able to complete tx [] Patient limited by fatigue  [x] Patient limited by pain  [] Patient limited by other medical complications  [] Other:     Prognosis: [x] Good [] Fair  [] Poor    Patient Requires Follow-up: [x] Yes  [] No    Plan for next treatment session:see above, manual prn, mod prn, flexib and strength ex  PLAN: See eval. PT 3x / week for 3 weeks.    [x] Continue per plan of care [] Alter current plan (see comments)  [] Plan of care initiated [] Hold pending MD visit [] Discharge    Electronically signed by: Noelle Tierney [address] [facility], [address] #[id number]

## 2021-02-26 ENCOUNTER — HOSPITAL ENCOUNTER (OUTPATIENT)
Dept: PHYSICAL THERAPY | Age: 56
Setting detail: THERAPIES SERIES
Discharge: HOME | End: 2021-02-26
Payer: OTHER GOVERNMENT

## 2021-02-26 PROCEDURE — 97140 MANUAL THERAPY 1/> REGIONS: CPT

## 2021-02-26 PROCEDURE — G0283 ELEC STIM OTHER THAN WOUND: HCPCS

## 2021-02-26 PROCEDURE — 97110 THERAPEUTIC EXERCISES: CPT

## 2021-02-26 NOTE — PROGRESS NOTES
[facility]  Outpatient Physical Therapy  Phone: [phone]   Fax: [phone]   Physical Therapy Re-Certification Plan of Care    Dear  Dr. Margie Murrieta  We had the pleasure of treating the following patient for physical therapy services at [facility]. A summary of our findings can be found in the updated assessment below. This includes our plan of care. If you have any questions or concerns regarding these findings, please do not hesitate to contact me at the office phone number checked above. Thank you for the referral.     Physician Signature:________________________________Date:__________________  By signing above (or electronic signature), therapists plan is approved by physician      Functional Outcome: NDI 8; 16%; LEONA 16; 35.56%    Overall Response to Treatment:   [x]Patient is responding well to treatment and improvement is noted with regards  to goals   []Patient should continue to improve in reasonable time if they continue HEP   []Patient has plateaued and is no longer responding to skilled PT intervention    []Patient is getting worse and would benefit from return to referring MD   []Patient unable to adhere to initial POC   []Other:     Date range of Visits: Eval-2021  Total Visits: 5    Recommendation:     [x]Continue PT   []Hold PT, pending MD visit    Physical Therapy Daily Treatment Note  Date:  2021    Patient Name:  Efrain Rojas    :  1965  MRN: [id number]  Medical/Treatment Diagnosis Information:  · Diagnosis: S39.012A strain of mm, fascia, tendons low back, S16. 1XXA strain of mm, fascia, tendons cervical region  · Treatment Diagnosis: Decreased flexibility and strength shoulder, neck, LB, LE, increased pain neck and low back.   Insurance/Certification information:  PT Insurance Information: WC approved 9 visits 2021-2021 Physician Information:  Referring Practitioner: Dr. Jeannie Matos of care signed (Y/N): [x]  Yes []  No Cosign , and PN     Date of Patient follow up with Physician: 21   Progress Report: []  Yes  [x]  No     Date Range for reporting period:  Beginning 2/15/21  Endin/24    Progress report due (10 Rx/or 30 days whichever is less): WC, send PN to Dr. Valerie Davison weekly, visit #9      Recertification due (POC duration/ or 90 days whichever is less): visit #9, 21     Visit # Insurance Allowable Auth required? Date Range     Find out next MD appt? PN   9 till  [x]  Yes-approved  []  No 2021     Latex Allergy:  [x]NO      []YES  Preferred Language for Healthcare:   [x]English       []other:    Functional Scale:       Date assessed:2/15/2021   NDI 23 Oswestry 23: raw score ; dysfunction = NDI 46%, Oswestry 51%  NDI 8 Oswestry 16: raw score ; dysfunction = NDI 16%, Uqyfero53.561% Assessed on 2021      Pain level:  4/10 Neck  and  5/10 low back    SUBJECTIVE:  Pt working half days, restrictions with no lifting/delivering anything over 5#. Today first day back. Pt works tomorrow, then off . : Patient states overall he feels about 35% improved since starting PT. Lysbeth Carrel Patient states his neck has gradually become less painful and his NDI improved from 46 to 16% disability. He reports only having infrquent slight HAs and is able to drive unlimited but with some neck pain. His Low back pain is at a higher intensity and he reports he is still limited in bending forward and lifting and has to be slow and cautious. He states his standing and sitting  tolerance is 1 hour due to back pain . He reports only being able to walk up to a 1/4 mile due to LBP. He did improve on his LEONA from 51% to 35.56%.       OBJECTIVE: : swelling min/mod L>R lower leg, per pt this happens 2/24: PNCervical; AROM: Chin to chest, rotation l 72 (pain on L ) , R 82; Upper quarter strength: rhomboids R 5/5, L 4-/5, infraspinatus R 5/5, L 4/5, serratus anterior L 4/5, R 4-/5, GH Flexion R 4/5 Pain, L 5/5 , Posture: reproduces pain with Combined SB/ROT/ext; Lumbar Spine L pubic upslip, L5 NRLSR./ERSL, L4 FRSL, LOL sacral torsion  RESTRICTIONS/PRECAUTIONS: HTN, DM, gastric sleeve, hx R knee injury 2012    Exercises/Interventions:     Therapeutic Exercises (11146)  Start time 3:10pm  End time: 3:35pm Resistance / level Sets/sec Reps Notes   HEP performed and gone over see below       Stepper sci fit seat 20 , arms 7 Level 2.5 6 min     swiss ball LB A/P, lat, CW, CCW green 1 10ea    Head on ball or pillow cervical NPV      IB  HR/TR  1/30\"  2 2  10    Stairs hams    hip flexor stretch L/R  30\"  60\" 2 L/R  1 L/R    tband mid row, LPD, high row Blue 2 10    Pulley roman flex stretch    UBE      1'F/1B=2' 10 slow    Standing doorway bicep stretch  10\" 3 B 2/22   Standing Doorway pec stretch   15\" 3 B 2/22   Wall slides 2/22   Forearm wall slides 2/22   Swiss Ball Deep knee to chest  Red 1 10 2/24   Bent knee fall outs  L/R  1 10 2/26                        Therapeutic Activities (07471)  Start time:  End time:       Reassessed patient' s strength and ROM, discussed goals and PT POC, and progress thus far    2/24                        Neuromuscular Re-ed (11000)  Start time:  End time:                                                 Manual Intervention ([id number] [facility])  Start time: 3:35pm  End time: 3:55pm       Prone STM B paraspinals cervical, thoracic, lumbar mm,  gentle manual quad stretches (R side tight from old knee injury), supine gentle manual stretches B hams, pirif, glut  20 min        2/22 Modalities: 2/26, 2/22, 2/19 pt request to do Estim and MHP today, start time 3:55-4:10pm Premod estim 2 electrodes to cervical/UT, 2 to Lumbar paraspinals with MHP to both areas with LE on grey bolster  2/15 15 min start time 10:30-10:45am supine with grey bolster under LE's CP in pillow case to neck, CP with towel over to LB, premod estim to neck and low back, covered with warm blanket. Pt has been using CP and heating pad prn at home. Pt. Education:  -patient educated on diagnosis, prognosis and expectations for rehab  -all patient questions were answered    HEP instruction:  2/19: reviewed and performed all HEP, pt needing assist with some to do correctly, encouraged pt to do over weekend. HEP instruction: Written HEP instructions provided and reviewed  Access Code: [email or web address]   URL: [email or web address]   Date: 02/15/2021   Prepared by:  Candace Walden     Exercises   · Supine Shoulder Flexion AAROM with Dowel - 5 reps - 2 sets - 1x daily - 7x weekly   · Seated Cervical Retraction - 5 reps - 2 sets - 1x daily - 7x weekly   · Seated Cervical Rotation AROM - 5 reps - 2 sets - 1x daily - 7x weekly   · Seated Cervical Retraction and Extension - 5 reps - 2 sets - 1x daily - 7x weekly   · Seated Cervical Sidebending AROM - 5 reps - 2 sets - 1x daily - 7x weekly   · Seated Scapular Retraction - 10 reps - 2 sets - 1x daily - 7x weekly   · Supine Posterior Pelvic Tilt - 10 reps - 2 sets - 1x daily - 7x weekly   · Supine Lower Trunk Rotation - 10 reps - 2 sets - 1x daily - 7x weekly   · Supine Single Knee to Chest Stretch - 4 reps - 2 sets - 15 hold - 1x daily - 7x weekly   · Prone Press Up on Elbows - 4 reps - 2 sets - 1x daily - 7x weekly   · Prone Knee Flexion - 5 reps - 2 sets - 1x daily - 7x weekly     Therapeutic Exercise and NMR EXR  [x] (55439) Provided verbal/tactile cueing for activities related to strengthening, flexibility, endurance, ROM for improvements in [x] LE / Lumbar: LE, proximal hip, and core control with self care, mobility, lifting, ambulation. [x] UE / Cervical: cervical, postural, scapular, scapulothoracic and UE control with self care, reaching, carrying, lifting, house/yardwork, driving, computer work.  [] (72842) Provided verbal/tactile cueing for activities related to improving balance, coordination, kinesthetic sense, posture, motor skill, proprioception to assist with   [] LE / lumbar: LE, proximal hip, and core control in self care, mobility, lifting, ambulation and eccentric single leg control. [] UE / cervical: cervical, scapular, scapulothoracic and UE control with self care, reaching, carrying, lifting, house/yardwork, driving, computer work.   [] (53707) Therapist is in constant attendance of 2 or more patients providing skilled therapy interventions, but not providing any significant amount of measurable one-on-one time to either patient, for improvements in  [] LE / lumbar: LE, proximal hip, and core control in self care, mobility, lifting, ambulation and eccentric single leg control. [] UE / cervical: cervical, scapular, scapulothoracic and UE control with self care, reaching, carrying, lifting, house/yardwork, driving, computer work.      NMR and Therapeutic Activities:    [] (39079 or 59404) Provided verbal/tactile cueing for activities related to improving balance, coordination, kinesthetic sense, posture, motor skill, proprioception and motor activation to allow for proper function of   [] LE: / Lumbar core, proximal hip and LE with self care and ADLs  [] UE / Cervical: cervical, postural, scapular, scapulothoracic and UE control with self care, carrying, lifting, driving, computer work.   [] (30144) Gait Re-education- Provided training and instruction to the patient for proper LE, core and proximal hip recruitment and positioning and eccentric body weight control with ambulation re-education including up and down stairs Home Management Training / Self Care:  [] (76875) Home Management Training / Self-care: ADLs and compensatory training, meal preparation, safety procedures and instruction in use of adaptive equipment, including bathing, grooming, dressing, personal hygiene, basic household cleaning and chores. Home Exercise Program:    [x] (24006) Reviewed/Progressed HEP activities related to strengthening, flexibility, endurance, ROM of   [x] LE / Lumbar: core, proximal hip and LE for functional self-care, mobility, lifting and ambulation/stair navigation   [x] UE / Cervical: cervical, postural, scapular, scapulothoracic and UE control with self care, reaching, carrying, lifting, house/yardwork, driving, computer work  [] (08689)Reviewed/Progressed HEP activities related to improving balance, coordination, kinesthetic sense, posture, motor skill, proprioception of   [] LE: core, proximal hip and LE for self care, mobility, lifting, and ambulation/stair navigation    [] UE / Cervical: cervical, postural,  scapular, scapulothoracic and UE control with self care, reaching, carrying, lifting, house/yardwork, driving, computer work    Manual Treatments:  PROM / STM / Oscillations-Mobs:  G-I, II, III, IV (PA's, Inf., Post.)  [x] (86760) Provided manual therapy to mobilize LE, proximal hip and/or LS spine soft tissue/joints for the purpose of modulating pain, promoting relaxation,  increasing ROM, reducing/eliminating soft tissue swelling/inflammation/restriction, improving soft tissue extensibility and allowing for proper ROM for normal function with   [x] LE / lumbar: self care, mobility, lifting and ambulation. [x] UE / Cervical: self care, reaching, carrying, lifting, house/yardwork, driving, computer work.      Modalities: see above [] (57908) Vasopneumatic compression: Utilized vasopneumatic compression to decrease edema / swelling for the purpose of improving mobility and quad tone / recruitment which will allow for increased overall function including but not limited to self-care, transfers, ambulation, and ascending / descending stairs. Charges:  Timed Code Treatment Minutes: 50   Total Treatment Minutes: 65     [] EVAL - LOW (30020)   [] EVAL - MOD (61191)  [] EVAL - HIGH (03266)  [] RE-EVAL (92640)  [x] FL(39913) x 2     [] Ionto  [] NMR (94902) x       [] Vaso  [x] Manual (79103) x 1      [] Ultrasound  [] TA x        [] Mech Traction (51299)  [] Aquatic Therapy x     [x] ES (un) (73034):1   [] Home Management Training x  [] ES(attended) (14474)   [] Dry Needling 1-2 muscles (99900):  [] Dry Needling 3+ muscles (580523  [] Group:      [] Other:     GOALS:   Patient stated goal: No pain neck and low back for RTW  [x]? Progressing: []? Met: []? Not Met: []? Adjusted     Therapist goals for Patient:   Short Term Goals: To be achieved in: 2 weeks  1. Independent in HEP and progression per patient tolerance, in order to prevent re-injury. []? Progressing: [x]? Met: []? Not Met: []? Adjusted  2. Patient will have a decrease in pain to facilitate improvement in movement, function, and ADLs as indicated by Functional Deficits. []? Progressing: [x]? Met: []? Not Met: []? Adjusted     Long Term Goals: To be achieved in: 3 weeks  1. Disability index score of 10% or less for the NDI and/or LEONA to assist with reaching prior level of function. [x]? Progressing: []? Met: []? Not Met: []? Adjusted  2. Patient will demonstrate increased AROM to [facility] of cervical/thoracic spine and good LS mobility, good hip ROM to allow for proper joint functioning as indicated by patients Functional Deficits. [x]? Progressing: []? Met: []? Not Met: []?  Adjusted 3. Patient will demonstrate an increase in postural awareness and control and activation of deep cervical stabilizers to allow for proper functional mobility as indicated by patients Functional Deficits. [x]? Progressing: []? Met: []? Not Met: []? Adjusted  4. Patient will demonstrate an increase in Strength to good proximal hip and core activation to allow for proper functional mobility as indicated by patients Functional Deficits. [x]? Progressing: []? Met: []? Not Met: []? Adjusted  5. Patient will return to functional activities including RTW, lifting  without increased symptoms or restriction. [x]? Progressing: []? Met: []? Not Met: []? Adjusted  Overall Progression Towards Functional goals/ Treatment Progress Update:  [] Patient is progressing as expected towards functional goals listed. [x] Progression is slowed due to complexities/Impairments listed. [] Progression has been slowed due to co-morbidities. [] Plan just implemented, too soon to assess goals progression <30days   [] Goals require adjustment due to lack of progress  [] Patient is not progressing as expected and requires additional follow up with physician  [] Other    Persisting Functional Limitations/Impairments:  []Sleeping [x]Sitting               [x]Standing []Transfers        [x]Walking [x]Kneeling               [x]Stairs [x]Squatting / bending   [x]ADLs [x]Reaching  [x]Lifting  []Housework  [x]Driving [x]Job related tasks  []Sports/Recreation []Other:        ASSESSMENT:  Patient more sore today due to RTW this am. Pt working 1/2 days with 5# lifting restrictions. Pt con't with tightness upper thoracic/cervical and lumbar. Needs con't flexib, strength ex and manual to return to PLOF.   Treatment/Activity Tolerance:  [x] Patient able to complete tx [] Patient limited by fatigue  [x] Patient limited by pain  [] Patient limited by other medical complications  [] Other:     Prognosis: [x] Good [] Fair  [] Poor Patient Requires Follow-up: [x] Yes  [] No    Plan for next treatment session:see above, manual prn, mod prn, flexib and strength ex  PLAN: See eval. PT 3x / week for 3 weeks. [x] Continue per plan of care [] Alter current plan (see comments)  [] Plan of care initiated [] Hold pending MD visit [] Discharge    Electronically signed by: Damion Daniel PT, DPT [id number]  Note: If patient does not return for scheduled/ recommended follow up visits, this note will serve as a discharge from care along with most recent update on progress.

## 2021-03-01 ENCOUNTER — HOSPITAL ENCOUNTER (OUTPATIENT)
Dept: PHYSICAL THERAPY | Age: 56
Setting detail: THERAPIES SERIES
Discharge: HOME | End: 2021-03-01
Payer: OTHER GOVERNMENT

## 2021-03-01 DIAGNOSIS — E11.69 DIABETES MELLITUS TYPE 2 IN OBESE (HCC): ICD-10-CM

## 2021-03-01 DIAGNOSIS — E66.9 DIABETES MELLITUS TYPE 2 IN OBESE (HCC): ICD-10-CM

## 2021-03-01 PROCEDURE — G0283 ELEC STIM OTHER THAN WOUND: HCPCS

## 2021-03-01 PROCEDURE — 97110 THERAPEUTIC EXERCISES: CPT

## 2021-03-01 PROCEDURE — 97140 MANUAL THERAPY 1/> REGIONS: CPT

## 2021-03-01 RX ORDER — BLOOD SUGAR DIAGNOSTIC
STRIP MISCELLANEOUS
Qty: 100 EACH | Refills: 3 | Status: SHIPPED | OUTPATIENT
Start: 2021-03-01 | End: 2022-02-24

## 2021-03-01 NOTE — TELEPHONE ENCOUNTER
Medication:   Requested Prescriptions     Pending Prescriptions Disp Refills    ONETOUCH ULTRA strip [Pharmacy Med Name: Sherwin Delgadillo 100'S] 100 each 3     Sig: USE 1 STRIP  DAILY AS NEEDED       Last Filled:  3/6/20 #100, 3 RF     Patient Phone Number: [phone] (home) [phone] (work)    Last appt: 1/13/2021 DM   Next appt: 4/6/2021    Last Labs DM:   Lab Results   Component Value Date    LABA1C 5.5 01/13/2021

## 2021-03-01 NOTE — PROGRESS NOTES
[facility]  Outpatient Physical Therapy  Phone: [phone]   Fax: [phone]   Physical Therapy Re-Certification Plan of Care    Dear  Dr. Raad Young  We had the pleasure of treating the following patient for physical therapy services at [facility] Out[facility] Therapy. A summary of our findings can be found in the updated assessment below. This includes our plan of care. If you have any questions or concerns regarding these findings, please do not hesitate to contact me at the office phone number checked above. Thank you for the referral.     Physician Signature:________________________________Date:__________________  By signing above (or electronic signature), therapists plan is approved by physician      Functional Outcome: NDI 8; 16%; LEONA 16; 35.56%    Overall Response to Treatment:   [x]Patient is responding well to treatment and improvement is noted with regards  to goals   []Patient should continue to improve in reasonable time if they continue HEP   []Patient has plateaued and is no longer responding to skilled PT intervention    []Patient is getting worse and would benefit from return to referring MD   []Patient unable to adhere to initial POC   []Other:     Date range of Visits: Eval-2021  Total Visits: 5    Recommendation:     [x]Continue PT   []Hold PT, pending MD visit    Physical Therapy Daily Treatment Note  Date:  3/1/2021    Patient Name:  Sumeet Garcia    :  1965  MRN: [id number]  Medical/Treatment Diagnosis Information:  · Diagnosis: S39.012A strain of mm, fascia, tendons low back, S16. 1XXA strain of mm, fascia, tendons cervical region  · Treatment Diagnosis: Decreased flexibility and strength shoulder, neck, LB, LE, increased pain neck and low back.   Insurance/Certification information:  PT Insurance Information:  approved 9 visits 2021-2021  Physician Information:  Referring Practitioner:  Alexi 12 of care signed (Y/N): [x]  Yes []  No Cosign , and PN     Date of Patient follow up with Physician: 3/11/21   Progress Report: []  Yes  [x]  No     Date Range for reporting period:  Beginning 2/15/21  Endin/24    Progress report due (10 Rx/or 30 days whichever is less): , send PN to Dr. Valerie Davison weekly, visit #9      Recertification due (POC duration/ or 90 days whichever is less): visit #9, 21     Visit # Insurance Allowable Auth required? Date Range     Will do PN to MD last approved visit 3/5   9 till  [x]  Yes-approved  []  No 2021     Latex Allergy:  [x]NO      []YES  Preferred Language for Healthcare:   [x]English       []other:    Functional Scale:       Date assessed:2/15/2021   NDI 23 Oswestry 23: raw score ; dysfunction = NDI 46%, Oswestry 51%  NDI 8 Oswestry 16: raw score ; dysfunction = NDI 16%, [id number]% Assessed on 2021      Pain level:  3/10 Neck  and  4-5/10 low back    SUBJECTIVE:  Pt working half days, restrictions with no lifting/delivering anything over 5#. First day back Fri . Neck pain minimal, LBP 4-5/10. Pt feels like hands on, Estim and MHP help with pain esvin with RTW.    : Patient states overall he feels about 35% improved since starting PT. Lysbeth Carrel Patient states his neck has gradually become less painful and his NDI improved from 46 to 16% disability. He reports only having infrquent slight HAs and is able to drive unlimited but with some neck pain. His Low back pain is at a higher intensity and he reports he is still limited in bending forward and lifting and has to be slow and cautious. He states his standing and sitting  tolerance is 1 hour due to back pain . He reports only being able to walk up to a 1/4 mile due to LBP. He did improve on his LEONA from 51% to 35.56%.       OBJECTIVE: : swelling min/mod L>R lower leg, per pt this happens  : PNCervical; AROM: Chin to chest, rotation l 72 (pain on L ) , R 82; Upper quarter strength: rhomboids R 5/5, L 4-/5, infraspinatus R 5/5, L 4/5, serratus anterior L 4/5, R 4-/5, GH Flexion R 4/5 Pain, L 5/5 , Posture: reproduces pain with Combined SB/ROT/ext; Lumbar Spine L pubic upslip, L5 NRLSR./ERSL, L4 FRSL, LOL sacral torsion  RESTRICTIONS/PRECAUTIONS: HTN, DM, gastric sleeve, hx R knee injury 2012    Exercises/Interventions:     Therapeutic Exercises (74426)  Start time 1:45pm  End time: 2:13pm Resistance / level Sets/sec Reps Notes   HEP performed and gone over see below       Stepper sci fit seat 20 , arms 7 Level 2.5 5 min     swiss ball LB A/P, lat, CW, CCW green 1 10ea    Head on ball or pillow cervical NPV      IB  HR/TR  1/30\"  2 2  10    Stairs hams    hip flexor stretch L/R  30\"  60\" 2 L/R  1 L/R    tband mid row, LPD, high row Blue 2 10    Pulley roman flex stretch    UBE      1. 5'F/1.5B=3' 10 slow    Standing doorway bicep stretch  10\" 3 B 2/22   Standing Doorway pec stretch   15\" 3 B 2/22   Wall slides 2/22   Forearm wall slides 2/22   Swiss Ball Deep knee to chest  Red 1 10 2/24   Bent knee fall outs  L/R  1 10 2/26                        Therapeutic Activities (10904)  Start time:  End time:       Reassessed patient' s strength and ROM, discussed goals and PT POC, and progress thus far    2/24                        Neuromuscular Re-ed (32419)  Start time:  End time:                                                 Manual Intervention ([id number])  Start time: 2:13pm  End time: 2:28pm       Prone STM B paraspinals cervical, thoracic, lumbar mm,  gentle manual quad stretches (R side tight from old knee injury), supine gentle manual stretches B hams, pirif, glut  15 min        2/22                                    Modalities: 3/1, 2/26, 2/22, 2/19 pt request to do Estim and MHP today, start time 2:28-2:43pm Premod estim 2 electrodes to cervical/UT, 2 to Lumbar paraspinals with MHP to both areas with LE on grey bolster  2/15 15 min start time 10:30-10:45am supine with grey bolster under LE's CP in pillow case to neck, CP with towel over to LB, premod estim to neck and low back, covered with warm blanket. Pt has been using CP and heating pad prn at home. Pt. Education:  -patient educated on diagnosis, prognosis and expectations for rehab  -all patient questions were answered    HEP instruction:  2/19: reviewed and performed all HEP, pt needing assist with some to do correctly, encouraged pt to do over weekend. HEP instruction: Written HEP instructions provided and reviewed  Access Code: [email or web address]   URL: Publicate/   Date: 02/15/2021   Prepared by: Candace Walden     Exercises   · Supine Shoulder Flexion AAROM with Dowel - 5 reps - 2 sets - 1x daily - 7x weekly   · Seated Cervical Retraction - 5 reps - 2 sets - 1x daily - 7x weekly   · Seated Cervical Rotation AROM - 5 reps - 2 sets - 1x daily - 7x weekly   · Seated Cervical Retraction and Extension - 5 reps - 2 sets - 1x daily - 7x weekly   · Seated Cervical Sidebending AROM - 5 reps - 2 sets - 1x daily - 7x weekly   · Seated Scapular Retraction - 10 reps - 2 sets - 1x daily - 7x weekly   · Supine Posterior Pelvic Tilt - 10 reps - 2 sets - 1x daily - 7x weekly   · Supine Lower Trunk Rotation - 10 reps - 2 sets - 1x daily - 7x weekly   · Supine Single Knee to Chest Stretch - 4 reps - 2 sets - 15 hold - 1x daily - 7x weekly   · Prone Press Up on Elbows - 4 reps - 2 sets - 1x daily - 7x weekly   · Prone Knee Flexion - 5 reps - 2 sets - 1x daily - 7x weekly     Therapeutic Exercise and NMR EXR  [x] (59961) Provided verbal/tactile cueing for activities related to strengthening, flexibility, endurance, ROM for improvements in  [x] LE / Lumbar: LE, proximal hip, and core control with self care, mobility, lifting, ambulation.   [x] UE / Cervical: cervical, postural, scapular, scapulothoracic and UE control with self care, reaching, carrying, lifting, house/yardwork, driving, computer work.  [] (69161) Provided Home Exercise Program:    [x] (19078) Reviewed/Progressed HEP activities related to strengthening, flexibility, endurance, ROM of   [x] LE / Lumbar: core, proximal hip and LE for functional self-care, mobility, lifting and ambulation/stair navigation   [x] UE / Cervical: cervical, postural, scapular, scapulothoracic and UE control with self care, reaching, carrying, lifting, house/yardwork, driving, computer work  [] (99646)Reviewed/Progressed HEP activities related to improving balance, coordination, kinesthetic sense, posture, motor skill, proprioception of   [] LE: core, proximal hip and LE for self care, mobility, lifting, and ambulation/stair navigation    [] UE / Cervical: cervical, postural,  scapular, scapulothoracic and UE control with self care, reaching, carrying, lifting, house/yardwork, driving, computer work    Manual Treatments:  PROM / STM / Oscillations-Mobs:  G-I, II, III, IV (PA's, Inf., Post.)  [x] (66614) Provided manual therapy to mobilize LE, proximal hip and/or LS spine soft tissue/joints for the purpose of modulating pain, promoting relaxation,  increasing ROM, reducing/eliminating soft tissue swelling/inflammation/restriction, improving soft tissue extensibility and allowing for proper ROM for normal function with   [x] LE / lumbar: self care, mobility, lifting and ambulation. [x] UE / Cervical: self care, reaching, carrying, lifting, house/yardwork, driving, computer work. Modalities: see above  [] (80576) Vasopneumatic compression: Utilized vasopneumatic compression to decrease edema / swelling for the purpose of improving mobility and quad tone / recruitment which will allow for increased overall function including but not limited to self-care, transfers, ambulation, and ascending / descending stairs.      Charges:  Timed Code Treatment Minutes: 53   Total Treatment Minutes: 68     [] EVAL - LOW (31002)   [] EVAL - MOD (96443)  [] EVAL - HIGH (22369)  [] Dellar Bearded (73359)  [x] YO(88153) x 2     [] Ionto  [] NMR (90188) x       [] Vaso  [x] Manual (50512) x 1      [] Ultrasound  [] TA x        [] Mech Traction (97133)  [] Aquatic Therapy x     [x] ES (un) (46396):1   [] Home Management Training x  [] ES(attended) (02196)   [] Dry Needling 1-2 muscles (20373):  [] Dry Needling 3+ muscles (286506  [] Group:      [] Other:     GOALS:   Patient stated goal: No pain neck and low back for RTW  [x]? Progressing: []? Met: []? Not Met: []? Adjusted     Therapist goals for Patient:   Short Term Goals: To be achieved in: 2 weeks  1. Independent in HEP and progression per patient tolerance, in order to prevent re-injury. []? Progressing: [x]? Met: []? Not Met: []? Adjusted  2. Patient will have a decrease in pain to facilitate improvement in movement, function, and ADLs as indicated by Functional Deficits. []? Progressing: [x]? Met: []? Not Met: []? Adjusted     Long Term Goals: To be achieved in: 3 weeks  1. Disability index score of 10% or less for the NDI and/or LEONA to assist with reaching prior level of function. [x]? Progressing: []? Met: []? Not Met: []? Adjusted  2. Patient will demonstrate increased AROM to [facility] of cervical/thoracic spine and good LS mobility, good hip ROM to allow for proper joint functioning as indicated by patients Functional Deficits. [x]? Progressing: []? Met: []? Not Met: []? Adjusted  3. Patient will demonstrate an increase in postural awareness and control and activation of deep cervical stabilizers to allow for proper functional mobility as indicated by patients Functional Deficits. [x]? Progressing: []? Met: []? Not Met: []? Adjusted  4. Patient will demonstrate an increase in Strength to good proximal hip and core activation to allow for proper functional mobility as indicated by patients Functional Deficits. [x]? Progressing: []? Met: []? Not Met: []? Adjusted  5.  Patient will return to functional activities including RTW, lifting  without increased symptoms or restriction. [x]? Progressing: []? Met: []? Not Met: []? Adjusted  Overall Progression Towards Functional goals/ Treatment Progress Update:  [] Patient is progressing as expected towards functional goals listed. [x] Progression is slowed due to complexities/Impairments listed. [] Progression has been slowed due to co-morbidities. [] Plan just implemented, too soon to assess goals progression <30days   [] Goals require adjustment due to lack of progress  [] Patient is not progressing as expected and requires additional follow up with physician  [] Other    Persisting Functional Limitations/Impairments:  []Sleeping [x]Sitting               [x]Standing []Transfers        [x]Walking [x]Kneeling               [x]Stairs [x]Squatting / bending   [x]ADLs [x]Reaching  [x]Lifting  []Housework  [x]Driving [x]Job related tasks  []Sports/Recreation []Other:        ASSESSMENT:  Patient more sore today due to RTW this am. Pt working 1/2 days with 5# lifting restrictions. Pt con't with tightness upper thoracic/cervical and lumbar. Needs con't flexib, strength ex and manual to return to PLOF. Treatment/Activity Tolerance:  [x] Patient able to complete tx [] Patient limited by fatigue  [x] Patient limited by pain  [] Patient limited by other medical complications  [] Other:     Prognosis: [x] Good [] Fair  [] Poor    Patient Requires Follow-up: [x] Yes  [] No    Plan for next treatment session:see above, manual prn, mod prn, flexib and strength ex  PLAN: See eval. PT 3x / week for 3 weeks. [x] Continue per plan of care [] Alter current plan (see comments)  [] Plan of care initiated [] Hold pending MD visit [] Discharge    Electronically signed by: Mason Osborne PT, DPT [id number]  Note: If patient does not return for scheduled/ recommended follow up visits, this note will serve as a discharge from care along with most recent update on progress.

## 2021-03-03 ENCOUNTER — TELEPHONE (OUTPATIENT)
Dept: FAMILY MEDICINE CLINIC | Age: 56
End: 2021-03-03

## 2021-03-03 ENCOUNTER — HOSPITAL ENCOUNTER (OUTPATIENT)
Dept: PHYSICAL THERAPY | Age: 56
Setting detail: THERAPIES SERIES
Discharge: HOME | End: 2021-03-03
Payer: OTHER GOVERNMENT

## 2021-03-03 PROCEDURE — G0283 ELEC STIM OTHER THAN WOUND: HCPCS

## 2021-03-03 PROCEDURE — 97140 MANUAL THERAPY 1/> REGIONS: CPT

## 2021-03-03 PROCEDURE — 97110 THERAPEUTIC EXERCISES: CPT

## 2021-03-03 NOTE — FLOWSHEET NOTE
[facility]  Outpatient Physical Therapy  Phone: [phone]   Fax: [phone]   Physical Therapy Daily Treatment Note  Date:  3/3/2021    Patient Name:  Elinor Nino    :  1965  MRN: [id number]  Medical/Treatment Diagnosis Information:  · Diagnosis: S39.012A strain of mm, fascia, tendons low back, S16. 1XXA strain of mm, fascia, tendons cervical region  · Treatment Diagnosis: Decreased flexibility and strength shoulder, neck, LB, LE, increased pain neck and low back. Insurance/Certification information:  PT Insurance Information:  approved 9 visits 2021-2021  Physician Information:  Referring Practitioner: Dr. Martha Walden of care signed (Y/N): []  Yes [x]  No Cosign , and PN     Date of Patient follow up with Physician: 3/11/21   Progress Report: []  Yes  [x]  No     Date Range for reporting period:  Beginning 2/15/21  Endin/24    Progress report due (10 Rx/or 30 days whichever is less): , send PN to Dr. Shy Ross weekly, visit #9      Recertification due (POC duration/ or 90 days whichever is less): visit #9, 21     Visit # Insurance Allowable Auth required? Date Range     Will do PN to MD last approved visit 3/5   9 till  [x]  Yes-approved  []  No 2021     Latex Allergy:  [x]NO      []YES  Preferred Language for Healthcare:   [x]English       []other:    Functional Scale:       Date assessed:2/15/2021   NDI 23 Oswestry 23: raw score ; dysfunction = NDI 46%, Oswestry 51%  NDI 8 Oswestry 16: raw score ; dysfunction = NDI 16%, Uehpiiq01.561% Assessed on 2021      Pain level:  1-2/10 Neck  and  3/10 low back    SUBJECTIVE:  Pt working half days, restrictions with no lifting/delivering anything over 5#. First day back Fri . Neck pain minimal, LBP 3/10. Pt feels like hands on, Estim and MHP help with pain esvin with RTW.    : Patient states overall he feels about 35% improved since starting PT. Bhavana Rogers states his neck has gradually become less painful and his NDI improved from 46 to 16% disability. He reports only having infrquent slight HAs and is able to drive unlimited but with some neck pain. His Low back pain is at a higher intensity and he reports he is still limited in bending forward and lifting and has to be slow and cautious. He states his standing and sitting  tolerance is 1 hour due to back pain . He reports only being able to walk up to a 1/4 mile due to LBP. He did improve on his LEONA from 51% to 35.56%.       OBJECTIVE: 2/26: swelling min/mod L>R lower leg, per pt this happens  2/24: PNCervical; AROM: Chin to chest, rotation l 72 (pain on L ) , R 82; Upper quarter strength: rhomboids R 5/5, L 4-/5, infraspinatus R 5/5, L 4/5, serratus anterior L 4/5, R 4-/5, GH Flexion R 4/5 Pain, L 5/5 , Posture: reproduces pain with Combined SB/ROT/ext; Lumbar Spine L pubic upslip, L5 NRLSR./ERSL, L4 FRSL, LOL sacral torsion  RESTRICTIONS/PRECAUTIONS: HTN, DM, gastric sleeve, hx R knee injury 2012    Exercises/Interventions:     Therapeutic Exercises (08801)  Start time 1:11pm  End time: 1:34pm Resistance / level Sets/sec Reps Notes   HEP performed and gone over see below       Stepper sci fit seat 20 , arms 7 Level 2.5 5 min     swiss ball LB A/P, lat, CW, CCW green 1 12 ea    Head on ball or pillow cervical       IB  HR/TR  1/30\"  2 2  10    Stairs hams    hip flexor stretch L/R  30\"  60\" 2 L/R  1 L/R    tband mid row, LPD, high row Blue 2 10    Pulley roman flex stretch    UBE       10 slow    Standing doorway bicep stretch  2/22   Standing Doorway pec stretch   2/22   Wall slides 2/22   Forearm wall slides 2/22   Swiss Ball Deep knee to chest  Red 1 10 2/24   Bent knee fall outs  L/R  1 10 2/26                        Therapeutic Activities (26102)  Start time:  End time:       Reassessed patient' s strength and ROM, discussed goals and PT POC, and progress thus far    2/24 Neuromuscular Re-ed (24565)  Start time:  End time:                                                 Manual Intervention (32239)  Start time: 1:35pm  End time: 1:45pm       Prone STM B paraspinals cervical, thoracic, lumbar mm,  gentle manual quad stretches (R side tight from old knee injury), supine gentle manual stretches B hams, pirif, glut  15 min        2/22                                    Modalities: 3/1, 2/26, 2/22, 2/19 pt request to do Estim and MHP today, start time 2:28-2:43pm Premod estim 2 electrodes to cervical/UT, 2 to Lumbar paraspinals with MHP to both areas with LE on grey bolster  2/15 15 min start time 10:30-10:45am supine with grey bolster under LE's CP in pillow case to neck, CP with towel over to LB, premod estim to neck and low back, covered with warm blanket. Pt has been using CP and heating pad prn at home. Pt. Education:  -patient educated on diagnosis, prognosis and expectations for rehab  -all patient questions were answered    HEP instruction:  2/19: reviewed and performed all HEP, pt needing assist with some to do correctly, encouraged pt to do over weekend. HEP instruction: Written HEP instructions provided and reviewed  Access Code: [email or web address]   URL: [email or web address]   Date: 02/15/2021   Prepared by:  Kay Hall     Exercises   · Supine Shoulder Flexion AAROM with Dowel - 5 reps - 2 sets - 1x daily - 7x weekly   · Seated Cervical Retraction - 5 reps - 2 sets - 1x daily - 7x weekly   · Seated Cervical Rotation AROM - 5 reps - 2 sets - 1x daily - 7x weekly   · Seated Cervical Retraction and Extension - 5 reps - 2 sets - 1x daily - 7x weekly   · Seated Cervical Sidebending AROM - 5 reps - 2 sets - 1x daily - 7x weekly   · Seated Scapular Retraction - 10 reps - 2 sets - 1x daily - 7x weekly   · Supine Posterior Pelvic Tilt - 10 reps - 2 sets - 1x daily - 7x weekly   · Supine Lower Trunk Rotation - 10 reps - 2 sets - 1x daily - 7x weekly   · Supine Single Knee to Chest Stretch - 4 reps - 2 sets - 15 hold - 1x daily - 7x weekly   · Prone Press Up on Elbows - 4 reps - 2 sets - 1x daily - 7x weekly   · Prone Knee Flexion - 5 reps - 2 sets - 1x daily - 7x weekly     Therapeutic Exercise and NMR EXR  [x] (77211) Provided verbal/tactile cueing for activities related to strengthening, flexibility, endurance, ROM for improvements in  [x] LE / Lumbar: LE, proximal hip, and core control with self care, mobility, lifting, ambulation. [x] UE / Cervical: cervical, postural, scapular, scapulothoracic and UE control with self care, reaching, carrying, lifting, house/yardwork, driving, computer work.  [] (45102) Provided verbal/tactile cueing for activities related to improving balance, coordination, kinesthetic sense, posture, motor skill, proprioception to assist with   [] LE / lumbar: LE, proximal hip, and core control in self care, mobility, lifting, ambulation and eccentric single leg control. [] UE / cervical: cervical, scapular, scapulothoracic and UE control with self care, reaching, carrying, lifting, house/yardwork, driving, computer work.   [] (38478) Therapist is in constant attendance of 2 or more patients providing skilled therapy interventions, but not providing any significant amount of measurable one-on-one time to either patient, for improvements in  [] LE / lumbar: LE, proximal hip, and core control in self care, mobility, lifting, ambulation and eccentric single leg control. [] UE / cervical: cervical, scapular, scapulothoracic and UE control with self care, reaching, carrying, lifting, house/yardwork, driving, computer work.      NMR and Therapeutic Activities:    [] (03484 or 98064) Provided verbal/tactile cueing for activities related to improving balance, coordination, kinesthetic sense, posture, motor skill, proprioception and motor activation to allow for proper function of   [] LE: / Lumbar core, proximal hip and LE with self care and ADLs  [] UE / Cervical: cervical, postural, scapular, scapulothoracic and UE control with self care, carrying, lifting, driving, computer work.   [] (57135) Gait Re-education- Provided training and instruction to the patient for proper LE, core and proximal hip recruitment and positioning and eccentric body weight control with ambulation re-education including up and down stairs     Home Management Training / Self Care:  [] (05558) Home Management Training / Self-care: ADLs and compensatory training, meal preparation, safety procedures and instruction in use of adaptive equipment, including bathing, grooming, dressing, personal hygiene, basic household cleaning and chores.      Home Exercise Program:    [x] (33185) Reviewed/Progressed HEP activities related to strengthening, flexibility, endurance, ROM of   [x] LE / Lumbar: core, proximal hip and LE for functional self-care, mobility, lifting and ambulation/stair navigation   [x] UE / Cervical: cervical, postural, scapular, scapulothoracic and UE control with self care, reaching, carrying, lifting, house/yardwork, driving, computer work  [] (68060)Reviewed/Progressed HEP activities related to improving balance, coordination, kinesthetic sense, posture, motor skill, proprioception of   [] LE: core, proximal hip and LE for self care, mobility, lifting, and ambulation/stair navigation    [] UE / Cervical: cervical, postural,  scapular, scapulothoracic and UE control with self care, reaching, carrying, lifting, house/yardwork, driving, computer work    Manual Treatments:  PROM / STM / Oscillations-Mobs:  G-I, II, III, IV (PA's, Inf., Post.)  [x] (09034) Provided manual therapy to mobilize LE, proximal hip and/or LS spine soft tissue/joints for the purpose of modulating pain, promoting relaxation,  increasing ROM, reducing/eliminating soft tissue swelling/inflammation/restriction, improving soft tissue extensibility and allowing for proper ROM for normal function with   [x] LE / lumbar: self care, mobility, lifting and ambulation. [x] UE / Cervical: self care, reaching, carrying, lifting, house/yardwork, driving, computer work. Modalities: see above  [] (97083) Vasopneumatic compression: Utilized vasopneumatic compression to decrease edema / swelling for the purpose of improving mobility and quad tone / recruitment which will allow for increased overall function including but not limited to self-care, transfers, ambulation, and ascending / descending stairs. Charges:  Timed Code Treatment Minutes: 34   Total Treatment Minutes: 49     [] EVAL - LOW (05854)   [] EVAL - MOD (68138)  [] EVAL - HIGH (78024)  [] RE-EVAL (91799)  [x] HE(73681) x 1     [] Ionto  [] NMR (00149) x       [] Vaso  [x] Manual (51473) x 1      [] Ultrasound  [] TA x        [] Mech Traction (28537)  [] Aquatic Therapy x     [x] ES (un) (47355):1   [] Home Management Training x  [] ES(attended) (78148)   [] Dry Needling 1-2 muscles (85938):  [] Dry Needling 3+ muscles (231828  [] Group:      [] Other:     GOALS:   Patient stated goal: No pain neck and low back for RTW  [x]? Progressing: []? Met: []? Not Met: []? Adjusted     Therapist goals for Patient:   Short Term Goals: To be achieved in: 2 weeks  1. Independent in HEP and progression per patient tolerance, in order to prevent re-injury. []? Progressing: [x]? Met: []? Not Met: []? Adjusted  2. Patient will have a decrease in pain to facilitate improvement in movement, function, and ADLs as indicated by Functional Deficits. []? Progressing: [x]? Met: []? Not Met: []? Adjusted     Long Term Goals: To be achieved in: 3 weeks  1. Disability index score of 10% or less for the NDI and/or LEONA to assist with reaching prior level of function. [x]? Progressing: []? Met: []? Not Met: []? Adjusted  2.  Patient will demonstrate increased AROM to [facility] of cervical/thoracic spine and good LS mobility, good hip ROM to allow for proper joint functioning as indicated by patients Functional Deficits. [x]? Progressing: []? Met: []? Not Met: []? Adjusted  3. Patient will demonstrate an increase in postural awareness and control and activation of deep cervical stabilizers to allow for proper functional mobility as indicated by patients Functional Deficits. [x]? Progressing: []? Met: []? Not Met: []? Adjusted  4. Patient will demonstrate an increase in Strength to good proximal hip and core activation to allow for proper functional mobility as indicated by patients Functional Deficits. [x]? Progressing: []? Met: []? Not Met: []? Adjusted  5. Patient will return to functional activities including RTW, lifting  without increased symptoms or restriction. [x]? Progressing: []? Met: []? Not Met: []? Adjusted  Overall Progression Towards Functional goals/ Treatment Progress Update:  [] Patient is progressing as expected towards functional goals listed. [x] Progression is slowed due to complexities/Impairments listed. [] Progression has been slowed due to co-morbidities. [] Plan just implemented, too soon to assess goals progression <30days   [] Goals require adjustment due to lack of progress  [] Patient is not progressing as expected and requires additional follow up with physician  [] Other    Persisting Functional Limitations/Impairments:  []Sleeping [x]Sitting               [x]Standing []Transfers        [x]Walking [x]Kneeling               [x]Stairs [x]Squatting / bending   [x]ADLs [x]Reaching  [x]Lifting  []Housework  [x]Driving [x]Job related tasks  []Sports/Recreation []Other:        ASSESSMENT:  Patient  Slowly improving from his  RTW this past week. Pt working 1/2 days with 5# lifting restrictions. Pt con't with tightness upper thoracic/cervical and lumbar. Needs con't flexib, strength ex and manual to return to PLOF.   Treatment/Activity Tolerance:  [x] Patient able to complete tx [] Patient limited by fatigue  [x] Patient limited by pain  [] Patient limited by other medical complications  [] Other:     Prognosis: [x] Good [] Fair  [] Poor    Patient Requires Follow-up: [x] Yes  [] No    Plan for next treatment session:see above, manual prn, mod prn, flexib and strength ex  PLAN: See eval. PT 3x / week for 3 weeks. [x] Continue per plan of care [] Alter current plan (see comments)  [] Plan of care initiated [] Hold pending MD visit [] Discharge    Electronically signed by: Kiera [address], MSPT #[id number]  Note: If patient does not return for scheduled/ recommended follow up visits, this note will serve as a discharge from care along with most recent update on progress.

## 2021-03-05 ENCOUNTER — HOSPITAL ENCOUNTER (OUTPATIENT)
Dept: PHYSICAL THERAPY | Age: 56
Setting detail: THERAPIES SERIES
Discharge: HOME | End: 2021-03-05
Payer: OTHER GOVERNMENT

## 2021-03-05 PROCEDURE — 97140 MANUAL THERAPY 1/> REGIONS: CPT

## 2021-03-05 PROCEDURE — G0283 ELEC STIM OTHER THAN WOUND: HCPCS

## 2021-03-05 PROCEDURE — 97110 THERAPEUTIC EXERCISES: CPT

## 2021-03-05 NOTE — FLOWSHEET NOTE
[facility]  Outpatient Physical Therapy  Phone: [phone]   Fax: [phone]   Physical Therapy Daily Treatment Note  Date:  3/5/2021    Patient Name:  Rojelio Sun    :  1965  MRN: [id number]  Medical/Treatment Diagnosis Information:  · Diagnosis: S39.012A strain of mm, fascia, tendons low back, S16. 1XXA strain of mm, fascia, tendons cervical region  · Treatment Diagnosis: Decreased flexibility and strength shoulder, neck, LB, LE, increased pain neck and low back. Insurance/Certification information:  PT Insurance Information:  approved 9 visits 2021-2021  Physician Information:  Referring Practitioner: Dr. Angela Levy of care signed (Y/N): [x]  Yes []  No Cosigned on , and PN     Date of Patient follow up with Physician: 3/11/21   Requesting more PT 3x week for 3 weeks = 9 PT visits, MD needs to fill out C-9 form for more PT  Progress Report: [x]  Yes  []  No     Date Range for reporting period:  Beginning 2/15/21  Ending: 3/5/21    Progress report due (10 Rx/or 30 days whichever is less): WC, send PN to Dr. Gustavo Adames weekly, visit #9      Recertification due (POC duration/ or 90 days whichever is less): visit #9, 21     Visit # Insurance Allowable Auth required? Date Range    till  [x]  Yes-approved  []  No 2021     Latex Allergy:  [x]NO      []YES  Preferred Language for Healthcare:   [x]English       []other:    Functional Scale:       Date assessed:    NDI 8 Oswestry 16: raw score ; dysfunction = NDI 16%, Axufrxu60.561% Assessed on 3/5/2021      Pain level:  1-2/10 Neck  and  3/10 low back    SUBJECTIVE:  Pt working half days, restrictions with no lifting/delivering anything over 5#. First day back Fri . Neck pain minimal, LBP 3/10. Pt feels like hands on, Estim and MHP help with pain esvin with RTW. Pt now feels 40-50% better with being back to work.      : Patient states overall he feels about 35% improved since starting PT. Angelia Cazares Patient states his neck has gradually become less painful and his NDI improved from 46 to 16% disability. He reports only having infrquent slight HAs and is able to drive unlimited but with some neck pain. His Low back pain is at a higher intensity and he reports he is still limited in bending forward and lifting and has to be slow and cautious. He states his standing and sitting  tolerance is 1 hour due to back pain . He reports only being able to walk up to a 1/4 mile due to LBP. He did improve on his LEONA from 51% to 35.56%.       OBJECTIVE: 3/5: see oswestry and NDI above, cervical AROM WFL but stiff and sore, UE strength 4 to5/5.  2/26: swelling min/mod L>R lower leg, per pt this happens  2/24: PNCervical; AROM: Chin to chest, rotation l 72 (pain on L ) , R 82; Upper quarter strength: rhomboids R 5/5, L 4-/5, infraspinatus R 5/5, L 4/5, serratus anterior L 4/5, R 4-/5, GH Flexion R 4/5 Pain, L 5/5 , Posture: reproduces pain with Combined SB/ROT/ext; Lumbar Spine L pubic upslip, L5 NRLSR./ERSL, L4 FRSL, LOL sacral torsion  RESTRICTIONS/PRECAUTIONS: HTN, DM, gastric sleeve, hx R knee injury 2012    Exercises/Interventions:     Therapeutic Exercises (01960)  Start time 1:45pm  End time: 215pm Resistance / level Sets/sec Reps Notes   HEP performed and gone over see below       Stepper sci fit seat 20 , arms 7 Level 2.5 5 min     swiss ball LB A/P, lat, CW, CCW green 1 12 ea    Head on ball or pillow cervical       IB  HR/TR  1/30\"  2 2  10    Stairs hams    hip flexor stretch L/R  30\"  60\" 2 L/R  1 L/R    tband mid row, LPD, high row Blue 2 10    Pulley roman flex stretch    UBE       10 slow    Standing doorway bicep stretch  2/22   Standing Doorway pec stretch   2/22   Wall slides 2/22   Forearm wall slides 2/22   Swiss Ball Deep knee to chest  Red 1 10 2/24   Bent knee fall outs  L/R  1 10 2/26                        Therapeutic Activities (25533)  Start time:  End time: Reassessed patient' s strength and ROM, discussed goals and PT POC, and progress thus far    2/24                        Neuromuscular Re-ed (40935)  Start time:  End time:                                                 Manual Intervention (22245)  Start time: 1:35pm  End time: 1:45pm       Prone STM B paraspinals cervical, thoracic, lumbar mm,  gentle manual quad stretches (R side tight from old knee injury), supine gentle manual stretches B hams, pirif, glut  15 min        2/22                                    Modalities: 3/5, 3/1, 2/26, 2/22, 2/19 pt request to do Estim and MHP today, start time 2:28-2:45pm Premod estim 2 electrodes to cervical/UT, 2 to Lumbar paraspinals with MHP to both areas with LE on grey bolster  2/15 15 min start time 10:30-10:45am supine with grey bolster under LE's CP in pillow case to neck, CP with towel over to LB, premod estim to neck and low back, covered with warm blanket. Pt has been using CP and heating pad prn at home. Pt. Education:  -patient educated on diagnosis, prognosis and expectations for rehab  -all patient questions were answered    HEP instruction:  2/19: reviewed and performed all HEP, pt needing assist with some to do correctly, encouraged pt to do over weekend. HEP instruction: Written HEP instructions provided and reviewed  Access Code: [email or web address]   URL: [email or web address]   Date: 02/15/2021   Prepared by:  Kay Hall     Exercises   · Supine Shoulder Flexion AAROM with Dowel - 5 reps - 2 sets - 1x daily - 7x weekly   · Seated Cervical Retraction - 5 reps - 2 sets - 1x daily - 7x weekly   · Seated Cervical Rotation AROM - 5 reps - 2 sets - 1x daily - 7x weekly   · Seated Cervical Retraction and Extension - 5 reps - 2 sets - 1x daily - 7x weekly   · Seated Cervical Sidebending AROM - 5 reps - 2 sets - 1x daily - 7x weekly   · Seated Scapular Retraction - 10 reps - 2 sets - 1x daily - 7x weekly   · Supine Posterior Pelvic Tilt - 10 reps - 2 sets - 1x daily - 7x weekly   · Supine Lower Trunk Rotation - 10 reps - 2 sets - 1x daily - 7x weekly   · Supine Single Knee to Chest Stretch - 4 reps - 2 sets - 15 hold - 1x daily - 7x weekly   · Prone Press Up on Elbows - 4 reps - 2 sets - 1x daily - 7x weekly   · Prone Knee Flexion - 5 reps - 2 sets - 1x daily - 7x weekly     Therapeutic Exercise and NMR EXR  [x] (16460) Provided verbal/tactile cueing for activities related to strengthening, flexibility, endurance, ROM for improvements in  [x] LE / Lumbar: LE, proximal hip, and core control with self care, mobility, lifting, ambulation. [x] UE / Cervical: cervical, postural, scapular, scapulothoracic and UE control with self care, reaching, carrying, lifting, house/yardwork, driving, computer work.  [] (06458) Provided verbal/tactile cueing for activities related to improving balance, coordination, kinesthetic sense, posture, motor skill, proprioception to assist with   [] LE / lumbar: LE, proximal hip, and core control in self care, mobility, lifting, ambulation and eccentric single leg control. [] UE / cervical: cervical, scapular, scapulothoracic and UE control with self care, reaching, carrying, lifting, house/yardwork, driving, computer work.   [] (96814) Therapist is in constant attendance of 2 or more patients providing skilled therapy interventions, but not providing any significant amount of measurable one-on-one time to either patient, for improvements in  [] LE / lumbar: LE, proximal hip, and core control in self care, mobility, lifting, ambulation and eccentric single leg control. [] UE / cervical: cervical, scapular, scapulothoracic and UE control with self care, reaching, carrying, lifting, house/yardwork, driving, computer work.      NMR and Therapeutic Activities:    [] (59463 or 01512) Provided verbal/tactile cueing for activities related to improving balance, coordination, kinesthetic sense, posture, motor skill, proprioception and motor activation swelling/inflammation/restriction, improving soft tissue extensibility and allowing for proper ROM for normal function with   [x] LE / lumbar: self care, mobility, lifting and ambulation. [x] UE / Cervical: self care, reaching, carrying, lifting, house/yardwork, driving, computer work. Modalities: see above  [] (30121) Vasopneumatic compression: Utilized vasopneumatic compression to decrease edema / swelling for the purpose of improving mobility and quad tone / recruitment which will allow for increased overall function including but not limited to self-care, transfers, ambulation, and ascending / descending stairs. Charges:  Timed Code Treatment Minutes: 45   Total Treatment Minutes: 60     [] EVAL - LOW (74542)   [] EVAL - MOD (30019)  [] EVAL - HIGH (32244)  [] RE-EVAL (36948)  [x] CC(41814) x 2     [] Ionto  [] NMR (53528) x       [] Vaso  [x] Manual (16416) x 1      [] Ultrasound  [] TA x        [] Mech Traction (89806)  [] Aquatic Therapy x     [x] ES (un) (34920):1   [] Home Management Training x  [] ES(attended) (27369)   [] Dry Needling 1-2 muscles (88175):  [] Dry Needling 3+ muscles (703158  [] Group:      [] Other:     GOALS:   Patient stated goal: No pain neck and low back for RTW  [x]? Progressing: []? Met: []? Not Met: []? Adjusted     Therapist goals for Patient:   Short Term Goals: To be achieved in: 2 weeks  1. Independent in HEP and progression per patient tolerance, in order to prevent re-injury. []? Progressing: [x]? Met: []? Not Met: []? Adjusted  2. Patient will have a decrease in pain to facilitate improvement in movement, function, and ADLs as indicated by Functional Deficits. []? Progressing: [x]? Met: []? Not Met: []? Adjusted     Long Term Goals: To be achieved in: 3 weeks  1. Disability index score of 10% or less for the NDI and/or LEONA to assist with reaching prior level of function. [x]? Progressing: []? Met: []? Not Met: []? Adjusted  2.  Patient will demonstrate increased [facility] of cervical/thoracic spine and good LS mobility, good hip ROM to allow for proper joint functioning as indicated by patients Functional Deficits. []? Progressing: [x]? Met: []? Not Met: []? Adjusted  3. Patient will demonstrate an increase in postural awareness and control and activation of deep cervical stabilizers to allow for proper functional mobility as indicated by patients Functional Deficits. []? Progressing: [x]? Met: []? Not Met: []? Adjusted  4. Patient will demonstrate an increase in Strength to good proximal hip and core activation to allow for proper functional mobility as indicated by patients Functional Deficits. [x]? Progressing: []? Met: []? Not Met: []? Adjusted  5. Patient will return to functional activities including RTW, lifting  without increased symptoms or restriction. [x]? Progressing: []? Met: []? Not Met: []? Adjusted  Overall Progression Towards Functional goals/ Treatment Progress Update:  [] Patient is progressing as expected towards functional goals listed. [x] Progression is slowed due to complexities/Impairments listed. [] Progression has been slowed due to co-morbidities. [] Plan just implemented, too soon to assess goals progression <30days   [] Goals require adjustment due to lack of progress  [] Patient is not progressing as expected and requires additional follow up with physician  [] Other    Persisting Functional Limitations/Impairments:  []Sleeping [x]Sitting               [x]Standing []Transfers        [x]Walking [x]Kneeling               [x]Stairs [x]Squatting / bending   [x]ADLs [x]Reaching  [x]Lifting  []Housework  [x]Driving [x]Job related tasks  []Sports/Recreation []Other:        ASSESSMENT:  Patient slowly improving from his  RTW this past week. Pt working 1/2 days with 5# lifting restrictions. Pt con't with tightness upper thoracic/cervical and lumbar.  Needs con't flexib, strength ex and manual to return to PLOF.  Treatment/Activity Tolerance:  [x] Patient able to complete tx [] Patient limited by fatigue  [x] Patient limited by pain  [] Patient limited by other medical complications  [] Other:     Prognosis: [x] Good [] Fair  [] Poor    Patient Requires Follow-up: [x] Yes  [] No    Plan for next treatment session:see above, manual prn, mod prn, flexib and strength ex  PLAN: See eval. PT 3x / week for 3 weeks. [x] Continue per plan of care [] Alter current plan (see comments)  [] Plan of care initiated [] Hold pending MD visit [] Discharge    Electronically signed by: Booker Arce PT, DPT [id number]  Note: If patient does not return for scheduled/ recommended follow up visits, this note will serve as a discharge from care along with most recent update on progress.

## 2021-04-06 ENCOUNTER — OFFICE VISIT (OUTPATIENT)
Dept: FAMILY MEDICINE CLINIC | Age: 56
End: 2021-04-06
Payer: COMMERCIAL

## 2021-04-06 VITALS
HEART RATE: 68 BPM | SYSTOLIC BLOOD PRESSURE: 138 MMHG | HEIGHT: 70 IN | WEIGHT: 283 LBS | BODY MASS INDEX: 40.52 KG/M2 | OXYGEN SATURATION: 97 % | DIASTOLIC BLOOD PRESSURE: 82 MMHG | TEMPERATURE: 97.5 F

## 2021-04-06 DIAGNOSIS — E11.69 DIABETES MELLITUS TYPE 2 IN OBESE (HCC): Primary | ICD-10-CM

## 2021-04-06 DIAGNOSIS — I83.019 VENOUS STASIS ULCERS OF BOTH LOWER EXTREMITIES (HCC): ICD-10-CM

## 2021-04-06 DIAGNOSIS — L97.929 VENOUS STASIS ULCERS OF BOTH LOWER EXTREMITIES (HCC): ICD-10-CM

## 2021-04-06 DIAGNOSIS — I83.029 VENOUS STASIS ULCERS OF BOTH LOWER EXTREMITIES (HCC): ICD-10-CM

## 2021-04-06 DIAGNOSIS — G62.9 NEUROPATHY: ICD-10-CM

## 2021-04-06 DIAGNOSIS — I10 ESSENTIAL HYPERTENSION: ICD-10-CM

## 2021-04-06 DIAGNOSIS — G47.33 OSA (OBSTRUCTIVE SLEEP APNEA): ICD-10-CM

## 2021-04-06 DIAGNOSIS — L97.919 VENOUS STASIS ULCERS OF BOTH LOWER EXTREMITIES (HCC): ICD-10-CM

## 2021-04-06 DIAGNOSIS — E66.9 OBESITY (BMI 30-39.9): ICD-10-CM

## 2021-04-06 DIAGNOSIS — E66.9 DIABETES MELLITUS TYPE 2 IN OBESE (HCC): Primary | ICD-10-CM

## 2021-04-06 LAB — HBA1C MFR BLD: 6.4 %

## 2021-04-06 PROCEDURE — 99214 OFFICE O/P EST MOD 30 MIN: CPT | Performed by: FAMILY MEDICINE

## 2021-04-06 PROCEDURE — 83036 HEMOGLOBIN GLYCOSYLATED A1C: CPT | Performed by: FAMILY MEDICINE

## 2021-04-06 NOTE — PROGRESS NOTES
Brooklyn Hendrix is a 63 y.o. male. HPI:here for complex medical visit  Had lost quite a bit of weight all way down to 210 pounds after bariatric surgery but the weight has been gradually going back up  He has a CPAP machine at home at night but he often falls asleep in a chair and does not use it  He is not exercising much  He is eating more than he realizes and he would like to find some way to stop  His legs are starting to swell a bit again he has a small abrasion on the left anterior shin  Everyone in his household had Covid except him ,he is going to get the vaccine  Meds, vitamins and allergies reviewed with pt    ROS: No TIA's or unusual headaches, no dysphagia. No prolonged cough. No dyspnea or chest pain on exertion. No abdominal pain, change in bowel habits, black or bloody stools. No urinary tract symptoms. No new or unusual musculoskeletal symptoms. Prior to Visit Medications    Medication Sig Taking?  Authorizing Provider   ONETOUCH ULTRA strip USE 1 STRIP  DAILY AS NEEDED Yes Kavita Braun MD   gabapentin (NEURONTIN) 300 MG capsule 2 po bid Yes Kavita Braun MD   metoprolol succinate (TOPROL XL) 25 MG extended release tablet TAKE 1 TABLET DAILY (NEED AN OFFICE VISIT FOR ADDITIONAL REFILLS) Yes Kavita Braun MD   hydroCHLOROthiazide (HYDRODIURIL) 25 MG tablet Take 1 tablet by mouth every morning Yes Kavita Braun MD   tamsulosin (FLOMAX) 0.4 MG capsule TAKE 1 CAPSULE DAILY Yes Kavita Braun MD   verapamil (CALAN SR) 120 MG extended release tablet TAKE 1 TABLET DAILY Yes Kavita Braun MD   Multiple Vitamins-Minerals (BARIATRIC FUSION) CHEW Take 3 tablets by mouth daily Yes Historical Provider, MD   naproxen (NAPROSYN) 500 MG tablet Take 1 tablet by mouth 2 times daily as needed for Pain  Irvine Sites, PA-C   ibuprofen (ADVIL;MOTRIN) 800 MG tablet Take 1 tablet by mouth 3 times daily (with meals)  Kavita Braun MD       Past Medical History:   Diagnosis Date    Arthritis     Diabetes mellitus type 2 in obese ([facility]     Diabetes mellitus type 2 in obese ([facility]     DM type 2 (diabetes mellitus, type 2) ([facility]     HTN     HTN (hypertension)      replace inactive diagnosis    Lymphedema     Morbid obesity with BMI of 50.0-59.9, adult (HCC)     Neuropathy     feet    Neuropathy     feet    Neuropathy     feet    Obesity     HANNAH (obstructive sleep apnea) 2016    bipap    Stasis dermatitis of both legs     Tobacco abuse, continuous        Social History     Tobacco Use    Smoking status: Former Smoker     Packs/day: 1.00     Years: 43.00     Pack years: 43.00     Types: Cigarettes     Quit date: 2016     Years since quittin.9    Smokeless tobacco: Never Used    Tobacco comment: started smoking at age 14 - smoked up to 1 p.p.d    Substance Use Topics    Alcohol use: No     Alcohol/week: 0.0 standard drinks    Drug use: No       Family History   Problem Relation Age of Onset    Diabetes Mother     Cancer Mother     Arthritis Mother     Glaucoma Mother     Migraines Mother     Substance Abuse Father     Arthritis Father     Heart Disease Father     High Blood Pressure Father     Migraines Sister        Allergies   Allergen Reactions    Lisinopril Hives    Amoxicillin-Pot Clavulanate Diarrhea       OBJECTIVE:  /82   Pulse 68   Temp 97.5 °F (36.4 °C)   Ht 5' 10\" (1.778 m)   Wt 283 lb (128.4 kg)   SpO2 97%   BMI 40.61 kg/m²   GEN:  in NAD obese weight is up  NECK:  Supple without adenopathy. Bruits  CV:  Regular rate and rhythm, S1 and S2 normal, no murmurs, clicks  PULM:  Chest is clear, no wheezing ,  symmetric air entry throughout both lung fields. EXT: Repigmentation, lymphedema, mild pitting edema superficial abrasion left shin that is scabbed over without pus or redness or tenderness  NEURO: nonfocal  a1c = 6.4  ASSESSMENT/PLAN:  1. Diabetes mellitus type 2 in obese (HCC)  Worse, but still stable.   Probably drifting up as the weight goes up  Eye exam overdue  2. Venous stasis ulcers of both lower extremities (HCC)  No ulcers, still swollen  Compression stockings    3. Essential hypertension  stable, continue present medication    4. Neuropathy  Intermittent  Has inc med to gabapentin up to 100 mg twice daily    5. Obesity (BMI 30-39. 9)  Inc exercise 250 min of brisk walking or the equivalent per week    6.  HANNAH (obstructive sleep apnea)  CPAP nightly  Further wt loss    7 IMM - getting covid this week  shingrix in 3 mo

## 2021-06-29 DIAGNOSIS — R39.198 DECREASED URINE STREAM: ICD-10-CM

## 2021-06-29 RX ORDER — TAMSULOSIN HYDROCHLORIDE 0.4 MG/1
CAPSULE ORAL
Qty: 90 CAPSULE | Refills: 3 | Status: SHIPPED | OUTPATIENT
Start: 2021-06-29 | End: 2022-06-24

## 2021-06-29 RX ORDER — METOPROLOL SUCCINATE 25 MG/1
TABLET, EXTENDED RELEASE ORAL
Qty: 90 TABLET | Refills: 3 | Status: SHIPPED | OUTPATIENT
Start: 2021-06-29 | End: 2022-07-22

## 2021-06-29 NOTE — TELEPHONE ENCOUNTER
Medication:   Requested Prescriptions     Pending Prescriptions Disp Refills    tamsulosin (FLOMAX) 0.4 MG capsule [Pharmacy Med Name: TAMSULOSIN HCL CAPS 0.4MG] 90 capsule 3     Sig: TAKE 1 CAPSULE DAILY    verapamil (CALAN SR) 120 MG extended release tablet [Pharmacy Med Name: VERAPAMIL HCL ER TABS 120MG] 90 tablet 3     Sig: TAKE 1 TABLET DAILY    metoprolol succinate (TOPROL XL) 25 MG extended release tablet [Pharmacy Med Name: METOPROLOL SUCCINATE ER TABS 25MG] 90 tablet 3     Sig: TAKE 1 TABLET DAILY (NEED AN OFFICE VISIT FOR ADDITIONAL REFILLS)       Last Filled:  Flomax 06/05/2020 #90 3rf  Verapamil 04/22/2020 #90 3rf  Metoprolol 08/03/2020 #90 2rf    Patient Phone Number: [phone] (home) [phone] (work)    Last appt: 04/06/2021  Next appt: Visit date not found    Lab Results   Component Value Date     01/13/2021    K 5.3 (H) 01/13/2021     01/13/2021    CO2 27 01/13/2021    BUN 16 01/13/2021    CREATININE 1.0 01/13/2021    GLUCOSE 139 (H) 01/13/2021    CALCIUM 9.9 01/13/2021    PROT 6.9 01/13/2021    LABALBU 4.3 01/13/2021    BILITOT 0.8 01/13/2021    ALKPHOS 166 (H) 01/13/2021    AST 21 01/13/2021    ALT 19 01/13/2021    LABGLOM >60 01/13/2021    GFRAA >60 01/13/2021    AGRATIO 1.7 01/13/2021    GLOB 2.6 01/13/2021

## 2021-07-06 ENCOUNTER — OFFICE VISIT (OUTPATIENT)
Dept: FAMILY MEDICINE CLINIC | Age: 56
End: 2021-07-06
Payer: COMMERCIAL

## 2021-07-06 VITALS
DIASTOLIC BLOOD PRESSURE: 64 MMHG | HEART RATE: 63 BPM | WEIGHT: 278 LBS | OXYGEN SATURATION: 95 % | SYSTOLIC BLOOD PRESSURE: 102 MMHG | HEIGHT: 70 IN | BODY MASS INDEX: 39.8 KG/M2

## 2021-07-06 DIAGNOSIS — E66.9 OBESITY (BMI 30-39.9): ICD-10-CM

## 2021-07-06 DIAGNOSIS — G62.9 NEUROPATHY: ICD-10-CM

## 2021-07-06 DIAGNOSIS — G47.33 OSA (OBSTRUCTIVE SLEEP APNEA): ICD-10-CM

## 2021-07-06 DIAGNOSIS — I83.029 VENOUS STASIS ULCERS OF BOTH LOWER EXTREMITIES (HCC): ICD-10-CM

## 2021-07-06 DIAGNOSIS — Z23 NEED FOR SHINGLES VACCINE: ICD-10-CM

## 2021-07-06 DIAGNOSIS — I83.019 VENOUS STASIS ULCERS OF BOTH LOWER EXTREMITIES (HCC): ICD-10-CM

## 2021-07-06 DIAGNOSIS — E11.69 DIABETES MELLITUS TYPE 2 IN OBESE (HCC): Primary | ICD-10-CM

## 2021-07-06 DIAGNOSIS — I10 ESSENTIAL HYPERTENSION: ICD-10-CM

## 2021-07-06 DIAGNOSIS — E66.9 DIABETES MELLITUS TYPE 2 IN OBESE (HCC): Primary | ICD-10-CM

## 2021-07-06 DIAGNOSIS — L97.929 VENOUS STASIS ULCERS OF BOTH LOWER EXTREMITIES (HCC): ICD-10-CM

## 2021-07-06 DIAGNOSIS — L97.919 VENOUS STASIS ULCERS OF BOTH LOWER EXTREMITIES (HCC): ICD-10-CM

## 2021-07-06 LAB — HBA1C MFR BLD: 6.1 %

## 2021-07-06 PROCEDURE — 90471 IMMUNIZATION ADMIN: CPT | Performed by: FAMILY MEDICINE

## 2021-07-06 PROCEDURE — 99214 OFFICE O/P EST MOD 30 MIN: CPT | Performed by: FAMILY MEDICINE

## 2021-07-06 PROCEDURE — 90750 HZV VACC RECOMBINANT IM: CPT | Performed by: FAMILY MEDICINE

## 2021-07-06 PROCEDURE — 83036 HEMOGLOBIN GLYCOSYLATED A1C: CPT | Performed by: FAMILY MEDICINE

## 2021-07-06 RX ORDER — GABAPENTIN 300 MG/1
300 CAPSULE ORAL 3 TIMES DAILY
COMMUNITY
End: 2022-05-17 | Stop reason: SDUPTHER

## 2021-07-06 NOTE — PROGRESS NOTES
Adrián Lawson is a 61 y.o. male. HPI: Here from work for diabetic follow-up since his bariatric surgery he had lost a lot of weight but is starting to gain some of the back  He was all actually able to get off all his diabetic medication  But he want to make sure his sugars were drifting up  Some swelling in his lower legs and he admits he does not take his water pill like he should  Scratch his left anterior shin and there is some weeping from superficial abrasions  No chest pain or shortness of breath had the Covid vaccine  Off diabetic meds as we mentioned, still has neuropathy for which she takes gabapentin 900 mg twice daily  Meds, vitamins and allergies reviewed with pt    ROS: No TIA's or unusual headaches, no dysphagia. No prolonged cough. No dyspnea or chest pain on exertion. No abdominal pain, change in bowel habits, black or bloody stools. No urinary tract symptoms. No new or unusual musculoskeletal symptoms. Prior to Visit Medications    Medication Sig Taking? Authorizing Provider   gabapentin (NEURONTIN) 300 MG capsule Take 300 mg by mouth 3 times daily.  3 tab po bid Yes Historical Provider, MD   tamsulosin (FLOMAX) 0.4 MG capsule TAKE 1 CAPSULE DAILY Yes Celine Hollingsworth MD   verapamil (CALAN SR) 120 MG extended release tablet TAKE 1 TABLET DAILY Yes Celine Hollingsworth MD   metoprolol succinate (TOPROL XL) 25 MG extended release tablet TAKE 1 TABLET DAILY (NEED AN OFFICE VISIT FOR ADDITIONAL REFILLS) Yes Celine Hollingsworth MD   ONETOUCH ULTRA strip USE 1 STRIP  DAILY AS NEEDED Yes Celine Hollingsworth MD   hydroCHLOROthiazide (HYDRODIURIL) 25 MG tablet Take 1 tablet by mouth every morning Yes Celine Hollingsworth MD   Multiple Vitamins-Minerals (BARIATRIC FUSION) CHEW Take 3 tablets by mouth daily Yes Historical Provider, MD   naproxen (NAPROSYN) 500 MG tablet Take 1 tablet by mouth 2 times daily as needed for Pain  Frantz Malone PA-C   gabapentin (NEURONTIN) 300 MG capsule 2 po bid Sandra Osborn MD   ibuprofen (ADVIL;MOTRIN) 800 MG tablet Take 1 tablet by mouth 3 times daily (with meals)  Sandra Osborn MD       Past Medical History:   Diagnosis Date    Arthritis     Diabetes mellitus type 2 in obese ([facility]     Diabetes mellitus type 2 in obese ([facility]     Diabetes mellitus type 2 in obese ([facility]     DM type 2 (diabetes mellitus, type 2) ([facility]     HTN     HTN (hypertension)      replace inactive diagnosis    Lymphedema     Morbid obesity with BMI of 50.0-59.9, adult ([facility])     Neuropathy     feet    Neuropathy     feet    Neuropathy     feet    Obesity     HANNAH (obstructive sleep apnea) 2016    bipap    Stasis dermatitis of both legs     Tobacco abuse, continuous        Social History     Tobacco Use    Smoking status: Former Smoker     Packs/day: 1.00     Years: 43.00     Pack years: 43.00     Types: Cigarettes     Quit date: 2016     Years since quittin.2    Smokeless tobacco: Never Used    Tobacco comment: started smoking at age 14 - smoked up to 1 p.p.d    Vaping Use    Vaping Use: Never used   Substance Use Topics    Alcohol use: No     Alcohol/week: 0.0 standard drinks    Drug use: No       Family History   Problem Relation Age of Onset    Diabetes Mother     Cancer Mother     Arthritis Mother     Glaucoma Mother     Migraines Mother     Substance Abuse Father     Arthritis Father     Heart Disease Father     High Blood Pressure Father     Migraines Sister        Allergies   Allergen Reactions    Lisinopril Hives    Amoxicillin-Pot Clavulanate Diarrhea       OBJECTIVE:  /64   Pulse 63   Ht 5' 10\" (1.778 m)   Wt 278 lb (126.1 kg)   SpO2 95%   BMI 39.89 kg/m²   GEN:  in NAD obese weight down 5 pounds  NECK:  Supple without adenopathy no bruits. CV:  Regular rate and rhythm, S1 and S2 normal, no murmurs, clicks  PULM:  Chest is clear, no wheezing ,  symmetric air entry throughout both lung fields.   EXT: Bilateral venous stasis hyperpigmentation, superficial abrasions that are weeping yellow serosanguineous liquid on the left anterior shin 2+ pitting edema left 1+ pitting edema right ankles  NEURO: nonfocal  a1c - 6.1  ASSESSMENT/PLAN:  1. Diabetes mellitus type 2 in obese (HCC)  Off meds  - POCT glycosylated hemoglobin (Hb A1C)  Further weight loss  2. Essential hypertension  Stable. Low troday  Suspect mild dehydration after working outside continue current medication  3. Neuropathy  On gabapentin 900 bid    4. Obesity (BMI 30-39. 9)  Wt down 5 #  Need to lose another 15 pounds  5. HANNAH (obstructive sleep apnea)  Only partially compliant  Further weight loss  6.  Venous stasis ulcers of both lower extremities (HCC)  Stable  Compression stockings if tolerable  7 right knee OA  Has seen orhto, needs tkr someday  Nonweightbearing exercise  Further weight loss  8 imm  shingrix #1 today  Return to office in 3 months

## 2021-09-20 ENCOUNTER — TELEPHONE (OUTPATIENT)
Dept: FAMILY MEDICINE CLINIC | Age: 56
End: 2021-09-20

## 2021-09-20 NOTE — TELEPHONE ENCOUNTER
----- Message from [facility] sent at 9/20/2021  9:29 AM EDT -----  Subject: Appointment Request    Reason for Call: Urgent (Patient Request) No Script    QUESTIONS  Type of Appointment? Established Patient  Reason for appointment request? Available appointments did not meet   patient need  Additional Information for Provider? Faith Patton is experiencing painful   swelling in both legs, mostly in his left leg, his feet tingle when he   stands. He called off of work due to these symptoms and he would like to   be seen either today, tomorrow, or Wednesday because he needs a doctors   note to give to his job.  ---------------------------------------------------------------------------  --------------  Kasey Iker INFO  What is the best way for the office to contact you? OK to leave message on   voicemail  Preferred Call Back Phone Number? [phone]  ---------------------------------------------------------------------------  --------------  SCRIPT ANSWERS  Relationship to Patient? Self  (Is the patient requesting to see the provider for a procedure?)? No  (Is the patient requesting to see the provider urgently  today or   tomorrow. )? Yes  Have you been diagnosed with, awaiting test results for, or told that you   are suspected of having COVID-19 (Coronavirus)? (If patient has tested   negative or was tested as a requirement for work, school, or travel and   not based on symptoms, answer no)? No  Within the past two weeks have you developed any of the following symptoms   (answer no if symptoms have been present longer than 2 weeks or began   more than 2 weeks ago)? Fever or Chills, Cough, Shortness of breath or   difficulty breathing, Loss of taste or smell, Sore throat, Nasal   congestion, Sneezing or runny nose, Fatigue or generalized body aches   (answer no if pain is specific to a body part e.g. back pain), Diarrhea,   Headache? No  Have you had close contact with someone with COVID-19 in the last 14 days? No  (Service Expert  click yes below to proceed with [employer] As Usual   Scheduling)?  Yes

## 2021-09-21 ENCOUNTER — NURSE TRIAGE (OUTPATIENT)
Dept: OTHER | Facility: CLINIC | Age: 56
End: 2021-09-21

## 2021-09-21 NOTE — TELEPHONE ENCOUNTER
This patient has had leg swelling for years, suggest heat just give 2 or 3 days of doubling his water pill elevating his feet.   If he is not out of breath or having any chest pain he really does not need an appointment today unless swelling does not improve over the next several days

## 2021-09-21 NOTE — TELEPHONE ENCOUNTER
Patient returned call and advised of Dr. Garcia Begun instructions. He expressed understanding and will call if it does not improve in the next few days. He is requesting a note to return to work on Thursday. He needs excused for Monday, Tuesday and Wednesday. (9/20,9/21,& 9/22. Note must say that this is related to his FMLA condition. Unable to perform regular duties. Please call patient when the letter is ready and fax directly to patient. Fax # [phone].

## 2021-09-21 NOTE — TELEPHONE ENCOUNTER
Yes may compose a work excuse letter as he requests and I will sign it when I come in later this afternoon

## 2021-09-21 NOTE — TELEPHONE ENCOUNTER
Received call from Stanislaw Tay at pre-service center [facility]) Nate with Red Flag Complaint. Brief description of triage: Pt calls to report symptoms of bilateral leg swelling. States swelling started over the weekend. Rates severity as moderate (swelling up to knee). Reports he has doubled diuretic and elevated legs. Denies breathing difficulty or chest pain. Triage indicates for patient to: See today in office. Care advice provided, patient verbalizes understanding; denies any other questions or concerns; instructed to call back for any new or worsening symptoms. Writer provided warm transfer to [facility] at [facility] for appointment scheduling. Attention Provider: Thank you for allowing me to participate in the care of your patient. The patient was connected to triage in response to information provided to the ECC. Please do not respond through this encounter as the response is not directed to a shared pool. Reason for Disposition   MODERATE swelling of both ankles (e.g., swelling extends up to the knees) AND new onset or worsening    Answer Assessment - Initial Assessment Questions  1. ONSET: \"When did the swelling start? \" (e.g., minutes, hours, days)      9/18/21    2. LOCATION: \"What part of the leg is swollen? \"  \"Are both legs swollen or just one leg? \"      Bilateral     3. SEVERITY: \"How bad is the swelling? \" (e.g., localized; mild, moderate, severe)   - Localized - small area of swelling localized to one leg   - MILD pedal edema - swelling limited to foot and ankle, pitting edema < 1/4 inch (6 mm) deep, rest and elevation eliminate most or all swelling   - MODERATE edema - swelling of lower leg to knee, pitting edema > 1/4 inch (6 mm) deep, rest and elevation only partially reduce swelling   - SEVERE edema - swelling extends above knee, facial or hand swelling present       Moderate    4. REDNESS: \"Does the swelling look red or infected? \"      No    5. PAIN: \"Is the swelling painful to touch? \" If so, ask: \"How painful is it? \"   (Scale 1-10; mild, moderate or severe)     Mild    6. FEVER: \"Do you have a fever? \" If so, ask: \"What is it, how was it measured, and when did it start? \"       No    7. CAUSE: \"What do you think is causing the leg swelling? \"      Edema     8. MEDICAL HISTORY: \"Do you have a history of heart failure, kidney disease, liver failure, or cancer? \"      No    9. RECURRENT SYMPTOM: \"Have you had leg swelling before? \" If so, ask: \"When was the last time? \" \"What happened that time? \"      Yes-edema-diuretic     10. OTHER SYMPTOMS: \"Do you have any other symptoms? \" (e.g., chest pain, difficulty breathing)        No    11. PREGNANCY: \"Is there any chance you are pregnant? \" \"When was your last menstrual period? \"        NA    Protocols used: LEG SWELLING AND EDEMA-ADULT-OH

## 2021-10-12 ENCOUNTER — OFFICE VISIT (OUTPATIENT)
Dept: FAMILY MEDICINE CLINIC | Age: 56
End: 2021-10-12
Payer: COMMERCIAL

## 2021-10-12 VITALS
HEART RATE: 79 BPM | DIASTOLIC BLOOD PRESSURE: 84 MMHG | HEIGHT: 70 IN | SYSTOLIC BLOOD PRESSURE: 139 MMHG | WEIGHT: 286 LBS | OXYGEN SATURATION: 97 % | BODY MASS INDEX: 40.94 KG/M2

## 2021-10-12 DIAGNOSIS — E11.69 DIABETES MELLITUS TYPE 2 IN OBESE (HCC): Primary | ICD-10-CM

## 2021-10-12 DIAGNOSIS — E66.9 OBESITY (BMI 30-39.9): ICD-10-CM

## 2021-10-12 DIAGNOSIS — Z23 FLU VACCINE NEED: ICD-10-CM

## 2021-10-12 DIAGNOSIS — I10 ESSENTIAL HYPERTENSION: ICD-10-CM

## 2021-10-12 DIAGNOSIS — E66.9 DIABETES MELLITUS TYPE 2 IN OBESE (HCC): Primary | ICD-10-CM

## 2021-10-12 DIAGNOSIS — L97.929 VENOUS STASIS ULCERS OF BOTH LOWER EXTREMITIES (HCC): ICD-10-CM

## 2021-10-12 DIAGNOSIS — I83.019 VENOUS STASIS ULCERS OF BOTH LOWER EXTREMITIES (HCC): ICD-10-CM

## 2021-10-12 DIAGNOSIS — Z23 NEED FOR SHINGLES VACCINE: ICD-10-CM

## 2021-10-12 DIAGNOSIS — I83.029 VENOUS STASIS ULCERS OF BOTH LOWER EXTREMITIES (HCC): ICD-10-CM

## 2021-10-12 DIAGNOSIS — L97.919 VENOUS STASIS ULCERS OF BOTH LOWER EXTREMITIES (HCC): ICD-10-CM

## 2021-10-12 DIAGNOSIS — G47.33 OSA (OBSTRUCTIVE SLEEP APNEA): ICD-10-CM

## 2021-10-12 LAB — HBA1C MFR BLD: 6.5 %

## 2021-10-12 PROCEDURE — 99214 OFFICE O/P EST MOD 30 MIN: CPT | Performed by: FAMILY MEDICINE

## 2021-10-12 PROCEDURE — 90674 CCIIV4 VAC NO PRSV 0.5 ML IM: CPT | Performed by: FAMILY MEDICINE

## 2021-10-12 PROCEDURE — 83036 HEMOGLOBIN GLYCOSYLATED A1C: CPT | Performed by: FAMILY MEDICINE

## 2021-10-12 PROCEDURE — 90750 HZV VACC RECOMBINANT IM: CPT | Performed by: FAMILY MEDICINE

## 2021-10-12 PROCEDURE — 90471 IMMUNIZATION ADMIN: CPT | Performed by: FAMILY MEDICINE

## 2021-10-12 PROCEDURE — 90472 IMMUNIZATION ADMIN EACH ADD: CPT | Performed by: FAMILY MEDICINE

## 2021-10-12 RX ORDER — GABAPENTIN 300 MG/1
900 CAPSULE ORAL 2 TIMES DAILY
Qty: 540 CAPSULE | Refills: 3 | Status: SHIPPED | OUTPATIENT
Start: 2021-10-12 | End: 2022-01-10

## 2021-10-12 NOTE — PROGRESS NOTES
Katherine Ko is a 61 y.o. male. HPI: Here for complex medical visit  Right knee pain is severe may need to have a total knee replacement according to his orthopedic specialist  Still smokes a pack every week  Weight is drifting up  Not exercising much  Leg swelling is slightly worse  Meds, vitamins and allergies reviewed with pt    ROS: No TIA's or unusual headaches, no dysphagia. No prolonged cough. No dyspnea or chest pain on exertion. No abdominal pain, change in bowel habits, black or bloody stools. No urinary tract symptoms. No new or unusual musculoskeletal symptoms. Prior to Visit Medications    Medication Sig Taking? Authorizing Provider   gabapentin (NEURONTIN) 300 MG capsule Take 300 mg by mouth 3 times daily.  3 tab po bid Yes Historical Provider, MD   tamsulosin (FLOMAX) 0.4 MG capsule TAKE 1 CAPSULE DAILY Yes Geoff Gaines MD   verapamil (CALAN SR) 120 MG extended release tablet TAKE 1 TABLET DAILY Yes Geoff Gaines MD   metoprolol succinate (TOPROL XL) 25 MG extended release tablet TAKE 1 TABLET DAILY (NEED AN OFFICE VISIT FOR ADDITIONAL REFILLS) Yes Geoff Gaines MD   ONETOUCH ULTRA strip USE 1 STRIP  DAILY AS NEEDED Yes Geoff Gaines MD   hydroCHLOROthiazide (HYDRODIURIL) 25 MG tablet Take 1 tablet by mouth every morning Yes Geoff Gaines MD   Multiple Vitamins-Minerals (BARIATRIC FUSION) CHEW Take 3 tablets by mouth daily Yes Historical Provider, MD   naproxen (NAPROSYN) 500 MG tablet Take 1 tablet by mouth 2 times daily as needed for Pain  Elvis Ireland PA-C   gabapentin (NEURONTIN) 300 MG capsule 2 po bid  Geoff Gaines MD   ibuprofen (ADVIL;MOTRIN) 800 MG tablet Take 1 tablet by mouth 3 times daily (with meals)  Geoff Gaines MD       Past Medical History:   Diagnosis Date    Arthritis     Diabetes mellitus type 2 in obese ([address]     [facility] mellitus [facility] 2 in [facility]     Diabetes mellitus type 2 in obese ([address]     Diabetes mellitus type 2 in obese ([facility]     DM type 2 (diabetes mellitus, type 2) ([facility]     HTN     HTN (hypertension)      replace inactive diagnosis    Lymphedema     Morbid obesity with BMI of 50.0-59.9, adult ([facility])     Neuropathy     feet    Neuropathy     feet    Neuropathy     feet    Obesity     HANNAH (obstructive sleep apnea) 2016    bipap    Stasis dermatitis of both legs     Tobacco abuse, continuous        Social History     Tobacco Use    Smoking status: Former Smoker     Packs/day: 1.00     Years: 43.00     Pack years: 43.00     Types: Cigarettes     Quit date: 2016     Years since quittin.5    Smokeless tobacco: Never Used    Tobacco comment: started smoking at age 14 - smoked up to 1 p.p.d    Vaping Use    Vaping Use: Never used   Substance Use Topics    Alcohol use: No     Alcohol/week: 0.0 standard drinks    Drug use: No       Family History   Problem Relation Age of Onset    Diabetes Mother     Cancer Mother     Arthritis Mother     Glaucoma Mother     Migraines Mother     Substance Abuse Father     Arthritis Father     Heart Disease Father     High Blood Pressure Father     Migraines Sister        Allergies   Allergen Reactions    Lisinopril Hives    Amoxicillin-Pot Clavulanate Diarrhea       OBJECTIVE:  /84   Pulse 79   Ht 5' 10\" (1.778 m)   Wt 286 lb (129.7 kg)   SpO2 97%   BMI 41.04 kg/m²   GEN:  in NAD obese weight drifting up since his bariatric surgery    NECK:  Supple without adenopathy. CV:  Regular rate and rhythm, S1 and S2 normal, no murmurs, clicks  PULM:  Chest is clear, no wheezing ,  symmetric air entry throughout both lung fields. EXT: No rash or amount of lymphedema,  Hyperpigmentation and 1+ pitting edema  NEURO: nonfocal  a1c - 6.5  ASSESSMENT/PLAN:  1. Diabetes mellitus type 2 in obese ([facility])  stable, continue present medication  - POCT glycosylated hemoglobin (Hb A1C)  -  DIABETES FOOT EXAM    2.  Essential hypertension  stable stable continue present medication    3. Obesity (BMI 30-39. 9)  Wt up 14# since julianne  Emphasized strongly the need to turn things around to get his weight back down    4. HANNAH (obstructive sleep apnea)  Not using CPAP  See specilaist to get back on his CPAP    5. Venous stasis ulcers of both lower extremities (HCC)  Slightly worsening, more the reason to lose weight    6 right knee OA  Sees Ursula Dave, may need tkr  Getting cortisone shots    7 tob  1 pk per week  I told pt it is very important to quit smoking! When ready, I would like to schedule an appointment to discuss the various tools we can offer, such as classes, hypnosis, accupuncture, or/and medications. Suggest pt. get to a class, pick a quit date, and RTO to discuss. Multiple approaches toward motivating the patient were explored.     8 immunizations up-to-date

## 2021-10-25 ENCOUNTER — TELEPHONE (OUTPATIENT)
Dept: FAMILY MEDICINE CLINIC | Age: 56
End: 2021-10-25

## 2021-10-25 ENCOUNTER — NURSE TRIAGE (OUTPATIENT)
Dept: OTHER | Facility: CLINIC | Age: 56
End: 2021-10-25

## 2021-10-25 ENCOUNTER — OFFICE VISIT (OUTPATIENT)
Dept: FAMILY MEDICINE CLINIC | Age: 56
End: 2021-10-25
Payer: COMMERCIAL

## 2021-10-25 VITALS
BODY MASS INDEX: 40.52 KG/M2 | SYSTOLIC BLOOD PRESSURE: 175 MMHG | HEART RATE: 60 BPM | DIASTOLIC BLOOD PRESSURE: 89 MMHG | WEIGHT: 283 LBS | HEIGHT: 70 IN

## 2021-10-25 DIAGNOSIS — R60.1 GENERALIZED EDEMA: ICD-10-CM

## 2021-10-25 DIAGNOSIS — R60.1 GENERALIZED EDEMA: Primary | ICD-10-CM

## 2021-10-25 PROCEDURE — 99213 OFFICE O/P EST LOW 20 MIN: CPT | Performed by: FAMILY MEDICINE

## 2021-10-25 RX ORDER — POTASSIUM CHLORIDE 20 MEQ/1
20 TABLET, EXTENDED RELEASE ORAL DAILY
Qty: 30 TABLET | Refills: 3 | Status: SHIPPED | OUTPATIENT
Start: 2021-10-25 | End: 2022-01-06 | Stop reason: SDUPTHER

## 2021-10-25 RX ORDER — CEPHALEXIN 500 MG/1
500 CAPSULE ORAL 3 TIMES DAILY
Qty: 30 CAPSULE | Refills: 0 | Status: SHIPPED | OUTPATIENT
Start: 2021-10-25 | End: 2022-08-04

## 2021-10-25 RX ORDER — TORSEMIDE 20 MG/1
20 TABLET ORAL DAILY
Qty: 30 TABLET | Refills: 3 | Status: SHIPPED | OUTPATIENT
Start: 2021-10-25 | End: 2022-01-06 | Stop reason: SDUPTHER

## 2021-10-25 NOTE — PROGRESS NOTES
Roxanna Renee is a 61 y.o. male. HPI: Jean Pierre Griffin has had a longstanding problem and lymphedema and occasionally gets cellulitis  Last several days he has noticed both of his lower legs have been a little more swollen but especially on the left  Then just yesterday became red and warm to touch there is but no break in the skin no drainage but he is concerned about cellulitis  Denies any fever or red streaks or chills  Meds, vitamins and allergies reviewed with pt    ROS: No TIA's or unusual headaches, no dysphagia. No prolonged cough. No dyspnea or chest pain on exertion. No abdominal pain, change in bowel habits, black or bloody stools. No urinary tract symptoms. No new or unusual musculoskeletal symptoms. Prior to Visit Medications    Medication Sig Taking? Authorizing Provider   cephALEXin (KEFLEX) 500 MG capsule Take 1 capsule by mouth 3 times daily Yes Fran Goldmann, MD   torsemide (DEMADEX) 20 MG tablet Take 1 tablet by mouth daily Yes Fran Goldmann, MD   potassium chloride (KLOR-CON M) 20 MEQ extended release tablet Take 1 tablet by mouth daily Yes Fran Goldmann, MD   gabapentin (NEURONTIN) 300 MG capsule Take 3 capsules by mouth 2 times daily for 90 days. Yes Fran Goldmann, MD   gabapentin (NEURONTIN) 300 MG capsule Take 300 mg by mouth 3 times daily.  3 tab po bid Yes Historical Provider, MD   tamsulosin (FLOMAX) 0.4 MG capsule TAKE 1 CAPSULE DAILY Yes Fran Goldmann, MD   verapamil (CALAN SR) 120 MG extended release tablet TAKE 1 TABLET DAILY Yes Fran Goldmann, MD   metoprolol succinate (TOPROL XL) 25 MG extended release tablet TAKE 1 TABLET DAILY (NEED AN OFFICE VISIT FOR ADDITIONAL REFILLS) Yes Fran Goldmann, MD   ONETOUCH ULTRA strip USE 1 STRIP  DAILY AS NEEDED Yes Fran Goldmann, MD   Multiple Vitamins-Minerals (BARIATRIC FUSION) CHEW Take 3 tablets by mouth daily Yes Historical Provider, MD   naproxen (NAPROSYN) 500 MG tablet Take 1 tablet by mouth 2 times daily as needed for Pain  Scarlett Carter PA-C   gabapentin (NEURONTIN) 300 MG capsule 2 po bid  Sandee Oropeza MD   ibuprofen (ADVIL;MOTRIN) 800 MG tablet Take 1 tablet by mouth 3 times daily (with meals)  Sandee Oropeza MD       Past Medical History:   Diagnosis Date    Arthritis     Diabetes mellitus type 2 in obese ([facility]     Diabetes mellitus type 2 in obese ([facility]     Diabetes mellitus type 2 in obese ([facility]     Diabetes mellitus type 2 in obese ([facility]     DM type 2 (diabetes mellitus, type 2) ([facility]     HTN     HTN (hypertension)      replace inactive diagnosis    Lymphedema     Morbid obesity with BMI of 50.0-59.9, adult ([facility]     Neuropathy     feet    Neuropathy     feet    Neuropathy     feet    Obesity     HANNAH (obstructive sleep apnea) 2016    bipap    Stasis dermatitis of both legs     Tobacco abuse, continuous        Social History     Tobacco Use    Smoking status: Former Smoker     Packs/day: 1.00     Years: 43.00     Pack years: 43.00     Types: Cigarettes     Quit date: 2016     Years since quittin.5    Smokeless tobacco: Never Used    Tobacco comment: started smoking at age 14 - smoked up to 1 p.p.d    Vaping Use    Vaping Use: Never used   Substance Use Topics    Alcohol use: No     Alcohol/week: 0.0 standard drinks    Drug use: No       Family History   Problem Relation Age of Onset    Diabetes Mother     Cancer Mother     Arthritis Mother     Glaucoma Mother     Migraines Mother     Substance Abuse Father     Arthritis Father     Heart Disease Father     High Blood Pressure Father     Migraines Sister        Allergies   Allergen Reactions    Lisinopril Hives    Amoxicillin-Pot Clavulanate Diarrhea       OBJECTIVE:  BP (!) 175/89   Pulse 60   Ht 5' 10\" (1.778 m)   Wt 283 lb (128.4 kg)   BMI 40.61 kg/m²   GEN:  in NAD obese  CV:  Regular rate and rhythm, S1 and S2 normal, no murmurs, clicks  PULM:  Chest is clear, no wheezing ,  symmetric air entry throughout both lung fields. EXT: Both ankles and calves are slightly swollen the left more so than the right the left is warm to touch slightly red in the mid calf area no streaks no open wounds seen  NEURO: nonfocal    ASSESSMENT/PLAN:  1. Generalized edema    - torsemide (DEMADEX) 20 MG tablet; Take 1 tablet by mouth daily  Dispense: 30 tablet; Refill: 3  - potassium chloride (KLOR-CON M) 20 MEQ extended release tablet; Take 1 tablet by mouth daily  Dispense: 30 tablet;  Refill: 3    2LLExt cellulitis - keflex  Warning signs    3htn - torsemide and kcl    OOW 10/25 - 10/27  rto12/28

## 2021-10-25 NOTE — TELEPHONE ENCOUNTER
Reason for Disposition   MODERATE swelling of both ankles (e.g., swelling extends up to the knees) AND new onset or worsening    Answer Assessment - Initial Assessment Questions  1. ONSET: \"When did the swelling start? \" (e.g., minutes, hours, days)      States has leg swelling all the time, left always worse than right. Yesterday left pt noticed left calf more red and slightly warm to the touch    2. LOCATION: \"What part of the leg is swollen? \"  \"Are both legs swollen or just one leg? \"      Bilateral, left always worse than right. 3. SEVERITY: \"How bad is the swelling? \" (e.g., localized; mild, moderate, severe)   - Localized - small area of swelling localized to one leg   - MILD pedal edema - swelling limited to foot and ankle, pitting edema < 1/4 inch (6 mm) deep, rest and elevation eliminate most or all swelling   - MODERATE edema - swelling of lower leg to knee, pitting edema > 1/4 inch (6 mm) deep, rest and elevation only partially reduce swelling   - SEVERE edema - swelling extends above knee, facial or hand swelling present       Left always more swollen than right, swelling extends to knees    4. REDNESS: \"Does the swelling look red or infected? \"      Back of calf and toes are more red    5. PAIN: \"Is the swelling painful to touch? \" If so, ask: \"How painful is it? \"   (Scale 1-10; mild, moderate or severe)      Mild pain to the touch    6. FEVER: \"Do you have a fever? \" If so, ask: \"What is it, how was it measured, and when did it start? \"       Denies    7. CAUSE: \"What do you think is causing the leg swelling? \"      History of cellulitis- states started this way    8. MEDICAL HISTORY: \"Do you have a history of heart failure, kidney disease, liver failure, or cancer?\"        9. RECURRENT SYMPTOM: \"Have you had leg swelling before? \" If so, ask: \"When was the last time? \" \"What happened that time? \"      Yes- symptoms began like this and then progressively worsened, had cellulitis, was hospitalized for it.    10. OTHER SYMPTOMS: \"Do you have any other symptoms? \" (e.g., chest pain, difficulty breathing)        Headache    11. PREGNANCY: \"Is there any chance you are pregnant? \" \"When was your last menstrual period? \"        n/a    Protocols used: LEG SWELLING AND EDEMA-ADULT-OH    Received call from [address] [place] [address] at [facility] with Red Flag Complaint. Brief description of triage: Patient with chronic bilateral leg swelling, left worse than right, now as redness on back of left calf, toes, calf is warmer to the touch than right, and is mildly painful to the touch. States symptoms similar to when pt had cellulitis. Triage indicates for patient to See today/[facility] as back up    Care advice provided, patient verbalizes understanding; denies any other questions or concerns; instructed to call back for any new or worsening symptoms. Writer provided warm transfer to [facility] at [facility] for appointment scheduling. Attention Provider: Thank you for allowing me to participate in the care of your patient. The patient was connected to triage in response to information provided to the ECC/PSC. Please do not respond through this encounter as the response is not directed to a shared pool.

## 2021-10-25 NOTE — TELEPHONE ENCOUNTER
Called pharmacy and spoke with Faby Eric about patients Rx. The pharmacy has all the Rx and will be ready tonight.   Called patient and let him know and no questions

## 2021-10-25 NOTE — TELEPHONE ENCOUNTER
----- Message from Yoly Miranda sent at 10/25/2021  5:40 PM EDT -----  Subject: Medication Problem    QUESTIONS  Name of Medication? potassium chloride (KLOR-CON M) 20 MEQ extended   release tablet  Patient-reported dosage and instructions? not picked up  What question or problem do you have with the medication? pharmacy doesn't   have this ordered, possible error, patient going to try again to check  Preferred Pharmacy? [facility] [address] [place] [address] [phone]  Pharmacy phone number (if available)? [phone]  Additional Information for Provider?   ---------------------------------------------------------------------------  --------------  CALL BACK INFO  What is the best way for the office to contact you? OK to leave message on   voicemail  Preferred Call Back Phone Number? [phone]  ---------------------------------------------------------------------------  --------------  SCRIPT ANSWERS  Relationship to Patient?  Self

## 2021-10-27 ENCOUNTER — TELEPHONE (OUTPATIENT)
Dept: FAMILY MEDICINE CLINIC | Age: 56
End: 2021-10-27

## 2021-10-27 NOTE — LETTER
[address]  Phone: [phone]  Fax: [phone]    Krunal Alford MD        October 27, 2021     Patient: Az Perry   YOB: 1965   Date of Visit: 10/27/2021       To Whom it May Concern:    Az Perry was seen in my clinic on 10/27/2021. He may return to work on 11/01/21. If you have any questions or concerns, please don't hesitate to call.     Sincerely,         Krunal Alford MD

## 2021-10-27 NOTE — TELEPHONE ENCOUNTER
The patient calls in and states he still has swelling in his left leg. Patient was seen by Dr. Sherryle Alberts on 10/25/21. The patient is requesting his return to work letter be extended to 11/01/21. Patient would like to be notified when letter is ready.

## 2021-12-09 ENCOUNTER — OFFICE VISIT (OUTPATIENT)
Dept: FAMILY MEDICINE CLINIC | Age: 56
End: 2021-12-09
Payer: COMMERCIAL

## 2021-12-09 VITALS
HEART RATE: 91 BPM | BODY MASS INDEX: 40.09 KG/M2 | DIASTOLIC BLOOD PRESSURE: 83 MMHG | HEIGHT: 70 IN | OXYGEN SATURATION: 97 % | SYSTOLIC BLOOD PRESSURE: 140 MMHG | WEIGHT: 280 LBS

## 2021-12-09 DIAGNOSIS — L02.91 ABSCESS: Primary | ICD-10-CM

## 2021-12-09 PROCEDURE — 10160 PNXR ASPIR ABSC HMTMA BULLA: CPT | Performed by: FAMILY MEDICINE

## 2021-12-09 PROCEDURE — 99213 OFFICE O/P EST LOW 20 MIN: CPT | Performed by: FAMILY MEDICINE

## 2021-12-09 RX ORDER — CEPHALEXIN 500 MG/1
500 CAPSULE ORAL 4 TIMES DAILY
Qty: 30 CAPSULE | Refills: 0 | Status: SHIPPED | OUTPATIENT
Start: 2021-12-09 | End: 2022-08-04

## 2021-12-09 NOTE — PROGRESS NOTES
Puneet Chiu is a 61 y.o. male. HPI:  3-day history of painful enlarging cyst on the back of his neck  No fever no chills no spontaneous drainage    Wt Readings from Last 3 Encounters:   12/09/21 280 lb (127 kg)   10/25/21 283 lb (128.4 kg)   10/12/21 286 lb (129.7 kg)     Meds, vitamins and allergies reviewed with Patient    ROS:  Gen: No fever  HEENT: No cold symptoms, no sore throat. CV:  Denies chest pain or palpitations. Pulm:  Denies shortness of breath, cough. Abd:  Denies abdominal pain, nausea and vomiting. Skin: no rash    Allergies   Allergen Reactions    Lisinopril Hives    Amoxicillin-Pot Clavulanate Diarrhea       Prior to Visit Medications    Medication Sig Taking? Authorizing Provider   cephALEXin (KEFLEX) 500 MG capsule Take 1 capsule by mouth 4 times daily Yes Lori John MD   cephALEXin (KEFLEX) 500 MG capsule Take 1 capsule by mouth 3 times daily Yes Lori John MD   torsemide (DEMADEX) 20 MG tablet Take 1 tablet by mouth daily Yes Lori John MD   potassium chloride (KLOR-CON M) 20 MEQ extended release tablet Take 1 tablet by mouth daily Yes Lori John MD   gabapentin (NEURONTIN) 300 MG capsule Take 3 capsules by mouth 2 times daily for 90 days. Yes Lori John MD   gabapentin (NEURONTIN) 300 MG capsule Take 300 mg by mouth 3 times daily.  3 tab po bid Yes Historical Provider, MD   tamsulosin (FLOMAX) 0.4 MG capsule TAKE 1 CAPSULE DAILY Yes Lori John MD   verapamil (CALAN SR) 120 MG extended release tablet TAKE 1 TABLET DAILY Yes Lori John MD   metoprolol succinate (TOPROL XL) 25 MG extended release tablet TAKE 1 TABLET DAILY (NEED AN OFFICE VISIT FOR ADDITIONAL REFILLS) Yes Lori John MD   ONETOUCH ULTRA strip USE 1 STRIP  DAILY AS NEEDED Yes Lori John MD   Multiple Vitamins-Minerals (BARIATRIC FUSION) CHEW Take 3 tablets by mouth daily Yes Historical Provider, MD   naproxen (NAPROSYN) 500 MG tablet Take 1 tablet by mouth 2 times daily as needed for Pain  Samuel Llanos PA-C   gabapentin (NEURONTIN) 300 MG capsule 2 po bid  Cinthia Acosta MD   ibuprofen (ADVIL;MOTRIN) 800 MG tablet Take 1 tablet by mouth 3 times daily (with meals)  Cinthia Acosta MD       OBJECTIVE:  BP (!) 140/83   Pulse 91   Ht 5' 10\" (1.778 m)   Wt 280 lb (127 kg)   SpO2 97%   BMI 40.18 kg/m²   GEN:  in NAD, obese weight stable   NECK:  Supple without adenopathy. Large 2 x 2 x 2 cm subcutaneous nodule left base of neck  Procedure: Sterile prep  0.2 cc of lidocaine with epi, quarter inch incision made, half a teaspoon of pus expressed  Cyst Pryde decompressed by 40%, dry sterile dressing applied  CV:  Regular rate and rhythm, S1 and S2 normal, no murmurs, clicks  PULM:  Chest is clear, no wheezing ,  symmetric air entry throughout both lung fields.   ABD: Soft, NT  NEURO: Alert and oriented ×3    ASSESSMENT/PLAN:  Abscess/infected sebaceous cyst left posterior neck  Status post I&D  Warm compresses 3 times a day  Keflex 500 3 times daily  Warning signs given

## 2022-01-06 DIAGNOSIS — R60.1 GENERALIZED EDEMA: ICD-10-CM

## 2022-01-06 RX ORDER — POTASSIUM CHLORIDE 20 MEQ/1
20 TABLET, EXTENDED RELEASE ORAL DAILY
Qty: 90 TABLET | Refills: 2 | Status: SHIPPED | OUTPATIENT
Start: 2022-01-06

## 2022-01-06 RX ORDER — TORSEMIDE 20 MG/1
20 TABLET ORAL DAILY
Qty: 30 TABLET | Refills: 3 | Status: SHIPPED | OUTPATIENT
Start: 2022-01-06

## 2022-01-06 NOTE — TELEPHONE ENCOUNTER
Medication and Quantity requested: torsemide (DEMADEX) 20 MG tablet-QTY.  New 90 day supply  AND  potassium chloride (KLOR-CON M) 20 MEQ extended release tablet      Last Visit  12/9/21    Pharmacy and phone number updated in EPIC:  yes  [facility]

## 2022-02-24 DIAGNOSIS — E11.69 DIABETES MELLITUS TYPE 2 IN OBESE (HCC): ICD-10-CM

## 2022-02-24 DIAGNOSIS — E66.9 DIABETES MELLITUS TYPE 2 IN OBESE (HCC): ICD-10-CM

## 2022-02-24 RX ORDER — BLOOD SUGAR DIAGNOSTIC
STRIP MISCELLANEOUS
Qty: 100 STRIP | Refills: 3 | Status: SHIPPED | OUTPATIENT
Start: 2022-02-24

## 2022-02-24 NOTE — TELEPHONE ENCOUNTER
Medication:   Requested Prescriptions     Pending Prescriptions Disp Refills    ONETOUCH ULTRA strip [Pharmacy Med Name: ONE TOUCH ULTRA STRIPS 100'S] 100 strip 3     Sig: USE 1 STRIP  DAILY AS NEEDED       Last Filled: 3/1/2021    Patient Phone Number: [phone] (home) [phone] (work)    Last appt: 12/9/2021   Next appt: Visit date not found    Last Labs DM:   Lab Results   Component Value Date    LABA1C 6.5 10/12/2021

## 2022-05-09 ENCOUNTER — TELEPHONE (OUTPATIENT)
Dept: ADMINISTRATIVE | Age: 57
End: 2022-05-09

## 2022-05-09 NOTE — TELEPHONE ENCOUNTER
Pt states [facility] will not refill gabapentin until the end of June. Pt states the pharmacy stated that we needed to call them with another POA, not quite sure what that means.

## 2022-05-09 NOTE — TELEPHONE ENCOUNTER
Medication and Quantity requested: gabapentin (NEURONTIN) 300 MG capsule          Last Visit  12/9/2021    Pharmacy and phone number updated in EPIC:  Yes    [facility]

## 2022-05-11 ENCOUNTER — TELEPHONE (OUTPATIENT)
Dept: ADMINISTRATIVE | Age: 57
End: 2022-05-11

## 2022-05-17 ENCOUNTER — TELEPHONE (OUTPATIENT)
Dept: FAMILY MEDICINE CLINIC | Age: 57
End: 2022-05-17

## 2022-05-17 RX ORDER — GABAPENTIN 300 MG/1
300 CAPSULE ORAL 3 TIMES DAILY
Qty: 90 CAPSULE | Refills: 0 | Status: SHIPPED | OUTPATIENT
Start: 2022-05-17 | End: 2022-06-17

## 2022-05-17 NOTE — TELEPHONE ENCOUNTER
The patient calls in to request an [address]     The patient would like a 10 day EMERGENCY Supply of    gabapentin (NEURONTIN) 300 MG capsule      [facility] #[id number]  [id number] [address] [place] [address]    The patient states that this must be labeled EMERGENCY SHORT TERM SUPPLY    The patient is completely out of the medication. The patient also notes that he thinks he has resolved the issue with his gabapentin and [facility].

## 2022-05-20 ENCOUNTER — TELEPHONE (OUTPATIENT)
Dept: FAMILY MEDICINE CLINIC | Age: 57
End: 2022-05-20

## 2022-06-24 DIAGNOSIS — R39.198 DECREASED URINE STREAM: ICD-10-CM

## 2022-06-24 RX ORDER — TAMSULOSIN HYDROCHLORIDE 0.4 MG/1
CAPSULE ORAL
Qty: 90 CAPSULE | Refills: 3 | Status: SHIPPED | OUTPATIENT
Start: 2022-06-24

## 2022-06-24 NOTE — TELEPHONE ENCOUNTER
Pharmacy called in needing clarification on     gabapentin (NEURONTIN) 300 MG capsule    They have 2 different directions on file and needs to know which one to process       [address] [place], [place] [id number]  Pharmacy Number: [phone]

## 2022-06-24 NOTE — TELEPHONE ENCOUNTER
Medication:   Requested Prescriptions     Pending Prescriptions Disp Refills    tamsulosin (FLOMAX) 0.4 MG capsule [Pharmacy Med Name: TAMSULOSIN HCL CAPS 0.4MG] 90 capsule 3     Sig: TAKE 1 CAPSULE DAILY     Last Filled:  6/29/21    Last appt: 12/9/2021   Next appt: Visit date not found    Last Lipid:   Lab Results   Component Value Date    CHOL 137 03/19/2020    TRIG 95 03/19/2020    HDL 52 01/13/2021    HDL 43 08/18/2011    LDLCALC 69 01/13/2021

## 2022-06-27 RX ORDER — GABAPENTIN 300 MG/1
CAPSULE ORAL
Qty: 90 CAPSULE | Refills: 0 | Status: CANCELLED | OUTPATIENT
Start: 2022-06-27 | End: 2022-07-27

## 2022-07-22 RX ORDER — METOPROLOL SUCCINATE 25 MG/1
TABLET, EXTENDED RELEASE ORAL
Qty: 90 TABLET | Refills: 1 | Status: SHIPPED | OUTPATIENT
Start: 2022-07-22

## 2022-07-22 NOTE — TELEPHONE ENCOUNTER
Lov 12/9/21  Lrf  90 3 6/29/21  Lab Results   Component Value Date     01/13/2021    K 5.3 (H) 01/13/2021     01/13/2021    CO2 27 01/13/2021    BUN 16 01/13/2021    CREATININE 1.0 01/13/2021    GLUCOSE 139 (H) 01/13/2021    CALCIUM 9.9 01/13/2021    PROT 6.9 01/13/2021    LABALBU 4.3 01/13/2021    BILITOT 0.8 01/13/2021    ALKPHOS 166 (H) 01/13/2021    AST 21 01/13/2021    ALT 19 01/13/2021    LABGLOM >60 01/13/2021    GFRAA >60 01/13/2021    AGRATIO 1.7 01/13/2021    GLOB 2.6 01/13/2021

## 2022-08-04 ENCOUNTER — OFFICE VISIT (OUTPATIENT)
Dept: FAMILY MEDICINE CLINIC | Age: 57
End: 2022-08-04
Payer: COMMERCIAL

## 2022-08-04 VITALS
HEART RATE: 69 BPM | BODY MASS INDEX: 39.14 KG/M2 | SYSTOLIC BLOOD PRESSURE: 142 MMHG | DIASTOLIC BLOOD PRESSURE: 84 MMHG | HEIGHT: 70 IN | WEIGHT: 273.38 LBS

## 2022-08-04 DIAGNOSIS — L03.032 PARONYCHIA OF FOURTH TOE OF LEFT FOOT: ICD-10-CM

## 2022-08-04 DIAGNOSIS — S91.209D AVULSION OF TOENAIL, SUBSEQUENT ENCOUNTER: Primary | ICD-10-CM

## 2022-08-04 PROCEDURE — 99213 OFFICE O/P EST LOW 20 MIN: CPT | Performed by: FAMILY MEDICINE

## 2022-08-04 SDOH — ECONOMIC STABILITY: FOOD INSECURITY: WITHIN THE PAST 12 MONTHS, YOU WORRIED THAT YOUR FOOD WOULD RUN OUT BEFORE YOU GOT MONEY TO BUY MORE.: NEVER TRUE

## 2022-08-04 SDOH — ECONOMIC STABILITY: FOOD INSECURITY: WITHIN THE PAST 12 MONTHS, THE FOOD YOU BOUGHT JUST DIDN'T LAST AND YOU DIDN'T HAVE MONEY TO GET MORE.: NEVER TRUE

## 2022-08-04 ASSESSMENT — SOCIAL DETERMINANTS OF HEALTH (SDOH): HOW HARD IS IT FOR YOU TO PAY FOR THE VERY BASICS LIKE FOOD, HOUSING, MEDICAL CARE, AND HEATING?: NOT HARD AT ALL

## 2022-08-04 ASSESSMENT — PATIENT HEALTH QUESTIONNAIRE - PHQ9
2. FEELING DOWN, DEPRESSED OR HOPELESS: 0
SUM OF ALL RESPONSES TO PHQ QUESTIONS 1-9: 0
1. LITTLE INTEREST OR PLEASURE IN DOING THINGS: 0
SUM OF ALL RESPONSES TO PHQ9 QUESTIONS 1 & 2: 0
SUM OF ALL RESPONSES TO PHQ QUESTIONS 1-9: 0

## 2022-08-04 NOTE — LETTER
[address] [address]  Phone: [phone]  Fax: [phone]    Patient: Az Perry  YOB: 1965  Encounter Date: 8/4/2022      Please excuse Mary Koroma from work on 8/3/22. He may return to work on 8/4/22 with the following restrictions:    -Sit down work only    Restrictions are in effect until 8/8/22, at which point he may resume work without restriction. Please call my office if you have any questions.     Sincerely,    David Kaplan MD

## 2022-08-04 NOTE — PROGRESS NOTES
[address] Note    Date: 8/4/2022                                               Russell Gannon:     Chief Complaint   Patient presents with    Toe Pain     Left foot         HPI  2 days ago pt was walking around shopping and noticed pain in his 4th toe. When he got home he looked at the foot and noticed the nail was partially hanging off so he pulled it off and it bled a little. Went to [facility] yesterday and had an x-ray that was negative for fracture. Was prescribed doxycycline and Cipro for paronychia. Pain is getting little better.          Patient Active Problem List    Diagnosis Date Noted    Neuropathy     Elevated bilirubin 06/29/2017    Obesity (BMI 30-39.9) 06/14/2017    S/P laparoscopic sleeve gastrectomy 12/28/2016    Ventricular tachycardia ([address] 09/28/2016    Venous stasis ulcers of both lower extremities (HCC)     HANNAH (obstructive sleep apnea) 04/19/2016    Vitamin D deficiency 12/23/2015    Lymphedema     Stasis dermatitis 12/05/2013    Essential hypertension 07/08/2013    Tobacco use disorder, continuous     Diabetes mellitus type 2 in obese [facility])        Past Medical History:   Diagnosis Date    Arthritis     Diabetes mellitus type 2 in obese ([address]     Diabetes mellitus type 2 in obese ([address]     Diabetes mellitus type 2 in obese ([address]     Diabetes mellitus type 2 in obese ([facility]     DM type 2 (diabetes mellitus, type 2) ([address]     HTN     HTN (hypertension)      replace inactive diagnosis    Lymphedema     Morbid obesity with BMI of 50.0-59.9, adult (HCC)     Neuropathy     feet    Neuropathy     feet    Neuropathy     feet    Obesity     HANNAH (obstructive sleep apnea) 04/19/2016    bipap    Stasis dermatitis of both legs     Tobacco abuse, continuous        Current Outpatient Medications   Medication Sig Dispense Refill    metoprolol succinate (TOPROL XL) 25 MG extended release tablet TAKE 1 TABLET DAILY (NEED AN OFFICE VISIT FOR ADDITIONAL REFILLS) 90 tablet 1    tamsulosin (FLOMAX) 0.4 MG capsule TAKE 1 CAPSULE DAILY 90 capsule 3    ONETOUCH ULTRA strip USE 1 STRIP  DAILY AS NEEDED 100 strip 3    torsemide (DEMADEX) 20 MG tablet Take 1 tablet by mouth daily 30 tablet 3    potassium chloride (KLOR-CON M) 20 MEQ extended release tablet Take 1 tablet by mouth daily 90 tablet 2    verapamil (CALAN SR) 120 MG extended release tablet TAKE 1 TABLET DAILY 90 tablet 3    Multiple Vitamins-Minerals (BARIATRIC FUSION) CHEW Take 3 tablets by mouth daily      gabapentin (NEURONTIN) 300 MG capsule Take 1 capsule by mouth 3 times daily for 31 days. 3 tab po bid 90 capsule 0    gabapentin (NEURONTIN) 300 MG capsule Take 3 capsules by mouth 2 times daily for 90 days. 540 capsule 3    naproxen (NAPROSYN) 500 MG tablet Take 1 tablet by mouth 2 times daily as needed for Pain 20 tablet 0    gabapentin (NEURONTIN) 300 MG capsule 2 po bid 360 capsule 3     No current facility-administered medications for this visit. Allergies   Allergen Reactions    Lisinopril Hives    Amoxicillin-Pot Clavulanate Diarrhea       Review of Systems  No fever, no cough    Vitals:  BP (!) 142/84   Pulse 69   Ht 5' 10\" (1.778 m)   Wt 273 lb 6 oz (124 kg)   BMI 39.23 kg/m²     Wt Readings from Last 3 Encounters:   08/04/22 273 lb 6 oz (124 kg)   12/09/21 280 lb (127 kg)   10/25/21 283 lb (128.4 kg)        Physical Exam  General:  Well-appearing, NAD, alert, non-toxic  HEENT:  Normocephalic, atraumatic. CHEST/LUNGS: No dyspnea  EXTREMETIES: + Completely avulsed toenail of left fourth toe.  + Granulation tissue over the nailbed.  + Mild erythema surrounding the nailbed. PSYCH:  A+O x 3; normal affect  NEURO:  GCS 15, CN2-12 grossly intact, no focal motor/sensory deficits, normal gait, normal speech      Assessment/Plan     54-year-old male with avulsion of L4 toenail and likely paronychia of the L4 toe. Appears to be improving. Recommend finishing antibiotics. Daily wound care advised.   Light duty at work until August 8.    Discussed with patient medication/s dosage, instructions, potential S/E, A/R and Drug Interaction  Tylenol or Motrin as needed for pain or fever  Advise to return here if worse or go to nearest ER  Encourage fluids  Pt discharged in stable condition at 10:42      1. Avulsion of toenail, subsequent encounter  2. Paronychia of fourth toe of left foot     No orders of the defined types were placed in this encounter. No follow-ups on file.     Inés Burns MD, MD    8/4/2022  10:44 AM

## 2022-10-10 NOTE — TELEPHONE ENCOUNTER
Medication:   Requested Prescriptions     Pending Prescriptions Disp Refills    verapamil (CALAN SR) 120 MG extended release tablet [Pharmacy Med Name: VERAPAMIL HCL ER TABS 120MG] 90 tablet 3     Sig: TAKE 1 TABLET DAILY        Last Filled:  6/29/2021    Patient Phone Number: [phone] (home) [phone] (work)    Last appt: 8/4/2022   Next appt: Visit date not found    Last OARRS: No flowsheet data found.

## 2022-12-15 RX ORDER — GABAPENTIN 300 MG/1
CAPSULE ORAL
Qty: 90 CAPSULE | Refills: 3 | Status: SHIPPED | OUTPATIENT
Start: 2022-12-15 | End: 2023-01-15

## 2022-12-28 ENCOUNTER — OFFICE VISIT (OUTPATIENT)
Dept: FAMILY MEDICINE CLINIC | Age: 57
End: 2022-12-28
Payer: COMMERCIAL

## 2022-12-28 VITALS
DIASTOLIC BLOOD PRESSURE: 80 MMHG | BODY MASS INDEX: 40.32 KG/M2 | OXYGEN SATURATION: 97 % | WEIGHT: 281 LBS | SYSTOLIC BLOOD PRESSURE: 122 MMHG | HEART RATE: 69 BPM

## 2022-12-28 DIAGNOSIS — Z01.811 PRE-OP CHEST EXAM: Primary | ICD-10-CM

## 2022-12-28 DIAGNOSIS — I83.019 VENOUS STASIS ULCERS OF BOTH LOWER EXTREMITIES (HCC): ICD-10-CM

## 2022-12-28 DIAGNOSIS — I10 ESSENTIAL HYPERTENSION: ICD-10-CM

## 2022-12-28 DIAGNOSIS — L97.919 VENOUS STASIS ULCERS OF BOTH LOWER EXTREMITIES (HCC): ICD-10-CM

## 2022-12-28 DIAGNOSIS — Z23 NEED FOR VACCINATION: ICD-10-CM

## 2022-12-28 DIAGNOSIS — L97.929 VENOUS STASIS ULCERS OF BOTH LOWER EXTREMITIES (HCC): ICD-10-CM

## 2022-12-28 DIAGNOSIS — I89.0 LYMPHEDEMA: ICD-10-CM

## 2022-12-28 DIAGNOSIS — E66.9 OBESITY (BMI 30-39.9): ICD-10-CM

## 2022-12-28 DIAGNOSIS — G47.33 OSA (OBSTRUCTIVE SLEEP APNEA): ICD-10-CM

## 2022-12-28 DIAGNOSIS — I83.029 VENOUS STASIS ULCERS OF BOTH LOWER EXTREMITIES (HCC): ICD-10-CM

## 2022-12-28 DIAGNOSIS — F17.209 TOBACCO USE DISORDER, CONTINUOUS: ICD-10-CM

## 2022-12-28 DIAGNOSIS — E11.69 DIABETES MELLITUS TYPE 2 IN OBESE (HCC): ICD-10-CM

## 2022-12-28 DIAGNOSIS — E66.9 DIABETES MELLITUS TYPE 2 IN OBESE (HCC): ICD-10-CM

## 2022-12-28 PROCEDURE — 99214 OFFICE O/P EST MOD 30 MIN: CPT | Performed by: FAMILY MEDICINE

## 2022-12-28 PROCEDURE — 90471 IMMUNIZATION ADMIN: CPT | Performed by: FAMILY MEDICINE

## 2022-12-28 PROCEDURE — 3078F DIAST BP <80 MM HG: CPT | Performed by: FAMILY MEDICINE

## 2022-12-28 PROCEDURE — 3074F SYST BP LT 130 MM HG: CPT | Performed by: FAMILY MEDICINE

## 2022-12-28 PROCEDURE — 90674 CCIIV4 VAC NO PRSV 0.5 ML IM: CPT | Performed by: FAMILY MEDICINE

## 2022-12-28 NOTE — PROGRESS NOTES
Chief Complaint:     Raz Hammonds is a 61 y.o. male who presents for a preoperative physical examination. He is scheduled to have RTKR done by Dr. Mely Hsieh at 1-9-[facility] on [facility].     History of Present Illness:      Right knee pain, failed conservative treatment    Past Medical History:   Diagnosis Date    Arthritis     Diabetes mellitus type 2 in obese (HCC)     Diabetes mellitus type 2 in obese (HCC)     Diabetes mellitus type 2 in obese ([facility]     Diabetes mellitus type 2 in obese ([facility]     DM type 2 (diabetes mellitus, type 2) ([facility]     HTN     HTN (hypertension)      replace inactive diagnosis    Lymphedema     Morbid obesity with BMI of 50.0-59.9, adult ([facility]     Neuropathy     feet    Neuropathy     feet    Neuropathy     feet    Obesity     HANNAH (obstructive sleep apnea) 04/19/2016    bipap    Stasis dermatitis of both legs     Tobacco abuse, continuous         Review of patient's past surgical history indicates:     Past Surgical History:   Procedure Laterality Date    APPENDECTOMY      COLONOSCOPY      KNEE ARTHROSCOPY      OTHER SURGICAL HISTORY  12/14/2016    LAPAROSCOPIC SLEEVE GASTRECTOMY           UPPER GASTROINTESTINAL ENDOSCOPY  1/15/16                                                   Current Outpatient Medications   Medication Sig Dispense Refill    gabapentin (NEURONTIN) 300 MG capsule TAKE 3 CAPSULES TWICE A DAY 90 capsule 3    verapamil (CALAN SR) 120 MG extended release tablet TAKE 1 TABLET DAILY 90 tablet 3    metoprolol succinate (TOPROL XL) 25 MG extended release tablet TAKE 1 TABLET DAILY (NEED AN OFFICE VISIT FOR ADDITIONAL REFILLS) 90 tablet 1    tamsulosin (FLOMAX) 0.4 MG capsule TAKE 1 CAPSULE DAILY 90 capsule 3    ONETOUCH ULTRA strip USE 1 STRIP  DAILY AS NEEDED 100 strip 3    torsemide (DEMADEX) 20 MG tablet Take 1 tablet by mouth daily 30 tablet 3    potassium chloride (KLOR-CON M) 20 MEQ extended release tablet Take 1 tablet by mouth daily 90 tablet 2    Multiple Vitamins-Minerals (BARIATRIC FUSION) CHEW Take 3 tablets by mouth daily      gabapentin (NEURONTIN) 300 MG capsule Take 1 capsule by mouth 3 times daily for 31 days. 3 tab po bid 90 capsule 0    naproxen (NAPROSYN) 500 MG tablet Take 1 tablet by mouth 2 times daily as needed for Pain 20 tablet 0     No current facility-administered medications for this visit. Allergies   Allergen Reactions    Lisinopril Hives    Amoxicillin-Pot Clavulanate Diarrhea       Social History     Tobacco Use    Smoking status: Former     Packs/day: 1.00     Years: 43.00     Pack years: 43.00     Types: Cigarettes     Quit date: 2016     Years since quittin.7    Smokeless tobacco: Never    Tobacco comments:     started smoking at age 14 - smoked up to 1 p.p.d    Vaping Use    Vaping Use: Never used   Substance Use Topics    Alcohol use: No     Alcohol/week: 0.0 standard drinks    Drug use: No        Family History   Problem Relation Age of Onset    Diabetes Mother     Cancer Mother     Arthritis Mother     Glaucoma Mother     Migraines Mother     Substance Abuse Father     Arthritis Father     Heart Disease Father     High Blood Pressure Father     Migraines Sister         Review Of Systems    Skin: no abnormal pigmentation, rash, scaling, itching, masses, hair or nail changes  Eyes: negative  Ears/Nose/Throat: negative  Respiratory: minor uri starting ? Cardiovascular: negative  Gastrointestinal: negative  Genitourinary: negative  Musculoskeletal: negative  Neurologic: negative  Psychiatric: negative  Hematologic/Lymphatic/Immunologic: negative  Endocrine: negative    PHYSICAL EXAMINATION:  /80   Pulse 69   Wt 281 lb (127.5 kg)   SpO2 97%   BMI 40.32 kg/m²   General appearance - healthy, alert, no distress, obese but down 5 #  Skin - Skin color, texture, turgor normal. No rashes or lesions. Head - Normocephalic. No masses, lesions, tenderness or abnormalities  Eyes - conjunctivae/corneas clear. PERRL, EOM's intact.   Ears - External ears normal. Canals clear. TM's normal. Hearing grossly intact to finger rub. Nose/Sinuses - Nares normal. Septum midline. Mucosa normal. No drainage or sinus tenderness. Oropharynx - Lips, mucosa, and tongue normal. Teeth and gums normal. Orop  Neck - Neck supple. No adenopathy. Thyroid symmetric, normal size. No bruits. Back - Back symmetric, no curvature. ROM normal. No CVA tenderness. Lungs - Percussion normal. Good diaphragmatic excursion. Lungs clear  Heart - Regular rate and rhythm, with no rub, murmur or gallop noted. Abdomen - Abdomen soft, non-tender. BS normal. No masses, organomegaly  Extremities - Extremities normal. No deformities, edema,mild chronic skin discoloration  Bilat knee crepitus  Musculoskeletal - Spine ROM normal. Muscular strength intact. Peripheral pulses - radial=4/4, dorsalis pedis=4/4,  Neuro - Gait normal. Reflexes normal and symmetric. Sensation grossly normal.  No focal weakness, limp    Hemoglobin A1C   Date Value Ref Range Status   10/12/2021 6.5 % Final      ASSESSMENT:  Encounter Diagnoses   Name Primary? Pre-op chest exam Yes    Diabetes mellitus type 2 in obese (HCC)     Venous stasis ulcers of both lower extremities (HCC)     Essential hypertension     Lymphedema     HANNHA (obstructive sleep apnea)     Obesity (BMI 30-39. 9)     Tobacco use disorder, continuous       Per encounter diagnoses  He is medically cleared for surgery and anesthesia. If  preo labs nad ekg okaqy  Plan:  Avoid nsaids for 7 days  Quit tob! Per operating surgeon. See also orders filed with this encounter, if any.    Urge covid at pharm  Flu shot today

## 2022-12-29 LAB
BASOPHILS ABSOLUTE: 0.1 10*3/UL (ref 0–0.2)
BASOPHILS RELATIVE PERCENT: 0.9 %
CHOLESTEROL, TOTAL: 169 MG/DL (ref 125–199)
CHOLESTEROL/HDL RATIO: 4
EOSINOPHILS ABSOLUTE: 0.3 10*3/UL (ref 0–0.5)
EOSINOPHILS RELATIVE PERCENT: 5.6 %
GRANULOCYTE ABSOLUTE COUNT: 3.3 10*3/UL (ref 1.5–7.8)
HCT VFR BLD CALC: 42.6 % (ref 40–51)
HDLC SERPL-MCNC: 48 MG/DL (ref 40–180)
HEMOGLOBIN: 14.3 G/DL (ref 13.2–17.1)
IMMATURE GRANULOCYTES: 0 10*3/UL (ref 0–0.1)
IMMATURE GRANULOCYTES: 0.2 % (ref 0–2)
LDL CHOLESTEROL CALCULATED: 98 MG/DL (ref 0–100)
LYMPHOCYTES ABSOLUTE: 1.5 10*3/UL (ref 0.8–3.9)
LYMPHOCYTES RELATIVE PERCENT: 27.8 %
MCH RBC QN AUTO: 32.7 PG (ref 27–33)
MCHC RBC AUTO-ENTMCNC: 33.6 G/DL (ref 30–36)
MCV RBC AUTO: 97.5 FL (ref 80–100)
MONOCYTES ABSOLUTE: 0.4 10*3/UL (ref 0.2–0.9)
MONOCYTES RELATIVE PERCENT: 6.5 %
PDW BLD-RTO: 11.3 % (ref 11–15)
PLATELET # BLD: 222 10*3/UL (ref 140–400)
PMV BLD AUTO: 9.1 FL (ref 9–13)
PROSTATE SPECIFIC ANTIGEN: 2.4 NG/ML (ref 0–4)
RBC # BLD: 4.37 10*6/UL (ref 4.2–5.8)
SEGMENTED NEUTROPHILS RELATIVE PERCENT: 59 %
TRIGL SERPL-MCNC: 115 MG/DL (ref 0–149)
TSH ULTRASENSITIVE: 1.08 UIU/ML (ref 0.35–4.94)
WBC # BLD: 5.5 10*3/UL (ref 3.8–10.8)

## 2023-01-06 RX ORDER — GABAPENTIN 300 MG/1
CAPSULE ORAL
Qty: 90 CAPSULE | Refills: 3 | Status: SHIPPED | OUTPATIENT
Start: 2023-01-06 | End: 2023-02-06

## 2023-01-06 NOTE — TELEPHONE ENCOUNTER
Medication and Quantity requested:    gabapentin (NEURONTIN) 300 MG capsule [[id number]]        Last Visit    12/28/2022    Pharmacy and phone number updated in EPIC:  yes    [facility]

## 2023-01-27 RX ORDER — METOPROLOL SUCCINATE 25 MG/1
TABLET, EXTENDED RELEASE ORAL
Qty: 90 TABLET | Refills: 3 | Status: SHIPPED | OUTPATIENT
Start: 2023-01-27

## 2023-01-27 NOTE — TELEPHONE ENCOUNTER
Lov 12/28/22  Lrf 90 1 7/22/22  Lab Results   Component Value Date     01/13/2021    K 5.3 (H) 01/13/2021     01/13/2021    CO2 27 01/13/2021    BUN 16 01/13/2021    CREATININE 1.0 01/13/2021    GLUCOSE 139 (H) 01/13/2021    CALCIUM 9.9 01/13/2021    PROT 6.9 01/13/2021    LABALBU 4.3 01/13/2021    BILITOT 0.8 01/13/2021    ALKPHOS 166 (H) 01/13/2021    AST 21 01/13/2021    ALT 19 01/13/2021    LABGLOM >60 01/13/2021    GFRAA >60 01/13/2021    AGRATIO 1.7 01/13/2021    GLOB 2.6 01/13/2021

## 2023-02-20 DIAGNOSIS — E11.69 DIABETES MELLITUS TYPE 2 IN OBESE (HCC): ICD-10-CM

## 2023-02-20 DIAGNOSIS — E66.9 DIABETES MELLITUS TYPE 2 IN OBESE (HCC): ICD-10-CM

## 2023-02-21 RX ORDER — BLOOD SUGAR DIAGNOSTIC
STRIP MISCELLANEOUS
Qty: 100 STRIP | Refills: 3 | Status: SHIPPED | OUTPATIENT
Start: 2023-02-21

## 2023-02-21 NOTE — TELEPHONE ENCOUNTER
Medication:   Requested Prescriptions     Pending Prescriptions Disp Refills    ONETOUCH ULTRA strip [Pharmacy Med Name: Sherwin Delgadillo 100'S] 100 strip 3     Sig: USE 1 STRIP  DAILY AS NEEDED       Last Filled:      Patient Phone Number: [phone] (home) [phone] (work)    Last appt: 12/28/2022   Next appt: Visit date not found    Last Labs DM:   Lab Results   Component Value Date/Time    LABA1C 6.5 10/12/2021 06:02 PM

## 2023-03-01 ENCOUNTER — TELEPHONE (OUTPATIENT)
Dept: FAMILY MEDICINE CLINIC | Age: 58
End: 2023-03-01

## 2023-03-01 RX ORDER — GABAPENTIN 300 MG/1
CAPSULE ORAL
Qty: 90 CAPSULE | Refills: 3 | Status: SHIPPED | OUTPATIENT
Start: 2023-03-01 | End: 2023-04-01

## 2023-03-01 NOTE — TELEPHONE ENCOUNTER
Medication and Quantity requested:      gabapentin (NEURONTIN) 300 MG capsule   90 day supply  Last Visit  12.28.22    Pharmacy and phone number updated in EPIC:  yes  [facility]

## 2023-03-07 RX ORDER — GABAPENTIN 300 MG/1
CAPSULE ORAL
Qty: 270 CAPSULE | Refills: 3 | Status: SHIPPED | OUTPATIENT
Start: 2023-03-07 | End: 2023-04-07

## 2023-03-16 ENCOUNTER — TELEPHONE (OUTPATIENT)
Dept: FAMILY MEDICINE CLINIC | Age: 58
End: 2023-03-16

## 2023-03-16 NOTE — TELEPHONE ENCOUNTER
Patient states his handicap placard needs renewal  Patient is asking for at least 5 years, longer if possible  Contact patient

## 2023-03-16 NOTE — LETTER
March 16, 2023       Rojas Pearce YOB: 1965   [address] Date of Visit:  3/16/2023       To Whom It May Concern: It is my medical opinion that Rojas Pearce requires a disability parking placard for the following reasons:  He cannot walk 200 feet without stopping to rest.  Duration of need: 5 years    If you have any questions or concerns, please don't hesitate to call.     Sincerely,        Jorge Luis Crooks MD

## 2023-03-28 RX ORDER — GABAPENTIN 300 MG/1
CAPSULE ORAL
Qty: 270 CAPSULE | Refills: 3 | Status: SHIPPED | OUTPATIENT
Start: 2023-03-28 | End: 2023-04-28

## 2023-03-28 NOTE — TELEPHONE ENCOUNTER
Medication and Quantity requested: gabapentin (NEURONTIN) 300 MG capsule        Last Visit  12.28.22    Pharmacy and phone number updated in EPIC:  yes    [facility]    [address] does not cover 90 day supply for patient per insurance.

## 2023-05-08 ENCOUNTER — TELEPHONE (OUTPATIENT)
Dept: ADMINISTRATIVE | Age: 58
End: 2023-05-08

## 2023-05-08 NOTE — TELEPHONE ENCOUNTER
Submitted PA for GABAPENTIN  [facility] Key: [id number] STATUS: APPROVED THROUGH 05/07/2024. Follow up done daily; if no response in three days we will refax for status check. If another three days goes by with no response we will call the insurance for status. If this requires a response please respond to the pool. [address]). Please advise patient thank you.

## 2023-05-08 NOTE — TELEPHONE ENCOUNTER
I need a diagnosis for Gabapentin. Please advise. I    If this requires a response please respond to the pool. [address]). thank you.

## 2023-06-19 DIAGNOSIS — R39.198 DECREASED URINE STREAM: ICD-10-CM

## 2023-06-19 RX ORDER — TAMSULOSIN HYDROCHLORIDE 0.4 MG/1
CAPSULE ORAL
Qty: 90 CAPSULE | Refills: 3 | Status: SHIPPED | OUTPATIENT
Start: 2023-06-19

## 2023-07-11 ENCOUNTER — OFFICE VISIT (OUTPATIENT)
Dept: FAMILY MEDICINE CLINIC | Age: 58
End: 2023-07-11
Payer: COMMERCIAL

## 2023-07-11 VITALS
OXYGEN SATURATION: 93 % | BODY MASS INDEX: 40.03 KG/M2 | HEART RATE: 84 BPM | SYSTOLIC BLOOD PRESSURE: 120 MMHG | WEIGHT: 279 LBS | DIASTOLIC BLOOD PRESSURE: 80 MMHG

## 2023-07-11 DIAGNOSIS — G47.33 OSA (OBSTRUCTIVE SLEEP APNEA): ICD-10-CM

## 2023-07-11 DIAGNOSIS — E11.69 DIABETES MELLITUS TYPE 2 IN OBESE (HCC): Primary | ICD-10-CM

## 2023-07-11 DIAGNOSIS — I83.019 VENOUS STASIS ULCERS OF BOTH LOWER EXTREMITIES (HCC): ICD-10-CM

## 2023-07-11 DIAGNOSIS — L97.929 VENOUS STASIS ULCERS OF BOTH LOWER EXTREMITIES (HCC): ICD-10-CM

## 2023-07-11 DIAGNOSIS — L97.919 VENOUS STASIS ULCERS OF BOTH LOWER EXTREMITIES (HCC): ICD-10-CM

## 2023-07-11 DIAGNOSIS — F17.209 TOBACCO USE DISORDER, CONTINUOUS: ICD-10-CM

## 2023-07-11 DIAGNOSIS — I10 ESSENTIAL HYPERTENSION: ICD-10-CM

## 2023-07-11 DIAGNOSIS — E66.9 DIABETES MELLITUS TYPE 2 IN OBESE (HCC): Primary | ICD-10-CM

## 2023-07-11 DIAGNOSIS — I83.029 VENOUS STASIS ULCERS OF BOTH LOWER EXTREMITIES (HCC): ICD-10-CM

## 2023-07-11 LAB
CREATININE URINE POCT: 300
HBA1C MFR BLD: 8.3 %
MICROALBUMIN/CREAT 24H UR: 150 MG/G{CREAT}
MICROALBUMIN/CREAT UR-RTO: NORMAL

## 2023-07-11 PROCEDURE — 3074F SYST BP LT 130 MM HG: CPT | Performed by: FAMILY MEDICINE

## 2023-07-11 PROCEDURE — 82044 UR ALBUMIN SEMIQUANTITATIVE: CPT | Performed by: FAMILY MEDICINE

## 2023-07-11 PROCEDURE — 83037 HB GLYCOSYLATED A1C HOME DEV: CPT | Performed by: FAMILY MEDICINE

## 2023-07-11 PROCEDURE — 3052F HG A1C>EQUAL 8.0%<EQUAL 9.0%: CPT | Performed by: FAMILY MEDICINE

## 2023-07-11 PROCEDURE — 99214 OFFICE O/P EST MOD 30 MIN: CPT | Performed by: FAMILY MEDICINE

## 2023-07-11 PROCEDURE — 3079F DIAST BP 80-89 MM HG: CPT | Performed by: FAMILY MEDICINE

## 2023-07-11 RX ORDER — MELOXICAM 15 MG/1
15 TABLET ORAL DAILY
COMMUNITY
Start: 2023-07-07

## 2023-07-11 RX ORDER — SILDENAFIL 50 MG/1
50 TABLET, FILM COATED ORAL PRN
Qty: 10 TABLET | Refills: 3 | Status: SHIPPED | OUTPATIENT
Start: 2023-07-11

## 2023-07-11 ASSESSMENT — PATIENT HEALTH QUESTIONNAIRE - PHQ9
2. FEELING DOWN, DEPRESSED OR HOPELESS: 0
SUM OF ALL RESPONSES TO PHQ QUESTIONS 1-9: 0
SUM OF ALL RESPONSES TO PHQ9 QUESTIONS 1 & 2: 0
SUM OF ALL RESPONSES TO PHQ QUESTIONS 1-9: 0
1. LITTLE INTEREST OR PLEASURE IN DOING THINGS: 0

## 2023-07-11 NOTE — PROGRESS NOTES
Visual inspection:  Deformity/amputation: absent  Skin lesions/pre-ulcerative calluses: absent  Edema: right- trace, left- trace    Sensory exam:  Monofilament sensation: abnormal - has hard time feeling his heels  (minimum of 5 random plantar locations tested, avoiding callused areas - > 1 area with absence of sensation is + for neuropathy)    Pulses: normal,
exercise and come back in 3 months  - POCT glycosylated hemoglobin (Hb A1C)  - POCT microalbumin  - HM DIABETES FOOT EXAM    2. Venous stasis ulcers of both lower extremities (HCC)  resolved since  lost weight  Further weight loss and compression stockings could help prevent future ulcers    3. Tobacco use disorder, continuous  I told pt it is very important to quit smoking! When ready, I would like to schedule an appointment to discuss the various tools we can offer, such as classes, hypnosis, accupuncture, or/and medications. Suggest pt. get to a class, pick a quit date, and RTO to discuss. Multiple approaches toward motivating the patient were explored. 4. HANNAH (obstructive sleep apnea)   Not using CPAP    5. Essential hypertension  Stable, wean off toprol  Continue verapamil    6ed  Viagra trial  The patient desires Viagra to treat his erectile dysfunction. History and physical exam has not disclosed any obvious treatable cause of this complaint. He is informed that Viagra is sometimes not covered by insurance. It is available on a fee-for-service cost basis, and is relatively expensive. He can start with 50 mg dose, and increase to 100 mg if necessary. The method of use 1 hour prior to anticipated intercourse is explained. He should not use any more than one tablet in a 24 hour period. The side effects of possible headache, flushing, dyspepsia and transient changes in vision have been explained. The patient is not taking nitrates, and denies he has access to nitrates in any form at any time. I have counseled him that taking Viagra with nitrates of any form can cause death. Additionally, Viagra serum concentrations can be increased by the following: cimetidine, erythromycin, itraconazole or ketoconazole. This patient does not take these drugs, but I have counseled him to avoid Viagra if he does take any of these.     We have also discussed the fact that there have been some deaths in patients after

## 2023-07-13 ENCOUNTER — TELEPHONE (OUTPATIENT)
Dept: FAMILY MEDICINE CLINIC | Age: 58
End: 2023-07-13

## 2023-07-13 NOTE — TELEPHONE ENCOUNTER
Office has been notified that pt is requiring Prior Authorization for the following medication:  sildenafil (VIAGRA) 50 MG tablet       Please initiate this request through CoverMyMeds, contacting the following Payor/Insurance:  [employer] plan    Please see below, or the documentation attached to this encounter for any additional information that may assist in processing PA:  KEY: [id number]    Form scanned under media in this telephone encounter      Thank you!

## 2023-07-14 NOTE — TELEPHONE ENCOUNTER
Submitted PA for Sildenafil Citrate   [facility]ia [facility] Key: [id number] STATUS: PENDING. Follow up done daily; if no response in three days we will refax for status check. If another three days goes by with no response we will call the insurance for status.

## 2023-07-14 NOTE — TELEPHONE ENCOUNTER
APPROVED THROUGH 07/13/2024. If this requires a response please respond to the pool. [facility]). Please advise patient thank you.

## 2023-09-05 ENCOUNTER — TELEPHONE (OUTPATIENT)
Dept: FAMILY MEDICINE CLINIC | Age: 58
End: 2023-09-05

## 2023-09-05 RX ORDER — TADALAFIL 20 MG/1
20 TABLET ORAL DAILY PRN
Qty: 10 TABLET | Refills: 0 | Status: SHIPPED | OUTPATIENT
Start: 2023-09-05

## 2023-09-05 NOTE — TELEPHONE ENCOUNTER
Patient called in regards to     sildenafil (VIAGRA) 50 MG tablet         He took two of them and it still did not \" do the trick\" as the patient put it      Please advise and see what else we can do for him

## 2023-09-11 ENCOUNTER — TELEPHONE (OUTPATIENT)
Dept: FAMILY MEDICINE CLINIC | Age: 58
End: 2023-09-11

## 2023-09-20 RX ORDER — SILDENAFIL 50 MG/1
50 TABLET, FILM COATED ORAL PRN
Qty: 10 TABLET | Refills: 3 | Status: SHIPPED | OUTPATIENT
Start: 2023-09-20

## 2023-10-12 ENCOUNTER — OFFICE VISIT (OUTPATIENT)
Dept: FAMILY MEDICINE CLINIC | Age: 58
End: 2023-10-12
Payer: COMMERCIAL

## 2023-10-12 VITALS
OXYGEN SATURATION: 98 % | DIASTOLIC BLOOD PRESSURE: 85 MMHG | SYSTOLIC BLOOD PRESSURE: 135 MMHG | BODY MASS INDEX: 40.32 KG/M2 | HEART RATE: 88 BPM | WEIGHT: 281 LBS

## 2023-10-12 DIAGNOSIS — G47.33 OSA (OBSTRUCTIVE SLEEP APNEA): ICD-10-CM

## 2023-10-12 DIAGNOSIS — L97.929 VENOUS STASIS ULCERS OF BOTH LOWER EXTREMITIES (HCC): ICD-10-CM

## 2023-10-12 DIAGNOSIS — F17.209 TOBACCO USE DISORDER, CONTINUOUS: ICD-10-CM

## 2023-10-12 DIAGNOSIS — I83.019 VENOUS STASIS ULCERS OF BOTH LOWER EXTREMITIES (HCC): ICD-10-CM

## 2023-10-12 DIAGNOSIS — I89.0 LYMPHEDEMA: ICD-10-CM

## 2023-10-12 DIAGNOSIS — Z23 FLU VACCINE NEED: ICD-10-CM

## 2023-10-12 DIAGNOSIS — I83.029 VENOUS STASIS ULCERS OF BOTH LOWER EXTREMITIES (HCC): ICD-10-CM

## 2023-10-12 DIAGNOSIS — Z87.891 PERSONAL HISTORY OF TOBACCO USE: ICD-10-CM

## 2023-10-12 DIAGNOSIS — L97.919 VENOUS STASIS ULCERS OF BOTH LOWER EXTREMITIES (HCC): ICD-10-CM

## 2023-10-12 DIAGNOSIS — E66.9 DIABETES MELLITUS TYPE 2 IN OBESE (HCC): Primary | ICD-10-CM

## 2023-10-12 DIAGNOSIS — E11.69 DIABETES MELLITUS TYPE 2 IN OBESE (HCC): Primary | ICD-10-CM

## 2023-10-12 DIAGNOSIS — I10 ESSENTIAL HYPERTENSION: ICD-10-CM

## 2023-10-12 DIAGNOSIS — Z98.84 S/P LAPAROSCOPIC SLEEVE GASTRECTOMY: ICD-10-CM

## 2023-10-12 LAB — HBA1C MFR BLD: 8.5 %

## 2023-10-12 PROCEDURE — 99214 OFFICE O/P EST MOD 30 MIN: CPT | Performed by: FAMILY MEDICINE

## 2023-10-12 PROCEDURE — 90694 VACC AIIV4 NO PRSRV 0.5ML IM: CPT | Performed by: FAMILY MEDICINE

## 2023-10-12 PROCEDURE — 83036 HEMOGLOBIN GLYCOSYLATED A1C: CPT | Performed by: FAMILY MEDICINE

## 2023-10-12 PROCEDURE — 3079F DIAST BP 80-89 MM HG: CPT | Performed by: FAMILY MEDICINE

## 2023-10-12 PROCEDURE — 3052F HG A1C>EQUAL 8.0%<EQUAL 9.0%: CPT | Performed by: FAMILY MEDICINE

## 2023-10-12 PROCEDURE — 3075F SYST BP GE 130 - 139MM HG: CPT | Performed by: FAMILY MEDICINE

## 2023-10-12 PROCEDURE — 90471 IMMUNIZATION ADMIN: CPT | Performed by: FAMILY MEDICINE

## 2023-10-12 PROCEDURE — G0296 VISIT TO DETERM LDCT ELIG: HCPCS | Performed by: FAMILY MEDICINE

## 2023-10-12 RX ORDER — ATORVASTATIN CALCIUM 20 MG/1
20 TABLET, FILM COATED ORAL DAILY
Qty: 90 TABLET | Refills: 3 | Status: SHIPPED | OUTPATIENT
Start: 2023-10-12

## 2023-10-12 RX ORDER — LOSARTAN POTASSIUM 50 MG/1
50 TABLET ORAL DAILY
Qty: 90 TABLET | Refills: 3 | Status: SHIPPED | OUTPATIENT
Start: 2023-10-12

## 2023-10-12 SDOH — ECONOMIC STABILITY: HOUSING INSECURITY
IN THE LAST 12 MONTHS, WAS THERE A TIME WHEN YOU DID NOT HAVE A STEADY PLACE TO SLEEP OR SLEPT IN A SHELTER (INCLUDING NOW)?: NO

## 2023-10-12 SDOH — ECONOMIC STABILITY: INCOME INSECURITY: HOW HARD IS IT FOR YOU TO PAY FOR THE VERY BASICS LIKE FOOD, HOUSING, MEDICAL CARE, AND HEATING?: NOT HARD AT ALL

## 2023-10-12 SDOH — ECONOMIC STABILITY: FOOD INSECURITY: WITHIN THE PAST 12 MONTHS, THE FOOD YOU BOUGHT JUST DIDN'T LAST AND YOU DIDN'T HAVE MONEY TO GET MORE.: NEVER TRUE

## 2023-10-12 SDOH — ECONOMIC STABILITY: FOOD INSECURITY: WITHIN THE PAST 12 MONTHS, YOU WORRIED THAT YOUR FOOD WOULD RUN OUT BEFORE YOU GOT MONEY TO BUY MORE.: NEVER TRUE

## 2023-10-12 NOTE — PROGRESS NOTES
Amrik Walker is a 60 y.o. male. HPI:here for complex medical visit  Still smoking 1/2 ppd  Back working 4 hr /day at the [employer] following knee surgery per ortho, has f/u visit  Has not been using his CPAP  Maximum weight was 380 he got all the way down to 210 now is back up to 280  No open sores on his feet compression stockings  Off all diabetic medicines at this time  Meds, vitamins and allergies reviewed with pt    ROS: No TIA's or unusual headaches, no dysphagia. No prolonged cough. No dyspnea or chest pain on exertion. No abdominal pain, change in bowel habits, black or bloody stools. No urinary tract symptoms. No new or unusual musculoskeletal symptoms. Prior to Visit Medications    Medication Sig Taking?  Authorizing Provider   sildenafil (VIAGRA) 50 MG tablet Take 1 tablet by mouth as needed for Erectile Dysfunction Yes Rusty Kincaid MD   tadalafil (CIALIS) 20 MG tablet Take 1 tablet by mouth daily as needed for Erectile Dysfunction Yes Rusty Kincaid MD   meloxicam (MOBIC) 15 MG tablet Take 1 tablet by mouth daily Yes ProviderMario MD   tamsulosin (FLOMAX) 0.4 MG capsule TAKE 1 CAPSULE DAILY Yes Rusty Kincaid MD   ONETOUCH ULTRA strip USE 1 STRIP  DAILY AS NEEDED Yes Rusty Kincaid MD   verapamil (CALAN SR) 120 MG extended release tablet TAKE 1 TABLET DAILY Yes Rusty Kincaid MD   Multiple Vitamins-Minerals (BARIATRIC FUSION) CHEW Take 3 tablets by mouth daily Yes ProviderMario MD   gabapentin (NEURONTIN) 300 MG capsule TAKE 3 CAPSULES TWICE A DAY  Jerzy Villalobos MD   naproxen (NAPROSYN) 500 MG tablet Take 1 tablet by mouth 2 times daily as needed for Pain  HANY Goldman   ibuprofen (ADVIL;MOTRIN) 800 MG tablet Take 1 tablet by mouth 3 times daily (with meals)  Rusty Kincaid MD       Past Medical History:   Diagnosis Date    Arthritis     Diabetes mellitus type 2 in obese (HCC)     Diabetes mellitus type 2 in obese ([address])

## 2023-11-02 ENCOUNTER — HOSPITAL ENCOUNTER (OUTPATIENT)
Dept: CT IMAGING | Age: 58
Discharge: HOME | End: 2023-11-02
Attending: FAMILY MEDICINE
Payer: COMMERCIAL

## 2023-11-02 DIAGNOSIS — Z87.891 PERSONAL HISTORY OF TOBACCO USE: ICD-10-CM

## 2023-11-02 PROCEDURE — 71271 CT THORAX LUNG CANCER SCR C-: CPT

## 2023-11-21 RX ORDER — GABAPENTIN 300 MG/1
CAPSULE ORAL
Qty: 270 CAPSULE | Refills: 3 | Status: SHIPPED | OUTPATIENT
Start: 2023-11-21 | End: 2024-02-20

## 2023-11-21 NOTE — TELEPHONE ENCOUNTER
Medication:   Requested Prescriptions     Pending Prescriptions Disp Refills    gabapentin (NEURONTIN) 300 MG capsule [Pharmacy Med Name: GABAPENTIN CAPS 300MG] 270 capsule 7     Sig: TAKE 3 CAPSULES TWICE A DAY       Last Filled:      Patient Phone Number: [phone] (home)     Last appt: 10/12/2023   Next appt: 1/15/2024    Lab Results   Component Value Date     01/13/2021    K 5.3 (H) 01/13/2021     01/13/2021    CO2 27 01/13/2021    BUN 16 01/13/2021    CREATININE 1.0 01/13/2021    GLUCOSE 139 (H) 01/13/2021    CALCIUM 9.9 01/13/2021    PROT 6.9 01/13/2021    LABALBU 4.3 01/13/2021    BILITOT 0.8 01/13/2021    ALKPHOS 166 (H) 01/13/2021    AST 21 01/13/2021    ALT 19 01/13/2021    LABGLOM >60 01/13/2021    GFRAA >60 01/13/2021    AGRATIO 1.7 01/13/2021    GLOB 2.6 01/13/2021

## 2024-01-16 ENCOUNTER — OFFICE VISIT (OUTPATIENT)
Dept: FAMILY MEDICINE CLINIC | Age: 59
End: 2024-01-16
Payer: COMMERCIAL

## 2024-01-16 VITALS
DIASTOLIC BLOOD PRESSURE: 84 MMHG | SYSTOLIC BLOOD PRESSURE: 159 MMHG | BODY MASS INDEX: 39.46 KG/M2 | WEIGHT: 275 LBS | OXYGEN SATURATION: 98 % | HEART RATE: 80 BPM

## 2024-01-16 DIAGNOSIS — L97.919 VENOUS STASIS ULCERS OF BOTH LOWER EXTREMITIES (HCC): Primary | ICD-10-CM

## 2024-01-16 DIAGNOSIS — F17.209 TOBACCO USE DISORDER, CONTINUOUS: ICD-10-CM

## 2024-01-16 DIAGNOSIS — I89.0 LYMPHEDEMA: ICD-10-CM

## 2024-01-16 DIAGNOSIS — G47.33 OSA (OBSTRUCTIVE SLEEP APNEA): ICD-10-CM

## 2024-01-16 DIAGNOSIS — L97.929 VENOUS STASIS ULCERS OF BOTH LOWER EXTREMITIES (HCC): Primary | ICD-10-CM

## 2024-01-16 DIAGNOSIS — E11.69 DIABETES MELLITUS TYPE 2 IN OBESE (HCC): ICD-10-CM

## 2024-01-16 DIAGNOSIS — I10 ESSENTIAL HYPERTENSION: ICD-10-CM

## 2024-01-16 DIAGNOSIS — I83.029 VENOUS STASIS ULCERS OF BOTH LOWER EXTREMITIES (HCC): Primary | ICD-10-CM

## 2024-01-16 DIAGNOSIS — I83.019 VENOUS STASIS ULCERS OF BOTH LOWER EXTREMITIES (HCC): Primary | ICD-10-CM

## 2024-01-16 DIAGNOSIS — E66.9 DIABETES MELLITUS TYPE 2 IN OBESE (HCC): ICD-10-CM

## 2024-01-16 DIAGNOSIS — E66.9 OBESITY (BMI 30-39.9): ICD-10-CM

## 2024-01-16 LAB — HBA1C MFR BLD: 8.1 %

## 2024-01-16 PROCEDURE — 99214 OFFICE O/P EST MOD 30 MIN: CPT | Performed by: FAMILY MEDICINE

## 2024-01-16 PROCEDURE — 3077F SYST BP >= 140 MM HG: CPT | Performed by: FAMILY MEDICINE

## 2024-01-16 PROCEDURE — 3052F HG A1C>EQUAL 8.0%<EQUAL 9.0%: CPT | Performed by: FAMILY MEDICINE

## 2024-01-16 PROCEDURE — 83036 HEMOGLOBIN GLYCOSYLATED A1C: CPT | Performed by: FAMILY MEDICINE

## 2024-01-16 PROCEDURE — 3079F DIAST BP 80-89 MM HG: CPT | Performed by: FAMILY MEDICINE

## 2024-01-16 RX ORDER — LOSARTAN POTASSIUM 100 MG/1
100 TABLET ORAL DAILY
Qty: 90 TABLET | Refills: 1 | Status: SHIPPED | OUTPATIENT
Start: 2024-01-16

## 2024-01-16 RX ORDER — METFORMIN HYDROCHLORIDE 500 MG/1
500 TABLET, EXTENDED RELEASE ORAL
Qty: 90 TABLET | Refills: 1 | Status: SHIPPED | OUTPATIENT
Start: 2024-01-16

## 2024-01-16 ASSESSMENT — PATIENT HEALTH QUESTIONNAIRE - PHQ9
SUM OF ALL RESPONSES TO PHQ QUESTIONS 1-9: 0
SUM OF ALL RESPONSES TO PHQ QUESTIONS 1-9: 0
2. FEELING DOWN, DEPRESSED OR HOPELESS: 0
SUM OF ALL RESPONSES TO PHQ QUESTIONS 1-9: 0
SUM OF ALL RESPONSES TO PHQ9 QUESTIONS 1 & 2: 0
1. LITTLE INTEREST OR PLEASURE IN DOING THINGS: 0
SUM OF ALL RESPONSES TO PHQ QUESTIONS 1-9: 0

## 2024-01-16 NOTE — PROGRESS NOTES
Josias Shafer is a 58 y.o. male.    HPI:here for complex medical visit  Right knee troublesome at times even after surgery  Still smoking some  Traveling to [place] in the next 4 to 5 weeks  Has no open sores in his feet and his lymphedema swelling is much improved however he does gets abrasions and scrapes and scabs  Had a sleep study but never got his CPAP machine  Meds, vitamins and allergies reviewed with pt    ROS: No TIA's or unusual headaches, no dysphagia.  No prolonged cough. No dyspnea or chest pain on exertion.  No abdominal pain, change in bowel habits, black or bloody stools.  No urinary tract symptoms.  No new or unusual musculoskeletal symptoms.       Prior to Visit Medications    Medication Sig Taking? Authorizing Provider   gabapentin (NEURONTIN) 300 MG capsule TAKE 3 CAPSULES TWICE A DAY Yes Jerzy Villalobos MD   empagliflozin (JARDIANCE) 25 MG tablet Take 1 tablet by mouth daily Yes Jerzy Villalobos MD   losartan (COZAAR) 50 MG tablet Take 1 tablet by mouth daily Yes Jerzy Villalobos MD   atorvastatin (LIPITOR) 20 MG tablet Take 1 tablet by mouth daily Yes Jerzy Villalobos MD   tadalafil (CIALIS) 20 MG tablet Take 1 tablet by mouth daily as needed for Erectile Dysfunction Yes Jerzy Villalobos MD   tamsulosin (FLOMAX) 0.4 MG capsule TAKE 1 CAPSULE DAILY Yes Jerzy Villalobos MD   ONETOUCH ULTRA strip USE 1 STRIP  DAILY AS NEEDED Yes Jerzy Villalobos MD   Multiple Vitamins-Minerals (BARIATRIC FUSION) CHEW Take 3 tablets by mouth daily Yes ProviderMario MD   ibuprofen (ADVIL;MOTRIN) 800 MG tablet Take 1 tablet by mouth 3 times daily (with meals)  Jerzy Villalobos MD       Past Medical History:   Diagnosis Date    Arthritis     Diabetes mellitus type 2 in obese (HCC)     Diabetes mellitus type 2 in obese (HCC)     Diabetes mellitus type 2 in obese (HCC)     Diabetes mellitus type 2 in obese (HCC)     Diabetes mellitus type 2 in obese (HCC)     Diabetes mellitus

## 2024-02-09 ENCOUNTER — OFFICE VISIT (OUTPATIENT)
Dept: FAMILY MEDICINE CLINIC | Age: 59
End: 2024-02-09
Payer: COMMERCIAL

## 2024-02-09 ENCOUNTER — TELEPHONE (OUTPATIENT)
Dept: FAMILY MEDICINE CLINIC | Age: 59
End: 2024-02-09

## 2024-02-09 VITALS
DIASTOLIC BLOOD PRESSURE: 85 MMHG | HEART RATE: 68 BPM | OXYGEN SATURATION: 98 % | WEIGHT: 276 LBS | SYSTOLIC BLOOD PRESSURE: 136 MMHG | BODY MASS INDEX: 39.6 KG/M2

## 2024-02-09 DIAGNOSIS — M25.511 ACUTE PAIN OF RIGHT SHOULDER: Primary | ICD-10-CM

## 2024-02-09 PROCEDURE — 3075F SYST BP GE 130 - 139MM HG: CPT | Performed by: FAMILY MEDICINE

## 2024-02-09 PROCEDURE — 3079F DIAST BP 80-89 MM HG: CPT | Performed by: FAMILY MEDICINE

## 2024-02-09 PROCEDURE — 99213 OFFICE O/P EST LOW 20 MIN: CPT | Performed by: FAMILY MEDICINE

## 2024-02-09 NOTE — TELEPHONE ENCOUNTER
----- Message from Sulma Adam sent at 2/9/2024  8:21 AM EST -----  Subject: Appointment Request    Reason for Call: Established Patient Appointment needed: Urgent Joint Pain    QUESTIONS    Reason for appointment request? Available appointments did not meet   patient need     Additional Information for Provider? Patient wants a call back if any   appointments open up with Griselda today, already has an appointment but is   insisting he be called if an opening comes up.  ---------------------------------------------------------------------------  --------------  CALL BACK INFO  [phone]; OK to leave message on voicemail  ---------------------------------------------------------------------------  --------------  SCRIPT ANSWERS

## 2024-02-09 NOTE — PROGRESS NOTES
Josias Shafer is a 58 y.o. male.    HPI:  10 days to 2 weeks of right shoulder pain.  No specific injury but he was logging suitcases on traveling several weeks ago.  He is right-handed.  No prior surgery of the right arm.  It is hard for him to lift his arm up over his head.  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  CV:  Denies chest pain or palpitations.  Pulm:  Denies shortness of breath, cough.  Abd:  Denies abdominal pain, nausea and vomiting.  Skin: no rash    Allergies   Allergen Reactions    Lisinopril Hives    Amoxicillin-Pot Clavulanate Diarrhea       Prior to Visit Medications    Medication Sig Taking? Authorizing Provider   losartan (COZAAR) 100 MG tablet Take 1 tablet by mouth daily Yes Jerzy Villalobos MD   metFORMIN (GLUCOPHAGE-XR) 500 MG extended release tablet Take 1 tablet by mouth daily (with breakfast) Yes Jerzy Villalobos MD   gabapentin (NEURONTIN) 300 MG capsule TAKE 3 CAPSULES TWICE A DAY Yes Jerzy Villalobos MD   empagliflozin (JARDIANCE) 25 MG tablet Take 1 tablet by mouth daily Yes Jerzy Villalobos MD   atorvastatin (LIPITOR) 20 MG tablet Take 1 tablet by mouth daily Yes Jerzy Villalobos MD   tadalafil (CIALIS) 20 MG tablet Take 1 tablet by mouth daily as needed for Erectile Dysfunction Yes Jerzy Villalobos MD   tamsulosin (FLOMAX) 0.4 MG capsule TAKE 1 CAPSULE DAILY Yes Jerzy Villalobos MD   ONETOUCH ULTRA strip USE 1 STRIP  DAILY AS NEEDED Yes Jerzy Villalobos MD   Multiple Vitamins-Minerals (BARIATRIC FUSION) CHEW Take 3 tablets by mouth daily Yes Mario Field MD   ibuprofen (ADVIL;MOTRIN) 800 MG tablet Take 1 tablet by mouth 3 times daily (with meals)  Jerzy Villalobos

## 2024-02-14 DIAGNOSIS — E66.9 DIABETES MELLITUS TYPE 2 IN OBESE (HCC): ICD-10-CM

## 2024-02-14 DIAGNOSIS — E11.69 DIABETES MELLITUS TYPE 2 IN OBESE (HCC): ICD-10-CM

## 2024-02-14 RX ORDER — BLOOD SUGAR DIAGNOSTIC
STRIP MISCELLANEOUS
Qty: 100 STRIP | Refills: 3 | Status: SHIPPED | OUTPATIENT
Start: 2024-02-14

## 2024-04-02 NOTE — PROGRESS NOTES
Josias Shafer is a 58 y.o. male.    HPI: Here for complex medical visit  Right groin swelling and scrotal swelling over last week or so,   Uncomfortable, no pain, no dysuria  Also OS excessive tearing without itchiness, no sneezing  Still smoking 1/2ppd  Gave up chips and snacks for Lent and lost 17 pounds  Meds, vitamins and allergies reviewed with pt    ROS: No TIA's or unusual headaches, no dysphagia.  No prolonged cough. No dyspnea or chest pain on exertion.  No abdominal pain, change in bowel habits, black or bloody stools.  No urinary tract symptoms.  No new or unusual musculoskeletal symptoms.       Prior to Visit Medications    Medication Sig Taking? Authorizing Provider   ONETOUCH ULTRA strip USE 1 STRIP  DAILY AS NEEDED Yes Jerzy Villalobos MD   losartan (COZAAR) 100 MG tablet Take 1 tablet by mouth daily Yes Jerzy Villalobos MD   metFORMIN (GLUCOPHAGE-XR) 500 MG extended release tablet Take 1 tablet by mouth daily (with breakfast) Yes Jerzy Villalobos MD   empagliflozin (JARDIANCE) 25 MG tablet Take 1 tablet by mouth daily Yes Jerzy Villalobos MD   atorvastatin (LIPITOR) 20 MG tablet Take 1 tablet by mouth daily Yes Jerzy Villalobos MD   tadalafil (CIALIS) 20 MG tablet Take 1 tablet by mouth daily as needed for Erectile Dysfunction Yes Jerzy Villalobos MD   tamsulosin (FLOMAX) 0.4 MG capsule TAKE 1 CAPSULE DAILY Yes Jerzy Villalobos MD   Multiple Vitamins-Minerals (BARIATRIC FUSION) CHEW Take 3 tablets by mouth daily Yes ProviderMario MD   gabapentin (NEURONTIN) 300 MG capsule TAKE 3 CAPSULES TWICE A DAY  Jerzy Villalobos MD   ibuprofen (ADVIL;MOTRIN) 800 MG tablet Take 1 tablet by mouth 3 times daily (with meals)  Jerzy Villalobos MD       Past Medical History:   Diagnosis Date    Arthritis     Diabetes mellitus type 2 in obese     Diabetes mellitus type 2 in obese     Diabetes mellitus type 2 in obese     Diabetes mellitus type 2 in obese     Diabetes mellitus

## 2024-04-03 ENCOUNTER — OFFICE VISIT (OUTPATIENT)
Dept: FAMILY MEDICINE CLINIC | Age: 59
End: 2024-04-03
Payer: COMMERCIAL

## 2024-04-03 VITALS
HEART RATE: 78 BPM | SYSTOLIC BLOOD PRESSURE: 155 MMHG | OXYGEN SATURATION: 98 % | BODY MASS INDEX: 37.16 KG/M2 | DIASTOLIC BLOOD PRESSURE: 82 MMHG | WEIGHT: 259 LBS

## 2024-04-03 DIAGNOSIS — K40.90 NON-RECURRENT UNILATERAL INGUINAL HERNIA WITHOUT OBSTRUCTION OR GANGRENE: ICD-10-CM

## 2024-04-03 DIAGNOSIS — E11.69 TYPE 2 DIABETES MELLITUS WITH OBESITY (HCC): Primary | ICD-10-CM

## 2024-04-03 DIAGNOSIS — Z12.5 PROSTATE CANCER SCREENING: ICD-10-CM

## 2024-04-03 DIAGNOSIS — I10 ESSENTIAL HYPERTENSION: ICD-10-CM

## 2024-04-03 DIAGNOSIS — E66.9 TYPE 2 DIABETES MELLITUS WITH OBESITY (HCC): ICD-10-CM

## 2024-04-03 DIAGNOSIS — G47.33 OSA (OBSTRUCTIVE SLEEP APNEA): ICD-10-CM

## 2024-04-03 DIAGNOSIS — F17.209 TOBACCO USE DISORDER, CONTINUOUS: ICD-10-CM

## 2024-04-03 DIAGNOSIS — E11.69 TYPE 2 DIABETES MELLITUS WITH OBESITY (HCC): ICD-10-CM

## 2024-04-03 DIAGNOSIS — I89.0 LYMPHEDEMA: ICD-10-CM

## 2024-04-03 DIAGNOSIS — E66.9 TYPE 2 DIABETES MELLITUS WITH OBESITY (HCC): Primary | ICD-10-CM

## 2024-04-03 DIAGNOSIS — Z98.84 S/P LAPAROSCOPIC SLEEVE GASTRECTOMY: ICD-10-CM

## 2024-04-03 DIAGNOSIS — N50.89 SCROTAL EDEMA: ICD-10-CM

## 2024-04-03 LAB
ALBUMIN SERPL-MCNC: 4.1 G/DL (ref 3.4–5)
ALBUMIN/GLOB SERPL: 1.4 {RATIO} (ref 1.1–2.2)
ALP SERPL-CCNC: 165 U/L (ref 40–129)
ALT SERPL-CCNC: 25 U/L (ref 10–40)
ANION GAP SERPL CALCULATED.3IONS-SCNC: 14 MMOL/L (ref 3–16)
AST SERPL-CCNC: 25 U/L (ref 15–37)
BASOPHILS # BLD: 0 K/UL (ref 0–0.2)
BASOPHILS NFR BLD: 0.3 %
BILIRUB SERPL-MCNC: 0.5 MG/DL (ref 0–1)
BUN SERPL-MCNC: 13 MG/DL (ref 7–20)
CALCIUM SERPL-MCNC: 9.3 MG/DL (ref 8.3–10.6)
CHLORIDE SERPL-SCNC: 100 MMOL/L (ref 99–110)
CHOLEST SERPL-MCNC: 121 MG/DL (ref 0–199)
CO2 SERPL-SCNC: 26 MMOL/L (ref 21–32)
CREAT SERPL-MCNC: 0.9 MG/DL (ref 0.9–1.3)
DEPRECATED RDW RBC AUTO: 13.1 % (ref 12.4–15.4)
EOSINOPHIL # BLD: 0.2 K/UL (ref 0–0.6)
EOSINOPHIL NFR BLD: 3.1 %
GFR SERPLBLD CREATININE-BSD FMLA CKD-EPI: >90 ML/MIN/{1.73_M2}
GLUCOSE P FAST SERPL-MCNC: 136 MG/DL (ref 70–99)
HBA1C MFR BLD: 6.7 %
HCT VFR BLD AUTO: 41.8 % (ref 40.5–52.5)
HDLC SERPL-MCNC: 49 MG/DL (ref 40–60)
HGB BLD-MCNC: 13.9 G/DL (ref 13.5–17.5)
LDL CHOLESTEROL CALCULATED: 56 MG/DL
LYMPHOCYTES # BLD: 1.7 K/UL (ref 1–5.1)
LYMPHOCYTES NFR BLD: 26.7 %
MCH RBC QN AUTO: 32.9 PG (ref 26–34)
MCHC RBC AUTO-ENTMCNC: 33.2 G/DL (ref 31–36)
MCV RBC AUTO: 99.1 FL (ref 80–100)
MONOCYTES # BLD: 0.4 K/UL (ref 0–1.3)
MONOCYTES NFR BLD: 6 %
NEUTROPHILS # BLD: 4 K/UL (ref 1.7–7.7)
NEUTROPHILS NFR BLD: 63.9 %
PLATELET # BLD AUTO: 333 K/UL (ref 135–450)
PMV BLD AUTO: 7.1 FL (ref 5–10.5)
POTASSIUM SERPL-SCNC: 4.6 MMOL/L (ref 3.5–5.1)
PROT SERPL-MCNC: 7.1 G/DL (ref 6.4–8.2)
RBC # BLD AUTO: 4.22 M/UL (ref 4.2–5.9)
SODIUM SERPL-SCNC: 140 MMOL/L (ref 136–145)
TRIGL SERPL-MCNC: 78 MG/DL (ref 0–150)
VLDLC SERPL CALC-MCNC: 16 MG/DL
WBC # BLD AUTO: 6.3 K/UL (ref 4–11)

## 2024-04-03 PROCEDURE — 99214 OFFICE O/P EST MOD 30 MIN: CPT | Performed by: FAMILY MEDICINE

## 2024-04-03 PROCEDURE — 83036 HEMOGLOBIN GLYCOSYLATED A1C: CPT | Performed by: FAMILY MEDICINE

## 2024-04-03 PROCEDURE — 3079F DIAST BP 80-89 MM HG: CPT | Performed by: FAMILY MEDICINE

## 2024-04-03 PROCEDURE — 3044F HG A1C LEVEL LT 7.0%: CPT | Performed by: FAMILY MEDICINE

## 2024-04-03 PROCEDURE — 3077F SYST BP >= 140 MM HG: CPT | Performed by: FAMILY MEDICINE

## 2024-04-03 RX ORDER — METOPROLOL SUCCINATE 25 MG/1
25 TABLET, EXTENDED RELEASE ORAL DAILY
Qty: 90 TABLET | Refills: 3 | Status: SHIPPED | OUTPATIENT
Start: 2024-04-03

## 2024-04-04 LAB
PSA SERPL DL<=0.01 NG/ML-MCNC: 2.12 NG/ML (ref 0–4)
TSH SERPL DL<=0.005 MIU/L-ACNC: 0.81 UIU/ML (ref 0.27–4.2)

## 2024-04-08 ENCOUNTER — HOSPITAL ENCOUNTER (OUTPATIENT)
Dept: ULTRASOUND IMAGING | Age: 59
Discharge: HOME | End: 2024-04-08
Payer: COMMERCIAL

## 2024-04-08 DIAGNOSIS — N50.89 OTHER SPECIFIED DISORDERS OF THE MALE GENITAL ORGANS: ICD-10-CM

## 2024-04-08 PROCEDURE — 76870 US EXAM SCROTUM: CPT

## 2024-05-02 ENCOUNTER — OFFICE VISIT (OUTPATIENT)
Dept: FAMILY MEDICINE CLINIC | Age: 59
End: 2024-05-02
Payer: COMMERCIAL

## 2024-05-02 VITALS
DIASTOLIC BLOOD PRESSURE: 69 MMHG | OXYGEN SATURATION: 96 % | WEIGHT: 261 LBS | BODY MASS INDEX: 37.45 KG/M2 | HEART RATE: 68 BPM | SYSTOLIC BLOOD PRESSURE: 121 MMHG

## 2024-05-02 DIAGNOSIS — G47.33 OSA (OBSTRUCTIVE SLEEP APNEA): ICD-10-CM

## 2024-05-02 DIAGNOSIS — F17.209 TOBACCO USE DISORDER, CONTINUOUS: ICD-10-CM

## 2024-05-02 DIAGNOSIS — E66.9 TYPE 2 DIABETES MELLITUS WITH OBESITY (HCC): Primary | ICD-10-CM

## 2024-05-02 DIAGNOSIS — E66.9 OBESITY (BMI 30-39.9): ICD-10-CM

## 2024-05-02 DIAGNOSIS — I89.0 LYMPHEDEMA: ICD-10-CM

## 2024-05-02 DIAGNOSIS — N43.3 HYDROCELE IN ADULT: ICD-10-CM

## 2024-05-02 DIAGNOSIS — E11.69 TYPE 2 DIABETES MELLITUS WITH OBESITY (HCC): Primary | ICD-10-CM

## 2024-05-02 DIAGNOSIS — I10 ESSENTIAL HYPERTENSION: ICD-10-CM

## 2024-05-02 LAB — HBA1C MFR BLD: 6.4 %

## 2024-05-02 PROCEDURE — 3078F DIAST BP <80 MM HG: CPT | Performed by: FAMILY MEDICINE

## 2024-05-02 PROCEDURE — 3074F SYST BP LT 130 MM HG: CPT | Performed by: FAMILY MEDICINE

## 2024-05-02 PROCEDURE — 83036 HEMOGLOBIN GLYCOSYLATED A1C: CPT | Performed by: FAMILY MEDICINE

## 2024-05-02 PROCEDURE — 99214 OFFICE O/P EST MOD 30 MIN: CPT | Performed by: FAMILY MEDICINE

## 2024-05-02 PROCEDURE — 3044F HG A1C LEVEL LT 7.0%: CPT | Performed by: FAMILY MEDICINE

## 2024-05-02 NOTE — PROGRESS NOTES
Josias Shafer is a 58 y.o. male.    HPI: Here for complex medical visit  Been walking more at work for the [place] [employer], back on a partial walking route  Still has some right knee swelling, pain is gone since surgery  1/2 ppd tob  Ultrasound done for scrotal edema back on April 8 showed bilateral hydrocele and varicocele no testicular cancer or other worrisome findings, he says the swelling is down  Meds, vitamins and allergies reviewed with pt    ROS: No TIA's or unusual headaches, no dysphagia.  No prolonged cough. No dyspnea or chest pain on exertion.  No abdominal pain, change in bowel habits, black or bloody stools.  No urinary tract symptoms.  No new or unusual musculoskeletal symptoms.       Prior to Visit Medications    Medication Sig Taking? Authorizing Provider   metoprolol succinate (TOPROL XL) 25 MG extended release tablet Take 1 tablet by mouth daily Yes Jerzy Villalobos MD   ONETOUCH ULTRA strip USE 1 STRIP  DAILY AS NEEDED Yes Jerzy Villalobos MD   losartan (COZAAR) 100 MG tablet Take 1 tablet by mouth daily Yes Jerzy Villalobos MD   metFORMIN (GLUCOPHAGE-XR) 500 MG extended release tablet Take 1 tablet by mouth daily (with breakfast) Yes Jerzy Villalobos MD   empagliflozin (JARDIANCE) 25 MG tablet Take 1 tablet by mouth daily Yes Jerzy Villalobos MD   atorvastatin (LIPITOR) 20 MG tablet Take 1 tablet by mouth daily Yes Jerzy Villalobos MD   tadalafil (CIALIS) 20 MG tablet Take 1 tablet by mouth daily as needed for Erectile Dysfunction Yes Jerzy Villalobos MD   tamsulosin (FLOMAX) 0.4 MG capsule TAKE 1 CAPSULE DAILY Yes Jerzy Villalobos MD   Multiple Vitamins-Minerals (BARIATRIC FUSION) CHEW Take 3 tablets by mouth daily Yes ProviderMario MD   gabapentin (NEURONTIN) 300 MG capsule TAKE 3 CAPSULES TWICE A DAY  Jerzy Villalobos MD   ibuprofen (ADVIL;MOTRIN) 800 MG tablet Take 1 tablet by mouth 3 times daily (with meals)  Jerzy Villalobos MD       Past

## 2024-05-22 LAB — DIABETIC RETINOPATHY: NEGATIVE

## 2024-05-24 ENCOUNTER — OFFICE VISIT (OUTPATIENT)
Dept: FAMILY MEDICINE CLINIC | Age: 59
End: 2024-05-24
Payer: COMMERCIAL

## 2024-05-24 VITALS
WEIGHT: 266.4 LBS | BODY MASS INDEX: 38.14 KG/M2 | SYSTOLIC BLOOD PRESSURE: 128 MMHG | HEART RATE: 60 BPM | OXYGEN SATURATION: 98 % | DIASTOLIC BLOOD PRESSURE: 84 MMHG | HEIGHT: 70 IN

## 2024-05-24 DIAGNOSIS — I10 ESSENTIAL HYPERTENSION: ICD-10-CM

## 2024-05-24 DIAGNOSIS — Z01.818 PRE-OP EXAM: ICD-10-CM

## 2024-05-24 DIAGNOSIS — E11.69 TYPE 2 DIABETES MELLITUS WITH OBESITY (HCC): ICD-10-CM

## 2024-05-24 DIAGNOSIS — E66.9 TYPE 2 DIABETES MELLITUS WITH OBESITY (HCC): ICD-10-CM

## 2024-05-24 DIAGNOSIS — Z01.818 PRE-OP EXAM: Primary | ICD-10-CM

## 2024-05-24 LAB
ANION GAP SERPL CALCULATED.3IONS-SCNC: 10 MMOL/L (ref 3–16)
BASOPHILS # BLD: 0.1 K/UL (ref 0–0.2)
BASOPHILS NFR BLD: 0.8 %
BUN SERPL-MCNC: 11 MG/DL (ref 7–20)
CALCIUM SERPL-MCNC: 9.3 MG/DL (ref 8.3–10.6)
CHLORIDE SERPL-SCNC: 104 MMOL/L (ref 99–110)
CO2 SERPL-SCNC: 28 MMOL/L (ref 21–32)
CREAT SERPL-MCNC: 0.8 MG/DL (ref 0.9–1.3)
DEPRECATED RDW RBC AUTO: 13.1 % (ref 12.4–15.4)
EOSINOPHIL # BLD: 0.2 K/UL (ref 0–0.6)
EOSINOPHIL NFR BLD: 2.9 %
GFR SERPLBLD CREATININE-BSD FMLA CKD-EPI: >90 ML/MIN/{1.73_M2}
GLUCOSE SERPL-MCNC: 146 MG/DL (ref 70–99)
HCT VFR BLD AUTO: 43.3 % (ref 40.5–52.5)
HGB BLD-MCNC: 14.7 G/DL (ref 13.5–17.5)
LYMPHOCYTES # BLD: 1.6 K/UL (ref 1–5.1)
LYMPHOCYTES NFR BLD: 26.2 %
MCH RBC QN AUTO: 33.7 PG (ref 26–34)
MCHC RBC AUTO-ENTMCNC: 33.9 G/DL (ref 31–36)
MCV RBC AUTO: 99.3 FL (ref 80–100)
MONOCYTES # BLD: 0.5 K/UL (ref 0–1.3)
MONOCYTES NFR BLD: 8.4 %
NEUTROPHILS # BLD: 3.8 K/UL (ref 1.7–7.7)
NEUTROPHILS NFR BLD: 61.7 %
PLATELET # BLD AUTO: 224 K/UL (ref 135–450)
PMV BLD AUTO: 7.5 FL (ref 5–10.5)
POTASSIUM SERPL-SCNC: 4.5 MMOL/L (ref 3.5–5.1)
RBC # BLD AUTO: 4.36 M/UL (ref 4.2–5.9)
SODIUM SERPL-SCNC: 142 MMOL/L (ref 136–145)
WBC # BLD AUTO: 6.2 K/UL (ref 4–11)

## 2024-05-24 PROCEDURE — 3074F SYST BP LT 130 MM HG: CPT | Performed by: STUDENT IN AN ORGANIZED HEALTH CARE EDUCATION/TRAINING PROGRAM

## 2024-05-24 PROCEDURE — 99214 OFFICE O/P EST MOD 30 MIN: CPT | Performed by: STUDENT IN AN ORGANIZED HEALTH CARE EDUCATION/TRAINING PROGRAM

## 2024-05-24 PROCEDURE — 3044F HG A1C LEVEL LT 7.0%: CPT | Performed by: STUDENT IN AN ORGANIZED HEALTH CARE EDUCATION/TRAINING PROGRAM

## 2024-05-24 PROCEDURE — 3079F DIAST BP 80-89 MM HG: CPT | Performed by: STUDENT IN AN ORGANIZED HEALTH CARE EDUCATION/TRAINING PROGRAM

## 2024-05-24 PROCEDURE — 93000 ELECTROCARDIOGRAM COMPLETE: CPT | Performed by: STUDENT IN AN ORGANIZED HEALTH CARE EDUCATION/TRAINING PROGRAM

## 2024-05-24 ASSESSMENT — ENCOUNTER SYMPTOMS
COUGH: 0
DIARRHEA: 0
CONSTIPATION: 0
SHORTNESS OF BREATH: 0

## 2024-05-24 NOTE — PROGRESS NOTES
[facility] Medicine  Preoperative visit   5/24/2024    Patient is here for preop evaluation at the request of Dr. Davidson for repair of peroneus brevis tendon . Is scheduled for surgery on 05/29/2024    Functional Assessment:  Exercise tolerance: >4 METS using the DASI calculator   Denies any current chest pain or discomfort, sob or BARAHONA, orthopnea, PND or leg swelling.     Medications: reviewed, Over the counter (NSAIDs) use: NA    Cardiac Risk:  High-risk Surgery: No / Hx of ischemic heart disease: No / Hx of CHF: No / Hx of CVA: No / Pre-op insulin: No / Pre-op creatinine >2.0: No (last cr 0.9 )    HANNAH Screen:  Snore loudly? No / Feel tired/fatigued during the day? No / Stop breathing while sleeping? No / High blood pressure? No / Morning HA? No     History of surgery/ anesthesia:  - No hx of complications, anesthesia reaction, bleeding, bruising, clots  - No family Hx of reactions to anesthesia/ bleeding/clots  - Support systems after surgery: Yes  - Smoking/ alcohol/drugs:   - Complicating medical diseases are currently well controlled.     Problem List  Patient Active Problem List   Diagnosis    Type 2 diabetes mellitus with obesity (HCC)    Tobacco use disorder, continuous    Essential hypertension    Stasis dermatitis    Lymphedema    Vitamin D deficiency    HANNAH (obstructive sleep apnea)    Venous stasis ulcers of both lower extremities (HCC)    Ventricular tachycardia (HCC)    S/P laparoscopic sleeve gastrectomy    Obesity (BMI 30-39.9)    Elevated bilirubin    Neuropathy    Hydrocele in adult       Medical and Surgical History  Past Medical History:   Diagnosis Date    Arthritis     Diabetes mellitus type 2 in obese     Diabetes mellitus type 2 in obese     Diabetes mellitus type 2 in obese     Diabetes mellitus type 2 in obese     Diabetes mellitus type 2 in obese     Diabetes mellitus type 2 in obese     DM type 2 (diabetes mellitus, type 2) (HCC)     HTN     HTN (hypertension)      replace

## 2024-05-29 ENCOUNTER — OFFICE VISIT (OUTPATIENT)
Dept: FAMILY MEDICINE CLINIC | Age: 59
End: 2024-05-29
Payer: COMMERCIAL

## 2024-05-29 ENCOUNTER — TELEPHONE (OUTPATIENT)
Dept: FAMILY MEDICINE CLINIC | Age: 59
End: 2024-05-29

## 2024-05-29 VITALS
OXYGEN SATURATION: 98 % | HEART RATE: 75 BPM | SYSTOLIC BLOOD PRESSURE: 130 MMHG | TEMPERATURE: 98.1 F | BODY MASS INDEX: 36.88 KG/M2 | WEIGHT: 257 LBS | DIASTOLIC BLOOD PRESSURE: 88 MMHG

## 2024-05-29 DIAGNOSIS — L03.115 CELLULITIS OF RIGHT LOWER EXTREMITY: Primary | ICD-10-CM

## 2024-05-29 PROCEDURE — 99213 OFFICE O/P EST LOW 20 MIN: CPT | Performed by: FAMILY MEDICINE

## 2024-05-29 PROCEDURE — 3079F DIAST BP 80-89 MM HG: CPT | Performed by: FAMILY MEDICINE

## 2024-05-29 PROCEDURE — 3075F SYST BP GE 130 - 139MM HG: CPT | Performed by: FAMILY MEDICINE

## 2024-05-29 RX ORDER — CLINDAMYCIN HYDROCHLORIDE 300 MG/1
300 CAPSULE ORAL 4 TIMES DAILY
Qty: 40 CAPSULE | Refills: 0 | Status: SHIPPED | OUTPATIENT
Start: 2024-05-29 | End: 2024-06-08

## 2024-05-29 NOTE — PROGRESS NOTES
treat with clindamycin.  Patient advised to follow-up if symptoms do not improve significantly within the next 2 days, or if they worsen.    Discussed with patient medication/s dosage, instructions, potential S/E, A/R and Drug Interaction  Tylenol or Motrin as needed for pain or fever  Advise to return here if worse or go to nearest ER  Encourage fluids  Pt discharged in stable condition at 12:02      1. Cellulitis of right lower extremity  -     clindamycin (CLEOCIN) 300 MG capsule; Take 1 capsule by mouth 4 times daily for 10 days, Disp-40 capsule, R-0Normal       No orders of the defined types were placed in this encounter.      No follow-ups on file.    ANDRES CHILDRESS MD, MD    5/29/2024  11:59 AM

## 2024-05-29 NOTE — TELEPHONE ENCOUNTER
Patient called and is having pain in his right leg,warm,tender to touch,and swelling. Patient was informed you have nothing until June. He would like to be seen sooner. Please advise

## 2024-06-24 NOTE — PROGRESS NOTES
6/24/24 @ 4237 Left VM with instructions to please return call to PAT/Phone# given with when/where pt will be getting Preoperative H&P needed 30 Days or Less prior to DOS and to review meds/health hx. MD    6/24/24 @ 2952 Left VM for Tabitha @ Dr. Davidson's office to clarify antibiotic Ancef ordered due to cross allergy noted with pt allergy to Augmentin. MD    6/25/24 @ 6116 Revised order sheet received from Dr. Davidson's office with Vancomycin antibiotic ordered. Pt confirms will be seeing Dr. Choudhury [phone] later today 6/25 for PreOp H&P/Testing. MD    6/25/24 @ 0979 Notified Anesthesia Dr. Gordon of pt last dose of Jardiance 6/24/24 with no new orders noted. MD

## 2024-06-25 ENCOUNTER — OFFICE VISIT (OUTPATIENT)
Dept: FAMILY MEDICINE CLINIC | Age: 59
End: 2024-06-25

## 2024-06-25 VITALS
RESPIRATION RATE: 16 BRPM | SYSTOLIC BLOOD PRESSURE: 124 MMHG | BODY MASS INDEX: 38.65 KG/M2 | TEMPERATURE: 98.7 F | OXYGEN SATURATION: 97 % | WEIGHT: 270 LBS | HEIGHT: 70 IN | HEART RATE: 85 BPM | DIASTOLIC BLOOD PRESSURE: 68 MMHG

## 2024-06-25 DIAGNOSIS — G62.9 NEUROPATHY: ICD-10-CM

## 2024-06-25 DIAGNOSIS — Z01.818 PRE-OP EXAM: Primary | ICD-10-CM

## 2024-06-25 DIAGNOSIS — Z01.818 PRE-OP EXAM: ICD-10-CM

## 2024-06-25 LAB
APTT BLD: 31.5 SEC (ref 22.1–36.4)
BASOPHILS # BLD: 0 K/UL (ref 0–0.2)
BASOPHILS NFR BLD: 0.7 %
DEPRECATED RDW RBC AUTO: 13.4 % (ref 12.4–15.4)
EOSINOPHIL # BLD: 0.2 K/UL (ref 0–0.6)
EOSINOPHIL NFR BLD: 3.6 %
HCT VFR BLD AUTO: 42.2 % (ref 40.5–52.5)
HGB BLD-MCNC: 13.9 G/DL (ref 13.5–17.5)
INR PPP: 1.03 (ref 0.85–1.15)
LYMPHOCYTES # BLD: 1.9 K/UL (ref 1–5.1)
LYMPHOCYTES NFR BLD: 27.7 %
MCH RBC QN AUTO: 32.7 PG (ref 26–34)
MCHC RBC AUTO-ENTMCNC: 32.9 G/DL (ref 31–36)
MCV RBC AUTO: 99.4 FL (ref 80–100)
MONOCYTES # BLD: 0.6 K/UL (ref 0–1.3)
MONOCYTES NFR BLD: 8.7 %
NEUTROPHILS # BLD: 4.1 K/UL (ref 1.7–7.7)
NEUTROPHILS NFR BLD: 59.3 %
PLATELET # BLD AUTO: 255 K/UL (ref 135–450)
PMV BLD AUTO: 7.5 FL (ref 5–10.5)
PROTHROMBIN TIME: 13.7 SEC (ref 11.9–14.9)
RBC # BLD AUTO: 4.24 M/UL (ref 4.2–5.9)
WBC # BLD AUTO: 6.9 K/UL (ref 4–11)

## 2024-06-25 RX ORDER — HYDROCODONE BITARTRATE AND ACETAMINOPHEN 5; 325 MG/1; MG/1
1-2 TABLET ORAL
COMMUNITY
Start: 2024-06-18

## 2024-06-25 RX ORDER — GABAPENTIN 300 MG/1
CAPSULE ORAL
Qty: 270 CAPSULE | Refills: 3 | Status: SHIPPED | OUTPATIENT
Start: 2024-06-25 | End: 2025-01-28

## 2024-06-25 RX ORDER — RIVAROXABAN 10 MG/1
10 TABLET, FILM COATED ORAL DAILY
COMMUNITY
Start: 2024-05-22

## 2024-06-25 ASSESSMENT — PATIENT HEALTH QUESTIONNAIRE - PHQ9
1. LITTLE INTEREST OR PLEASURE IN DOING THINGS: NOT AT ALL
SUM OF ALL RESPONSES TO PHQ9 QUESTIONS 1 & 2: 1
SUM OF ALL RESPONSES TO PHQ QUESTIONS 1-9: 1
2. FEELING DOWN, DEPRESSED OR HOPELESS: SEVERAL DAYS
SUM OF ALL RESPONSES TO PHQ QUESTIONS 1-9: 1

## 2024-06-25 NOTE — PROGRESS NOTES
[facility] Medicine  Clinic Note    Date: 6/25/2024                                               /Objective:     Chief Complaint   Patient presents with    Pre-op Exam     6/24/2024 left foot surgery, Dr. Davidson, Damian Lester MD @ [facility]         HPI  Patient is here for preop exam.  Is having left foot surgery on with Dr. Davidson on 6/27/24. This is a revision of a surgery that he had done on 6/19/24. Pt was taking Xarelto post-op to prevent blood clots. Saw Dr. Davidson for post-op yesterday, was told he needs another surgery and that he should stop Xarelto immediately for surgery on 6/27/24.    Patient can walk a good distance without chest pain or shortness of breath, using a scooter.  Is limited only by his foot pain. Has no hx of CAD or CHF.    Denies any personal or family history of blood clots or reactions to anesthesia.           Patient Active Problem List    Diagnosis Date Noted    Hydrocele in adult 05/02/2024    Neuropathy     Elevated bilirubin 06/29/2017    Obesity (BMI 30-39.9) 06/14/2017    S/P laparoscopic sleeve gastrectomy 12/28/2016    Venous stasis ulcers of both lower extremities (HCC)     HANNAH (obstructive sleep apnea) 04/19/2016    Vitamin D deficiency 12/23/2015    Lymphedema     Stasis dermatitis 12/05/2013    Essential hypertension 07/08/2013    Tobacco use disorder, continuous     Type 2 diabetes mellitus with obesity (HCC)        Past Medical History:   Diagnosis Date    Arthritis     Diabetes mellitus type 2 in obese     Diabetes mellitus type 2 in obese     Diabetes mellitus type 2 in obese     Diabetes mellitus type 2 in obese     Diabetes mellitus type 2 in obese     Diabetes mellitus type 2 in obese     DM type 2 (diabetes mellitus, type 2) (HCC)     HTN     HTN (hypertension)      replace inactive diagnosis    Lymphedema     Morbid obesity with BMI of 50.0-59.9, adult (HCC)     Neuropathy     feet    Neuropathy     feet    Neuropathy     feet    Obesity

## 2024-06-25 NOTE — PROGRESS NOTES
[facility] PRE-SURGICAL TESTING INSTRUCTIONS                      PRIOR TO PROCEDURE DATE:    1. PLEASE FOLLOW ANY INSTRUCTIONS GIVEN TO YOU PER YOUR SURGEON.      2. Arrange for someone to drive you home and be with you for the first 24 hours after discharge for your safety after your procedure for which you received sedation. Ensure it is someone we can share information with regarding your discharge.     NOTE: At this time ONLY 2 ADULTS may accompany you   One person ENCOURAGED to stay at hospital entire time if outpatient surgery      3. You must contact your surgeon for instructions IF:  You are taking any blood thinners, aspirin, anti-inflammatory or vitamins.  There is a change in your physical condition such as a cold, fever, rash, cuts, sores, or any other infection, especially near your surgical site.    4. Do not drink alcohol the day before or day of your procedure.  Do not use any recreational marijuana at least 24 hours or street drugs (heroin, cocaine) at minimum 5 days prior to your procedure.     5. A Pre-Surgical History and Physical MUST be completed WITHIN 30 DAYS OR LESS prior to your procedure.by your Physician or an Urgent Care        THE DAY OF YOUR PROCEDURE:  1.  Follow instructions for ARRIVAL TIME as DIRECTED BY YOUR SURGEON.     2. Enter the MAIN entrance from [facility] and follow the signs to the free Parking Garage or  Parking (offered free of charge 7 am-5pm).      3. Enter the Main Entrance of the hospital (do not enter from the lower level of the parking garage). Upon entrance, check in with the  at the surgical information desk on your LEFT.   Bring your insurance card and photo ID to register      4. DO NOT EAT ANYTHING 8 hours prior to arrival for surgery.  You may have up to 8 ounces of water 4 hours prior to your arrival for surgery.   NOTE: ALL Gastric, Bariatric & Bowel surgery patients - you MUST follow your surgeon's instructions regarding  as extreme drowsiness, changes in your vital signs or breathing patterns. Nausea, headache, muscle aches, or sore throat may also occur after anesthesia.  Your nurse will help you manage these potential side effects.    2. For comfort and safety, arrange to have someone at home with you for the first 24 hours after discharge.    3. You and your family will be given written instructions about your diet, activity, dressing care, medications, and return visits.     4. Once at home, should issues with nausea, pain, or bleeding occur, or should you notice any signs of infection, you should call your surgeon.    5. Always clean your hands before and after caring for your wound. Do not let your family touch your surgery site without cleaning their hands.     6. Narcotic pain medications can cause significant constipation.  You may want to add a stool softener to your postoperative medication schedule or speak to your surgeon on how best to manage this SIDE EFFECT.    SPECIAL INSTRUCTIONS     Thank you for allowing us to care for you.  We strive to exceed your expectations in the delivery of care and service provided to you and your family.     If you need to contact the Pre-Admission Testing staff for any reason, please call us at [phone]    Instructions reviewed with patient during preadmission testing phone interview.  Nilsa Borrero RN.6/25/2024 .9:49 AM      ADDITIONAL EDUCATIONAL INFORMATION REVIEWED PER PHONE WITH YOU AND/OR YOUR FAMILY:  No Hibiclens® Bathing Instructions   Yes Antibacterial Soap

## 2024-06-26 LAB
ANION GAP SERPL CALCULATED.3IONS-SCNC: 12 MMOL/L (ref 3–16)
BUN SERPL-MCNC: 14 MG/DL (ref 7–20)
CALCIUM SERPL-MCNC: 9.3 MG/DL (ref 8.3–10.6)
CHLORIDE SERPL-SCNC: 102 MMOL/L (ref 99–110)
CO2 SERPL-SCNC: 27 MMOL/L (ref 21–32)
CREAT SERPL-MCNC: 0.9 MG/DL (ref 0.9–1.3)
GFR SERPLBLD CREATININE-BSD FMLA CKD-EPI: >90 ML/MIN/{1.73_M2}
GLUCOSE SERPL-MCNC: 132 MG/DL (ref 70–99)
POTASSIUM SERPL-SCNC: 4.5 MMOL/L (ref 3.5–5.1)
SODIUM SERPL-SCNC: 141 MMOL/L (ref 136–145)

## 2024-06-27 ENCOUNTER — APPOINTMENT (OUTPATIENT)
Dept: GENERAL RADIOLOGY | Age: 59
DRG: 502 | End: 2024-06-27
Attending: ORTHOPAEDIC SURGERY
Payer: COMMERCIAL

## 2024-06-27 ENCOUNTER — HOSPITAL ENCOUNTER (INPATIENT)
Age: 59
LOS: 1 days | Discharge: HOME | DRG: 502 | End: 2024-06-28
Attending: ORTHOPAEDIC SURGERY | Admitting: ORTHOPAEDIC SURGERY
Payer: COMMERCIAL

## 2024-06-27 ENCOUNTER — ANESTHESIA EVENT (OUTPATIENT)
Dept: OPERATING ROOM | Age: 59
End: 2024-06-27
Payer: COMMERCIAL

## 2024-06-27 ENCOUNTER — ANESTHESIA (OUTPATIENT)
Dept: OPERATING ROOM | Age: 59
End: 2024-06-27
Payer: COMMERCIAL

## 2024-06-27 DIAGNOSIS — S86.312A STRAIN OF PERONEAL TENDON OF LEFT FOOT, INITIAL ENCOUNTER: ICD-10-CM

## 2024-06-27 LAB
APTT BLD: 26.6 SEC (ref 22.1–36.4)
GLUCOSE BLD-MCNC: 148 MG/DL (ref 70–99)
GLUCOSE BLD-MCNC: 157 MG/DL (ref 70–99)
GLUCOSE BLD-MCNC: 208 MG/DL (ref 70–99)
GLUCOSE BLD-MCNC: 228 MG/DL (ref 70–99)
INR PPP: 0.99 (ref 0.85–1.15)
MRSA DNA SPEC QL NAA+PROBE: NORMAL
PERFORMED ON: ABNORMAL
PROTHROMBIN TIME: 13.3 SEC (ref 11.9–14.9)

## 2024-06-27 PROCEDURE — 2709999900 HC NON-CHARGEABLE SUPPLY: Performed by: ORTHOPAEDIC SURGERY

## 2024-06-27 PROCEDURE — 3700000001 HC ADD 15 MINUTES (ANESTHESIA): Performed by: ORTHOPAEDIC SURGERY

## 2024-06-27 PROCEDURE — 6360000002 HC RX W HCPCS: Performed by: ANESTHESIOLOGY

## 2024-06-27 PROCEDURE — 6360000002 HC RX W HCPCS: Performed by: ORTHOPAEDIC SURGERY

## 2024-06-27 PROCEDURE — 73620 X-RAY EXAM OF FOOT: CPT

## 2024-06-27 PROCEDURE — 6370000000 HC RX 637 (ALT 250 FOR IP): Performed by: ORTHOPAEDIC SURGERY

## 2024-06-27 PROCEDURE — 3700000000 HC ANESTHESIA ATTENDED CARE: Performed by: ORTHOPAEDIC SURGERY

## 2024-06-27 PROCEDURE — 2500000003 HC RX 250 WO HCPCS: Performed by: NURSE ANESTHETIST, CERTIFIED REGISTERED

## 2024-06-27 PROCEDURE — G0378 HOSPITAL OBSERVATION PER HR: HCPCS

## 2024-06-27 PROCEDURE — 6360000002 HC RX W HCPCS: Performed by: NURSE ANESTHETIST, CERTIFIED REGISTERED

## 2024-06-27 PROCEDURE — 6370000000 HC RX 637 (ALT 250 FOR IP): Performed by: INTERNAL MEDICINE

## 2024-06-27 PROCEDURE — 85610 PROTHROMBIN TIME: CPT

## 2024-06-27 PROCEDURE — 2580000003 HC RX 258: Performed by: ORTHOPAEDIC SURGERY

## 2024-06-27 PROCEDURE — 7100000001 HC PACU RECOVERY - ADDTL 15 MIN: Performed by: ORTHOPAEDIC SURGERY

## 2024-06-27 PROCEDURE — 64445 NJX AA&/STRD SCIATIC NRV IMG: CPT | Performed by: ANESTHESIOLOGY

## 2024-06-27 PROCEDURE — 87641 MR-STAPH DNA AMP PROBE: CPT

## 2024-06-27 PROCEDURE — 2580000003 HC RX 258: Performed by: ANESTHESIOLOGY

## 2024-06-27 PROCEDURE — 2580000003 HC RX 258: Performed by: NURSE ANESTHETIST, CERTIFIED REGISTERED

## 2024-06-27 PROCEDURE — 3600000004 HC SURGERY LEVEL 4 BASE: Performed by: ORTHOPAEDIC SURGERY

## 2024-06-27 PROCEDURE — 0LXW0ZZ TRANSFER LEFT FOOT TENDON, OPEN APPROACH: ICD-10-PCS | Performed by: ORTHOPAEDIC SURGERY

## 2024-06-27 PROCEDURE — 88304 TISSUE EXAM BY PATHOLOGIST: CPT

## 2024-06-27 PROCEDURE — 1200000000 HC SEMI PRIVATE

## 2024-06-27 PROCEDURE — C1713 ANCHOR/SCREW BN/BN,TIS/BN: HCPCS | Performed by: ORTHOPAEDIC SURGERY

## 2024-06-27 PROCEDURE — 85730 THROMBOPLASTIN TIME PARTIAL: CPT

## 2024-06-27 PROCEDURE — A4217 STERILE WATER/SALINE, 500 ML: HCPCS | Performed by: ORTHOPAEDIC SURGERY

## 2024-06-27 PROCEDURE — 7100000000 HC PACU RECOVERY - FIRST 15 MIN: Performed by: ORTHOPAEDIC SURGERY

## 2024-06-27 PROCEDURE — 3600000014 HC SURGERY LEVEL 4 ADDTL 15MIN: Performed by: ORTHOPAEDIC SURGERY

## 2024-06-27 DEVICE — ANCHOR SUT L15.8MM DIA3.5MM BIOCOMPOSITE SCR IN KNOTLESS: Type: IMPLANTABLE DEVICE | Status: FUNCTIONAL

## 2024-06-27 RX ORDER — GABAPENTIN 300 MG/1
900 CAPSULE ORAL 2 TIMES DAILY
Status: DISCONTINUED | OUTPATIENT
Start: 2024-06-28 | End: 2024-06-28 | Stop reason: HOSPADM

## 2024-06-27 RX ORDER — DEXAMETHASONE SODIUM PHOSPHATE 4 MG/ML
INJECTION, SOLUTION INTRA-ARTICULAR; INTRALESIONAL; INTRAMUSCULAR; INTRAVENOUS; SOFT TISSUE PRN
Status: DISCONTINUED | OUTPATIENT
Start: 2024-06-27 | End: 2024-06-27 | Stop reason: SDUPTHER

## 2024-06-27 RX ORDER — ACETAMINOPHEN 325 MG/1
650 TABLET ORAL EVERY 4 HOURS PRN
Status: DISCONTINUED | OUTPATIENT
Start: 2024-06-27 | End: 2024-06-28 | Stop reason: HOSPADM

## 2024-06-27 RX ORDER — SODIUM CHLORIDE 0.9 % (FLUSH) 0.9 %
5-40 SYRINGE (ML) INJECTION PRN
Status: DISCONTINUED | OUTPATIENT
Start: 2024-06-27 | End: 2024-06-28 | Stop reason: HOSPADM

## 2024-06-27 RX ORDER — SODIUM CHLORIDE 9 MG/ML
INJECTION, SOLUTION INTRAVENOUS PRN
Status: DISCONTINUED | OUTPATIENT
Start: 2024-06-27 | End: 2024-06-28 | Stop reason: HOSPADM

## 2024-06-27 RX ORDER — KETAMINE HCL IN NACL, ISO-OSM 20 MG/2 ML
SYRINGE (ML) INJECTION PRN
Status: DISCONTINUED | OUTPATIENT
Start: 2024-06-27 | End: 2024-06-27 | Stop reason: SDUPTHER

## 2024-06-27 RX ORDER — SODIUM CHLORIDE 9 MG/ML
INJECTION, SOLUTION INTRAVENOUS PRN
Status: DISCONTINUED | OUTPATIENT
Start: 2024-06-27 | End: 2024-06-27 | Stop reason: HOSPADM

## 2024-06-27 RX ORDER — CLINDAMYCIN PHOSPHATE 600 MG/50ML
600 INJECTION, SOLUTION INTRAVENOUS EVERY 8 HOURS
Status: COMPLETED | OUTPATIENT
Start: 2024-06-27 | End: 2024-06-28

## 2024-06-27 RX ORDER — FENTANYL CITRATE 50 UG/ML
25 INJECTION, SOLUTION INTRAMUSCULAR; INTRAVENOUS EVERY 5 MIN PRN
Status: DISCONTINUED | OUTPATIENT
Start: 2024-06-27 | End: 2024-06-27 | Stop reason: HOSPADM

## 2024-06-27 RX ORDER — SODIUM CHLORIDE 9 MG/ML
INJECTION, SOLUTION INTRAVENOUS CONTINUOUS
Status: DISCONTINUED | OUTPATIENT
Start: 2024-06-27 | End: 2024-06-28 | Stop reason: HOSPADM

## 2024-06-27 RX ORDER — SODIUM CHLORIDE 0.9 % (FLUSH) 0.9 %
5-40 SYRINGE (ML) INJECTION EVERY 12 HOURS SCHEDULED
Status: DISCONTINUED | OUTPATIENT
Start: 2024-06-27 | End: 2024-06-27 | Stop reason: HOSPADM

## 2024-06-27 RX ORDER — LOSARTAN POTASSIUM 100 MG/1
100 TABLET ORAL DAILY
Status: DISCONTINUED | OUTPATIENT
Start: 2024-06-27 | End: 2024-06-28 | Stop reason: HOSPADM

## 2024-06-27 RX ORDER — DEXTROSE MONOHYDRATE 100 MG/ML
INJECTION, SOLUTION INTRAVENOUS CONTINUOUS PRN
Status: DISCONTINUED | OUTPATIENT
Start: 2024-06-27 | End: 2024-06-28 | Stop reason: HOSPADM

## 2024-06-27 RX ORDER — SODIUM CHLORIDE 0.9 % (FLUSH) 0.9 %
5-40 SYRINGE (ML) INJECTION EVERY 12 HOURS SCHEDULED
Status: DISCONTINUED | OUTPATIENT
Start: 2024-06-27 | End: 2024-06-28 | Stop reason: HOSPADM

## 2024-06-27 RX ORDER — ACETAMINOPHEN 325 MG/1
650 TABLET ORAL
Status: DISCONTINUED | OUTPATIENT
Start: 2024-06-27 | End: 2024-06-27 | Stop reason: HOSPADM

## 2024-06-27 RX ORDER — PROCHLORPERAZINE EDISYLATE 5 MG/ML
5 INJECTION INTRAMUSCULAR; INTRAVENOUS
Status: DISCONTINUED | OUTPATIENT
Start: 2024-06-27 | End: 2024-06-27 | Stop reason: HOSPADM

## 2024-06-27 RX ORDER — GLUCAGON 1 MG/ML
1 KIT INJECTION PRN
Status: DISCONTINUED | OUTPATIENT
Start: 2024-06-27 | End: 2024-06-28 | Stop reason: HOSPADM

## 2024-06-27 RX ORDER — ATORVASTATIN CALCIUM 20 MG/1
20 TABLET, FILM COATED ORAL DAILY
Status: DISCONTINUED | OUTPATIENT
Start: 2024-06-27 | End: 2024-06-28 | Stop reason: HOSPADM

## 2024-06-27 RX ORDER — ONDANSETRON 4 MG/1
4 TABLET, ORALLY DISINTEGRATING ORAL EVERY 8 HOURS PRN
Status: DISCONTINUED | OUTPATIENT
Start: 2024-06-27 | End: 2024-06-28 | Stop reason: HOSPADM

## 2024-06-27 RX ORDER — INSULIN LISPRO 100 [IU]/ML
0-4 INJECTION, SOLUTION INTRAVENOUS; SUBCUTANEOUS
Status: DISCONTINUED | OUTPATIENT
Start: 2024-06-27 | End: 2024-06-28 | Stop reason: HOSPADM

## 2024-06-27 RX ORDER — SODIUM CHLORIDE, SODIUM LACTATE, POTASSIUM CHLORIDE, CALCIUM CHLORIDE 600; 310; 30; 20 MG/100ML; MG/100ML; MG/100ML; MG/100ML
INJECTION, SOLUTION INTRAVENOUS CONTINUOUS
Status: DISCONTINUED | OUTPATIENT
Start: 2024-06-27 | End: 2024-06-27 | Stop reason: HOSPADM

## 2024-06-27 RX ORDER — MORPHINE SULFATE 4 MG/ML
4 INJECTION INTRAVENOUS
Status: DISCONTINUED | OUTPATIENT
Start: 2024-06-27 | End: 2024-06-28 | Stop reason: SDUPTHER

## 2024-06-27 RX ORDER — OXYCODONE HYDROCHLORIDE 5 MG/1
10 TABLET ORAL EVERY 4 HOURS PRN
Status: DISCONTINUED | OUTPATIENT
Start: 2024-06-27 | End: 2024-06-28 | Stop reason: HOSPADM

## 2024-06-27 RX ORDER — ONDANSETRON 2 MG/ML
INJECTION INTRAMUSCULAR; INTRAVENOUS PRN
Status: DISCONTINUED | OUTPATIENT
Start: 2024-06-27 | End: 2024-06-27 | Stop reason: SDUPTHER

## 2024-06-27 RX ORDER — ENOXAPARIN SODIUM 100 MG/ML
30 INJECTION SUBCUTANEOUS 2 TIMES DAILY
Status: DISCONTINUED | OUTPATIENT
Start: 2024-06-28 | End: 2024-06-28 | Stop reason: HOSPADM

## 2024-06-27 RX ORDER — LABETALOL HYDROCHLORIDE 5 MG/ML
10 INJECTION, SOLUTION INTRAVENOUS
Status: DISCONTINUED | OUTPATIENT
Start: 2024-06-27 | End: 2024-06-27 | Stop reason: HOSPADM

## 2024-06-27 RX ORDER — HYDRALAZINE HYDROCHLORIDE 20 MG/ML
10 INJECTION INTRAMUSCULAR; INTRAVENOUS EVERY 6 HOURS PRN
Status: DISCONTINUED | OUTPATIENT
Start: 2024-06-27 | End: 2024-06-28 | Stop reason: HOSPADM

## 2024-06-27 RX ORDER — ONDANSETRON 2 MG/ML
4 INJECTION INTRAMUSCULAR; INTRAVENOUS EVERY 6 HOURS PRN
Status: DISCONTINUED | OUTPATIENT
Start: 2024-06-27 | End: 2024-06-28 | Stop reason: HOSPADM

## 2024-06-27 RX ORDER — GABAPENTIN 300 MG/1
300 CAPSULE ORAL 2 TIMES DAILY
Status: DISCONTINUED | OUTPATIENT
Start: 2024-06-27 | End: 2024-06-27

## 2024-06-27 RX ORDER — INSULIN LISPRO 100 [IU]/ML
0-4 INJECTION, SOLUTION INTRAVENOUS; SUBCUTANEOUS NIGHTLY
Status: DISCONTINUED | OUTPATIENT
Start: 2024-06-27 | End: 2024-06-28 | Stop reason: HOSPADM

## 2024-06-27 RX ORDER — IPRATROPIUM BROMIDE AND ALBUTEROL SULFATE 2.5; .5 MG/3ML; MG/3ML
1 SOLUTION RESPIRATORY (INHALATION)
Status: DISCONTINUED | OUTPATIENT
Start: 2024-06-27 | End: 2024-06-27 | Stop reason: HOSPADM

## 2024-06-27 RX ORDER — FENTANYL CITRATE 50 UG/ML
INJECTION, SOLUTION INTRAMUSCULAR; INTRAVENOUS PRN
Status: DISCONTINUED | OUTPATIENT
Start: 2024-06-27 | End: 2024-06-27 | Stop reason: SDUPTHER

## 2024-06-27 RX ORDER — HYDROMORPHONE HYDROCHLORIDE 1 MG/ML
0.5 INJECTION, SOLUTION INTRAMUSCULAR; INTRAVENOUS; SUBCUTANEOUS EVERY 5 MIN PRN
Status: DISCONTINUED | OUTPATIENT
Start: 2024-06-27 | End: 2024-06-27 | Stop reason: HOSPADM

## 2024-06-27 RX ORDER — GLYCOPYRROLATE 0.2 MG/ML
INJECTION INTRAMUSCULAR; INTRAVENOUS PRN
Status: DISCONTINUED | OUTPATIENT
Start: 2024-06-27 | End: 2024-06-27 | Stop reason: SDUPTHER

## 2024-06-27 RX ORDER — TAMSULOSIN HYDROCHLORIDE 0.4 MG/1
0.4 CAPSULE ORAL DAILY
Status: DISCONTINUED | OUTPATIENT
Start: 2024-06-27 | End: 2024-06-28 | Stop reason: HOSPADM

## 2024-06-27 RX ORDER — SODIUM CHLORIDE 0.9 % (FLUSH) 0.9 %
5-40 SYRINGE (ML) INJECTION PRN
Status: DISCONTINUED | OUTPATIENT
Start: 2024-06-27 | End: 2024-06-27 | Stop reason: HOSPADM

## 2024-06-27 RX ORDER — MIDAZOLAM HYDROCHLORIDE 1 MG/ML
INJECTION INTRAMUSCULAR; INTRAVENOUS PRN
Status: DISCONTINUED | OUTPATIENT
Start: 2024-06-27 | End: 2024-06-27 | Stop reason: SDUPTHER

## 2024-06-27 RX ORDER — LIDOCAINE HYDROCHLORIDE 20 MG/ML
INJECTION, SOLUTION INTRAVENOUS PRN
Status: DISCONTINUED | OUTPATIENT
Start: 2024-06-27 | End: 2024-06-27 | Stop reason: SDUPTHER

## 2024-06-27 RX ORDER — METOPROLOL SUCCINATE 25 MG/1
25 TABLET, EXTENDED RELEASE ORAL DAILY
Status: DISCONTINUED | OUTPATIENT
Start: 2024-06-27 | End: 2024-06-28 | Stop reason: HOSPADM

## 2024-06-27 RX ORDER — FAMOTIDINE 10 MG/ML
INJECTION, SOLUTION INTRAVENOUS PRN
Status: DISCONTINUED | OUTPATIENT
Start: 2024-06-27 | End: 2024-06-27 | Stop reason: SDUPTHER

## 2024-06-27 RX ORDER — OXYCODONE HYDROCHLORIDE 5 MG/1
5 TABLET ORAL EVERY 4 HOURS PRN
Status: DISCONTINUED | OUTPATIENT
Start: 2024-06-27 | End: 2024-06-28 | Stop reason: HOSPADM

## 2024-06-27 RX ORDER — NALOXONE HYDROCHLORIDE 0.4 MG/ML
INJECTION, SOLUTION INTRAMUSCULAR; INTRAVENOUS; SUBCUTANEOUS PRN
Status: DISCONTINUED | OUTPATIENT
Start: 2024-06-27 | End: 2024-06-27 | Stop reason: HOSPADM

## 2024-06-27 RX ORDER — ROPIVACAINE HYDROCHLORIDE 5 MG/ML
INJECTION, SOLUTION EPIDURAL; INFILTRATION; PERINEURAL
Status: DISPENSED
Start: 2024-06-27 | End: 2024-06-27

## 2024-06-27 RX ORDER — ONDANSETRON 2 MG/ML
4 INJECTION INTRAMUSCULAR; INTRAVENOUS
Status: DISCONTINUED | OUTPATIENT
Start: 2024-06-27 | End: 2024-06-27 | Stop reason: HOSPADM

## 2024-06-27 RX ORDER — MORPHINE SULFATE 4 MG/ML
2 INJECTION INTRAVENOUS
Status: DISCONTINUED | OUTPATIENT
Start: 2024-06-27 | End: 2024-06-28 | Stop reason: SDUPTHER

## 2024-06-27 RX ORDER — PROPOFOL 10 MG/ML
INJECTION, EMULSION INTRAVENOUS PRN
Status: DISCONTINUED | OUTPATIENT
Start: 2024-06-27 | End: 2024-06-27 | Stop reason: SDUPTHER

## 2024-06-27 RX ORDER — BUPIVACAINE HYDROCHLORIDE 5 MG/ML
INJECTION, SOLUTION EPIDURAL; INTRACAUDAL
Status: COMPLETED | OUTPATIENT
Start: 2024-06-27 | End: 2024-06-27

## 2024-06-27 RX ORDER — ROCURONIUM BROMIDE 10 MG/ML
INJECTION, SOLUTION INTRAVENOUS PRN
Status: DISCONTINUED | OUTPATIENT
Start: 2024-06-27 | End: 2024-06-27 | Stop reason: SDUPTHER

## 2024-06-27 RX ORDER — PHENYLEPHRINE HYDROCHLORIDE 10 MG/ML
INJECTION INTRAVENOUS PRN
Status: DISCONTINUED | OUTPATIENT
Start: 2024-06-27 | End: 2024-06-27 | Stop reason: SDUPTHER

## 2024-06-27 RX ADMIN — FENTANYL CITRATE 50 MCG: 50 INJECTION, SOLUTION INTRAMUSCULAR; INTRAVENOUS at 08:03

## 2024-06-27 RX ADMIN — LIDOCAINE HYDROCHLORIDE 100 MG: 20 INJECTION, SOLUTION INTRAVENOUS at 07:36

## 2024-06-27 RX ADMIN — ONDANSETRON 4 MG: 2 INJECTION INTRAMUSCULAR; INTRAVENOUS at 07:36

## 2024-06-27 RX ADMIN — DEXAMETHASONE SODIUM PHOSPHATE 4 MG: 4 INJECTION INTRA-ARTICULAR; INTRALESIONAL; INTRAMUSCULAR; INTRAVENOUS; SOFT TISSUE at 07:52

## 2024-06-27 RX ADMIN — FENTANYL CITRATE 50 MCG: 50 INJECTION, SOLUTION INTRAMUSCULAR; INTRAVENOUS at 07:36

## 2024-06-27 RX ADMIN — CLINDAMYCIN PHOSPHATE 600 MG: 600 INJECTION, SOLUTION INTRAVENOUS at 22:16

## 2024-06-27 RX ADMIN — SODIUM CHLORIDE, POTASSIUM CHLORIDE, SODIUM LACTATE AND CALCIUM CHLORIDE: 600; 310; 30; 20 INJECTION, SOLUTION INTRAVENOUS at 07:03

## 2024-06-27 RX ADMIN — LIDOCAINE HYDROCHLORIDE 100 MG: 20 INJECTION, SOLUTION INTRAVENOUS at 09:17

## 2024-06-27 RX ADMIN — Medication 20 MG: at 08:19

## 2024-06-27 RX ADMIN — INSULIN LISPRO 1 UNITS: 100 INJECTION, SOLUTION INTRAVENOUS; SUBCUTANEOUS at 17:36

## 2024-06-27 RX ADMIN — OXYCODONE 10 MG: 5 TABLET ORAL at 18:52

## 2024-06-27 RX ADMIN — HYDROMORPHONE HYDROCHLORIDE 0.5 MG: 1 INJECTION, SOLUTION INTRAMUSCULAR; INTRAVENOUS; SUBCUTANEOUS at 14:35

## 2024-06-27 RX ADMIN — ATORVASTATIN CALCIUM 20 MG: 20 TABLET, FILM COATED ORAL at 17:34

## 2024-06-27 RX ADMIN — FAMOTIDINE 20 MG: 10 INJECTION, SOLUTION INTRAVENOUS at 07:52

## 2024-06-27 RX ADMIN — CLINDAMYCIN PHOSPHATE 600 MG: 600 INJECTION, SOLUTION INTRAVENOUS at 17:29

## 2024-06-27 RX ADMIN — TAMSULOSIN HYDROCHLORIDE 0.4 MG: 0.4 CAPSULE ORAL at 17:34

## 2024-06-27 RX ADMIN — BUPIVACAINE HYDROCHLORIDE 30 ML: 5 INJECTION, SOLUTION EPIDURAL; INTRACAUDAL; PERINEURAL at 09:46

## 2024-06-27 RX ADMIN — HYDROMORPHONE HYDROCHLORIDE 0.5 MG: 1 INJECTION, SOLUTION INTRAMUSCULAR; INTRAVENOUS; SUBCUTANEOUS at 14:41

## 2024-06-27 RX ADMIN — PROPOFOL 50 MG: 10 INJECTION, EMULSION INTRAVENOUS at 07:37

## 2024-06-27 RX ADMIN — VANCOMYCIN HYDROCHLORIDE 1750 MG: 10 INJECTION, POWDER, LYOPHILIZED, FOR SOLUTION INTRAVENOUS at 07:48

## 2024-06-27 RX ADMIN — FENTANYL CITRATE 25 MCG: 50 INJECTION, SOLUTION INTRAMUSCULAR; INTRAVENOUS at 09:52

## 2024-06-27 RX ADMIN — OXYCODONE 10 MG: 5 TABLET ORAL at 14:35

## 2024-06-27 RX ADMIN — DEXMEDETOMIDINE HYDROCHLORIDE 6 MCG: 100 INJECTION, SOLUTION INTRAVENOUS at 09:52

## 2024-06-27 RX ADMIN — GABAPENTIN 300 MG: 300 CAPSULE ORAL at 19:53

## 2024-06-27 RX ADMIN — SUGAMMADEX 200 MG: 100 INJECTION, SOLUTION INTRAVENOUS at 09:50

## 2024-06-27 RX ADMIN — LOSARTAN POTASSIUM 100 MG: 100 TABLET, FILM COATED ORAL at 17:34

## 2024-06-27 RX ADMIN — MIDAZOLAM HYDROCHLORIDE 2 MG: 2 INJECTION, SOLUTION INTRAMUSCULAR; INTRAVENOUS at 07:26

## 2024-06-27 RX ADMIN — ROCURONIUM BROMIDE 60 MG: 10 INJECTION, SOLUTION INTRAVENOUS at 07:37

## 2024-06-27 RX ADMIN — METOPROLOL SUCCINATE 25 MG: 25 TABLET, EXTENDED RELEASE ORAL at 17:34

## 2024-06-27 RX ADMIN — SODIUM CHLORIDE: 9 INJECTION, SOLUTION INTRAVENOUS at 10:19

## 2024-06-27 RX ADMIN — SODIUM CHLORIDE, POTASSIUM CHLORIDE, SODIUM LACTATE AND CALCIUM CHLORIDE: 600; 310; 30; 20 INJECTION, SOLUTION INTRAVENOUS at 09:07

## 2024-06-27 RX ADMIN — PHENYLEPHRINE HYDROCHLORIDE 100 MCG: 10 INJECTION, SOLUTION INTRAVENOUS at 07:59

## 2024-06-27 RX ADMIN — OXYCODONE 10 MG: 5 TABLET ORAL at 23:15

## 2024-06-27 RX ADMIN — GLYCOPYRROLATE 0.2 MG: 0.2 INJECTION INTRAMUSCULAR; INTRAVENOUS at 07:46

## 2024-06-27 RX ADMIN — PROPOFOL 150 MG: 10 INJECTION, EMULSION INTRAVENOUS at 07:36

## 2024-06-27 ASSESSMENT — PAIN DESCRIPTION - ORIENTATION
ORIENTATION: LEFT

## 2024-06-27 ASSESSMENT — PAIN SCALES - GENERAL
PAINLEVEL_OUTOF10: 0
PAINLEVEL_OUTOF10: 2
PAINLEVEL_OUTOF10: 7
PAINLEVEL_OUTOF10: 0
PAINLEVEL_OUTOF10: 4
PAINLEVEL_OUTOF10: 0
PAINLEVEL_OUTOF10: 8
PAINLEVEL_OUTOF10: 0
PAINLEVEL_OUTOF10: 4
PAINLEVEL_OUTOF10: 4
PAINLEVEL_OUTOF10: 0
PAINLEVEL_OUTOF10: 4

## 2024-06-27 ASSESSMENT — PAIN DESCRIPTION - ONSET
ONSET: ON-GOING

## 2024-06-27 ASSESSMENT — PAIN DESCRIPTION - DESCRIPTORS
DESCRIPTORS: DULL
DESCRIPTORS: DISCOMFORT
DESCRIPTORS: ACHING;SORE
DESCRIPTORS: ACHING
DESCRIPTORS: ACHING;SORE
DESCRIPTORS: ACHING

## 2024-06-27 ASSESSMENT — PAIN DESCRIPTION - FREQUENCY
FREQUENCY: CONTINUOUS

## 2024-06-27 ASSESSMENT — PAIN DESCRIPTION - PAIN TYPE
TYPE: SURGICAL PAIN
TYPE: CHRONIC PAIN
TYPE: SURGICAL PAIN

## 2024-06-27 ASSESSMENT — PAIN DESCRIPTION - LOCATION
LOCATION: FOOT

## 2024-06-27 ASSESSMENT — PAIN - FUNCTIONAL ASSESSMENT
PAIN_FUNCTIONAL_ASSESSMENT: PREVENTS OR INTERFERES SOME ACTIVE ACTIVITIES AND ADLS
PAIN_FUNCTIONAL_ASSESSMENT: ACTIVITIES ARE NOT PREVENTED
PAIN_FUNCTIONAL_ASSESSMENT: PREVENTS OR INTERFERES SOME ACTIVE ACTIVITIES AND ADLS
PAIN_FUNCTIONAL_ASSESSMENT: PREVENTS OR INTERFERES SOME ACTIVE ACTIVITIES AND ADLS

## 2024-06-27 NOTE — PROGRESS NOTES
Pt received from OR to PACU # 9 via stretcher.     Post: Procedure(s):  LEFT REPAIR OF PERONEUS LONGUS TENDON ,  TENDON TRANSFER     Report received from OR RN and KATTY White CRNA.    Per report pt did well, received a nerve block post op.    Respirations reg and easy.  Pt is drowsy.       Attached to PACU monitoring system. Alarms and parameters set    Pain none noted and no complaints of nausea.

## 2024-06-27 NOTE — PROGRESS NOTES
4 Eyes Skin Assessment     NAME:  Josias Shafer  YOB: 1965  MEDICAL RECORD NUMBER:  [id number]    The patient is being assessed for  Admission    I agree that at least one RN has performed a thorough Head to Toe Skin Assessment on the patient. ALL assessment sites listed below have been assessed.      Areas assessed by both nurses:    Head, Face, Ears, Shoulders, Back, Chest, Arms, Elbows, Hands, Sacrum. Buttock, Coccyx, Ischium, Legs. Feet and Heels, and Under Medical Devices         Does the Patient have a Wound? No noted wound(s)     Surgical incision to left foot covered with ACE  Jamie Prevention initiated by RN: No  Wound Care Orders initiated by RN: No    Pressure Injury (Stage 3,4, Unstageable, DTI, NWPT, and Complex wounds) if present, place Wound referral order by RN under : No    New Ostomies, if present place, Ostomy referral order under : No     Nurse 1 eSignature: Electronically signed by Ana Luisa Justice RN on 6/27/24 at 3:36 PM EDT    **SHARE this note so that the co-signing nurse can place an eSignature**    Nurse 2 eSignature: Electronically signed by Delma Gardner RN on 6/27/24 at 5:02 PM EDT

## 2024-06-27 NOTE — OP NOTE
[address] [address]                            OPERATIVE REPORT      PATIENT NAME: ANDREW GARCIA        : 1965  MED REC NO: [id number]                      ROOM: OR  ACCOUNT NO: [id number]                       ADMIT DATE: 2024  PROVIDER: Damian Davidson MD      DATE OF PROCEDURE:  2024    SURGEON:  Damian Davidson MD    PREOPERATIVE DIAGNOSES:    1. Left peroneus longus tendon tear.  2. Status post repair, re-rupture secondary to repeat fall.    POSTOPERATIVE DIAGNOSES:    1. Left peroneus longus tendon tear.  2. Status post repair, re-rupture secondary to repeat fall.    OPERATIONS:  Left excision of peroneus longus tendon and flexor hallucis longus tendon transfer.    ANESTHETIC:  General with block.    INDICATIONS:  This is a 58-year-old gentleman, who has sustained a rupture of his peroneus longus tendon while at work.  There was some delay in repairing the patient's tendon, primarily due to approval through his insurance through work.  The patient eventually was approved for surgery and had his tendon repaired approximately 7 days previously.  The patient was unable to comply with weightbearing restrictions and was walking on the extremity while at home.  Apparently, he was in the bathroom and slipped and fell and felt the area pop.  He presented to our office earlier this week, where x-rays revealed recurrent rupture of the repaired peroneus longus.  This was identified by proximal retraction of the os peroneus which had been repaired anatomically at our previous surgical procedure.  The risks and potential benefits of the procedure were discussed at length with the patient.  He understands these, was given the opportunity to ask questions.  His questions were answered to his satisfaction.  He has given consent to proceed with the above-outlined procedure.  We will plan on  admitting the patient to the hospital for occupational and physical therapy.  It is possible that the patient requires subacute nursing facility.    OPERATIVE PROCEDURE:  The patient was brought to the operating room and placed in the supine position on the operating table.  After induction of a general anesthetic, he was turned to a lateral decubitus position on a bean bag patient positioner.  A gel axillary roll was applied, and the head and arms were carefully positioned by the Anesthesia team.  The patient's left leg was then prepped and draped free in the usual sterile fashion.    An extra-large HemaClear tourniquet was utilized throughout the procedure.  The patient's leg was measured at this point, and the length from the tips of the toes to the most proximal aspect of the proximal thigh was noted to be 70 cm.  This was marked and the circumference measured to be 59 cm.  The limb at this point was exsanguinated with the extra-large HemaClear tourniquet, and the tourniquet was brought to the 59 cm naila giving us a calibrated tourniquet pressure of 315 mmHg.  Total tourniquet time was 98 minutes.    At this point, the previous incision was re-incised.  Dissection was carried out through the subcutaneous and deeper tissue, and care was taken to identify and protect the sural nerve as it crossed the surgical field at the level of the peroneal tubercle.  This was protected throughout the procedure.  The peroneal retinaculum was incised and dissection carried into the peroneal tendon sheath.  The peroneus longus tendon was noted to be completely ruptured with the os peroneus retracted to the level of the fibula.  The tendon was shredded proximally and distally as the sutures had pulled out of the tendon, and this was felt at this point not to be repairable.  The tendon was therefore excised with the degenerative os peroneus.  The decision at this point was made to proceed with a flexor hallucis longus tendon  excellent balance of the patient's foot in inversion and eversion and nicely resolved the patient's underlying varus resting hindfoot deformity.  At this point, the wounds were irrigated with a sterile lavage solution and closed in layers.  The peroneal retinaculum closed with 2-0 PDS suture.  The subcutaneous tissues reapproximated with 3-0 Vicryl suture.  The skin edges reapproximated with 3-0 nylon.    There were no drains and no complications.  The sponge and needle counts were noted to be correct at the end of the procedure by the nursing staff.    ESTIMATED BLOOD LOSS:  Minimal due to use of a tourniquet.          GYPSY NDIAYE MD      D:  06/27/2024 12:39:25     T:  06/27/2024 13:31:00     KRISTIN/ARIADNA  Job #:  [id number]     Doc#:  [id number]

## 2024-06-27 NOTE — CONSULTS
Hospitalist Consultation Note    Patient's PCP: Jerzy Tsai MD     Requesting Physician: Damian Freed,*    Reason for Consultation: Assistance with perioperative medical management    HISTORY OF PRESENT ILLNESS:    This is a very pleasant 58 y.o. male with a history of hypertension, diabetes mellitus type 2, obstructive sleep apnea on CPAP, tobacco abuse, and morbid obesity who we have been asked to see for assistance with preoperative management.    He was admitted 6/27/2024 for revision of previous surgery on his left foot, with strain of peroneal tendon of left foot with Left peroneus longus tendon tear.  Appears he had fallen prior, had surgery, and appears failed again with re-rupture.    He had Left excision of peroneus longus tendon and flexor hallucis longus tendon transfer under general anesthesia with block. Estimated blood loss was felt to be minimal due to use of tourniquet.    Prior to, and since surgery, he has not had any chest pain, shortness of breath, dizziness or lightheadedness.    Has no hx of CAD or CHF.     He is on Jardiance for his diabetes which was stopped preoperatively.    He had been placed on Xarelto for DVT prophylaxis following his initial surgery, which was stopped for this surgery.    His blood pressures have been noted to be high postoperatively with systolic in the 180s.    He otherwise denies symptoms.    I have reviewed his medical history, and discussed his care with nursing staff.      Past Medical / Surgical History:    Past Medical History:   Diagnosis Date    Arthritis     Diabetes mellitus type 2 in obese     DM type 2 (diabetes mellitus, type 2) (Formerly [facility])     HTN     HTN (hypertension)      replace inactive diagnosis    Lymphedema     Morbid obesity with BMI of 50.0-59.9, adult (Formerly [facility])     Neuropathy     feet    Obesity     HANNAH (obstructive sleep apnea) 04/19/2016    bipap    Stasis dermatitis of both legs     Tobacco abuse, continuous         Past Surgical History:   Procedure Laterality Date    ANKLE SURGERY Left 6/27/2024    LEFT REPAIR OF PERONEUS LONGUS TENDON ,  TENDON TRANSFER performed by Damian Davidson MD at [facility] OR    APPENDECTOMY      COLONOSCOPY      KNEE ARTHROSCOPY      OTHER SURGICAL HISTORY  12/14/2016    LAPAROSCOPIC SLEEVE GASTRECTOMY           UPPER GASTROINTESTINAL ENDOSCOPY  1/15/16       Medications Prior to Admission:    No current facility-administered medications on file prior to encounter.     Current Outpatient Medications on File Prior to Encounter   Medication Sig Dispense Refill    XARELTO 10 MG TABS tablet Take 1 tablet by mouth daily      HYDROcodone-acetaminophen (NORCO) 5-325 MG per tablet Take 1-2 tablets by mouth every 4-6 hours as needed for Pain.      metoprolol succinate (TOPROL XL) 25 MG extended release tablet Take 1 tablet by mouth daily 90 tablet 3    ONETOUCH ULTRA strip USE 1 STRIP  DAILY AS NEEDED 100 strip 3    losartan (COZAAR) 100 MG tablet Take 1 tablet by mouth daily 90 tablet 1    metFORMIN (GLUCOPHAGE-XR) 500 MG extended release tablet Take 1 tablet by mouth daily (with breakfast) 90 tablet 1    empagliflozin (JARDIANCE) 25 MG tablet Take 1 tablet by mouth daily 90 tablet 3    atorvastatin (LIPITOR) 20 MG tablet Take 1 tablet by mouth daily 90 tablet 3    tadalafil (CIALIS) 20 MG tablet Take 1 tablet by mouth daily as needed for Erectile Dysfunction 10 tablet 0    tamsulosin (FLOMAX) 0.4 MG capsule TAKE 1 CAPSULE DAILY 90 capsule 3    [DISCONTINUED] ibuprofen (ADVIL;MOTRIN) 800 MG tablet Take 1 tablet by mouth 3 times daily (with meals) 90 tablet 0    Multiple Vitamins-Minerals (BARIATRIC FUSION) CHEW Take 3 tablets by mouth daily         Allergies:  Lisinopril and Amoxicillin-pot clavulanate    Social History:   TOBACCO:   reports that he has been smoking cigarettes. He started smoking about 47 years ago. He has a 45.2 pack-year smoking history. He has never used smokeless tobacco.

## 2024-06-27 NOTE — PROGRESS NOTES
1200 Patient meets criteria to transfer to floor per Abby Score. There are no available beds at this time.  Placed in CLINICAL DISCHARGE.      Vitals changed to every hour and Head - to - toe assessments changed to every 2 hours.  Family updated.    In: 2300 [I.V.:1800]  Out: 50     Vitals:    06/27/24 1200   BP: (!) 162/82   Pulse: 64   Resp: (!) 8   Temp: 97.8 °F (36.6 °C)   SpO2: 100%      VS meet phase one DC instructions

## 2024-06-27 NOTE — ANESTHESIA PROCEDURE NOTES
Peripheral Block    Patient location during procedure: pre-op  Reason for block: post-op pain management and at surgeon's request  Start time: 6/27/2024 9:44 AM  End time: 6/27/2024 9:46 AM  Staffing  Performed: resident/CRNA   Anesthesiologist: Luis Gordon MD  Resident/CRNA: Trevon White APRN - CRNA  Performed by: Trevon White APRN - CRNA  Authorized by: Luis Gordon MD    Preanesthetic Checklist  Completed: patient identified, IV checked, site marked, risks and benefits discussed, surgical/procedural consents, equipment checked, pre-op evaluation, timeout performed, anesthesia consent given, oxygen available, monitors applied/VS acknowledged, fire risk safety assessment completed and verbalized and blood product R/B/A discussed and consented  Peripheral Block   Patient position: supine  Prep: ChloraPrep  Provider prep: mask and sterile gloves  Patient monitoring: cardiac monitor, continuous pulse ox, continuous capnometry, frequent blood pressure checks, IV access, oxygen and responsive to questions  Block type: Sciatic  Popliteal  Laterality: left  Injection technique: single-shot  Guidance: ultrasound guided    Needle   Needle type: insulated echogenic nerve stimulator needle   Needle gauge: 20 G  Needle localization: anatomical landmarks and ultrasound guidance  Needle length: 8 cm  Assessment   Injection assessment: negative aspiration for heme, no paresthesia on injection, local visualized surrounding nerve on ultrasound and no intravascular symptoms  Paresthesia pain: none  Slow fractionated injection: yes  Hemodynamics: stable  Outcomes: uncomplicated and patient tolerated procedure well    Additional Notes  Time out: 2812    Local anesthetic injected in 2-3 mL increments following negative aspiration   Medications Administered  BUPivacaine (MARCAINE) PF injection 0.5% - Perineural   30 mL - 6/27/2024 9:46:00 AM

## 2024-06-27 NOTE — PROGRESS NOTES
Per HANY Sheldon pt okay to use rolling scooter as long as NWB status maintained. Pt educated on precautions. Plan of care continues.

## 2024-06-27 NOTE — PROGRESS NOTES
Pt meets criteria for transfer to floor, room not available.      Pain none, resp reg and easy, denies c/o nausea.    Vital signs stable    Will place in Clinical discharge at this time and change VS to every 1 hour and assessment every 2 hours.

## 2024-06-27 NOTE — BRIEF OP NOTE
Brief Postoperative Note      Patient: Josias Shafer  YOB: 1965  MRN: [id number]    Date of Procedure: 6/27/2024    Pre-Op Diagnosis Codes:     * Strain of peroneal tendon of left foot, initial encounter [S86.312A]    Post-Op Diagnosis: Same       Operation:  FHL Tendon Transfer    Surgeon(s):  Damian Davidson MD    Assistant:  Surgical Assistant: Obey Malhotra; Peter Meza    Anesthesia: General    Estimated Blood Loss (mL): Minimal    Complications: None    Specimens:   ID Type Source Tests Collected by Time Destination   A : PERONEUS LONGUS TENDON Tissue Tissue SURGICAL PATHOLOGY Damian Davidson MD 6/27/2024 0910        Implants:  * No implants in log *      Drains: * No LDAs found *    Findings:  Infection Present At Time Of Surgery (PATOS) (choose all levels that have infection present):  No infection present      Electronically signed by Damian Davidson MD on 6/27/2024 at 9:36 AM

## 2024-06-27 NOTE — ANESTHESIA POSTPROCEDURE EVALUATION
Department of Anesthesiology  Postprocedure Note    Patient: Josias Shafer  MRN: [id number]  YOB: 1965  Date of evaluation: 6/27/2024    Procedure Summary       Date: 06/27/24 Room / Location: [place] [address] / [facility]    Anesthesia Start: 0730 Anesthesia Stop: 1004    Procedure: LEFT REPAIR OF PERONEUS LONGUS TENDON ,  TENDON TRANSFER (Left) Diagnosis:       Strain of peroneal tendon of left foot, initial encounter      (Strain of peroneal tendon of left foot, initial encounter [S86.312A])    Surgeons: Damian Davidson MD Responsible Provider: Luis Gordon MD    Anesthesia Type: general ASA Status: 3            Anesthesia Type: No value filed.    Abby Phase I: Abby Score: 8    Abby Phase II:      Anesthesia Post Evaluation    Patient location during evaluation: PACU  Patient participation: complete - patient participated  Level of consciousness: awake  Pain score: 0  Nausea & Vomiting: no nausea and no vomiting  Cardiovascular status: blood pressure returned to baseline  Respiratory status: acceptable  Hydration status: euvolemic  Pain management: adequate    No notable events documented.

## 2024-06-27 NOTE — PROGRESS NOTES
PACU Transfer to [facility]    Vitals:    06/27/24 1500   BP: (!) 144/76   Pulse: 72   Resp: 20   Temp: 97.6 °F (36.4 °C)   SpO2: 97%         Intake/Output Summary (Last 24 hours) at 6/27/2024 1519  Last data filed at 6/27/2024 1500  Gross per 24 hour   Intake 3147 ml   Output 1850 ml   Net 1297 ml       Pain assessment:  none  Pain Level: 0 (None noted, resting with eyes closed)    Patient transferred via stretcher per Farida to care of Ana Luisa BRANHAM RN.

## 2024-06-27 NOTE — PROGRESS NOTES
Patient admitted to room 5520. Report received from JAYDEN Lopez. VSS on room air. Patient oriented to room and call light. Rights and responsibilities provided to patient, and welcome packet provided to patient. 4 eyes skin assessment completed with 2nd RN.

## 2024-06-27 NOTE — ANESTHESIA PRE PROCEDURE
Pulmonary: breath sounds clear to auscultation  (+)     sleep apnea: on CPAP,                                  Cardiovascular:  Exercise tolerance: good (>4 METS)  (+) hypertension:        Rhythm: regular  Rate: normal                    Neuro/Psych:               GI/Hepatic/Renal:             Endo/Other:    (+) DiabetesType II DM.                 Abdominal:   (+) obese          Vascular:          Other Findings:       Anesthesia Plan      general     ASA 3     (Consented for postop block)  Induction: intravenous.      Anesthetic plan and risks discussed with patient.    Use of blood products discussed with patient whom.    Plan discussed with CRNA and surgical team.                Luis Gordon MD   6/27/2024

## 2024-06-27 NOTE — H&P
Date of Surgery Update:  Josias BURLESON Soni was seen, history and physical examination reviewed, and patient examined by me today. There have been no significant clinical changes since the completion of the previous history and physical.    The risk, benefits, and alternatives of the proposed procedure have been explained to the patient (or appropriate guardian) and understanding verbalized. All questions answered. Patient wishes to proceed.    Electronically signed by: Damian Davidson MD,6/27/2024,9:36 AM

## 2024-06-28 VITALS
HEART RATE: 69 BPM | HEIGHT: 70 IN | BODY MASS INDEX: 36.99 KG/M2 | OXYGEN SATURATION: 98 % | RESPIRATION RATE: 16 BRPM | TEMPERATURE: 98.3 F | SYSTOLIC BLOOD PRESSURE: 127 MMHG | WEIGHT: 258.4 LBS | DIASTOLIC BLOOD PRESSURE: 75 MMHG

## 2024-06-28 LAB
GLUCOSE BLD-MCNC: 149 MG/DL (ref 70–99)
GLUCOSE BLD-MCNC: 193 MG/DL (ref 70–99)
PERFORMED ON: ABNORMAL
PERFORMED ON: ABNORMAL

## 2024-06-28 PROCEDURE — 97535 SELF CARE MNGMENT TRAINING: CPT

## 2024-06-28 PROCEDURE — 97116 GAIT TRAINING THERAPY: CPT

## 2024-06-28 PROCEDURE — 96374 THER/PROPH/DIAG INJ IV PUSH: CPT

## 2024-06-28 PROCEDURE — 6360000002 HC RX W HCPCS: Performed by: ORTHOPAEDIC SURGERY

## 2024-06-28 PROCEDURE — 96372 THER/PROPH/DIAG INJ SC/IM: CPT

## 2024-06-28 PROCEDURE — 6370000000 HC RX 637 (ALT 250 FOR IP): Performed by: INTERNAL MEDICINE

## 2024-06-28 PROCEDURE — 97530 THERAPEUTIC ACTIVITIES: CPT

## 2024-06-28 PROCEDURE — G0378 HOSPITAL OBSERVATION PER HR: HCPCS

## 2024-06-28 PROCEDURE — 2580000003 HC RX 258: Performed by: ORTHOPAEDIC SURGERY

## 2024-06-28 PROCEDURE — 6370000000 HC RX 637 (ALT 250 FOR IP): Performed by: ORTHOPAEDIC SURGERY

## 2024-06-28 PROCEDURE — 97166 OT EVAL MOD COMPLEX 45 MIN: CPT

## 2024-06-28 PROCEDURE — 6370000000 HC RX 637 (ALT 250 FOR IP): Performed by: NURSE PRACTITIONER

## 2024-06-28 RX ORDER — MORPHINE SULFATE 2 MG/ML
4 INJECTION, SOLUTION INTRAMUSCULAR; INTRAVENOUS
Status: DISCONTINUED | OUTPATIENT
Start: 2024-06-28 | End: 2024-06-28 | Stop reason: HOSPADM

## 2024-06-28 RX ORDER — MORPHINE SULFATE 2 MG/ML
2 INJECTION, SOLUTION INTRAMUSCULAR; INTRAVENOUS
Status: DISCONTINUED | OUTPATIENT
Start: 2024-06-28 | End: 2024-06-28 | Stop reason: HOSPADM

## 2024-06-28 RX ADMIN — ATORVASTATIN CALCIUM 20 MG: 20 TABLET, FILM COATED ORAL at 08:18

## 2024-06-28 RX ADMIN — CLINDAMYCIN PHOSPHATE 600 MG: 600 INJECTION, SOLUTION INTRAVENOUS at 06:14

## 2024-06-28 RX ADMIN — MORPHINE SULFATE 2 MG: 2 INJECTION, SOLUTION INTRAMUSCULAR; INTRAVENOUS at 02:53

## 2024-06-28 RX ADMIN — ENOXAPARIN SODIUM 30 MG: 100 INJECTION SUBCUTANEOUS at 10:13

## 2024-06-28 RX ADMIN — OXYCODONE 10 MG: 5 TABLET ORAL at 06:12

## 2024-06-28 RX ADMIN — GABAPENTIN 900 MG: 300 CAPSULE ORAL at 08:18

## 2024-06-28 RX ADMIN — TAMSULOSIN HYDROCHLORIDE 0.4 MG: 0.4 CAPSULE ORAL at 08:18

## 2024-06-28 RX ADMIN — OXYCODONE 10 MG: 5 TABLET ORAL at 15:31

## 2024-06-28 RX ADMIN — LOSARTAN POTASSIUM 100 MG: 100 TABLET, FILM COATED ORAL at 08:18

## 2024-06-28 RX ADMIN — METOPROLOL SUCCINATE 25 MG: 25 TABLET, EXTENDED RELEASE ORAL at 08:18

## 2024-06-28 RX ADMIN — SODIUM CHLORIDE, PRESERVATIVE FREE 10 ML: 5 INJECTION INTRAVENOUS at 08:18

## 2024-06-28 RX ADMIN — OXYCODONE 10 MG: 5 TABLET ORAL at 10:15

## 2024-06-28 ASSESSMENT — PAIN - FUNCTIONAL ASSESSMENT
PAIN_FUNCTIONAL_ASSESSMENT: PREVENTS OR INTERFERES SOME ACTIVE ACTIVITIES AND ADLS
PAIN_FUNCTIONAL_ASSESSMENT: ACTIVITIES ARE NOT PREVENTED
PAIN_FUNCTIONAL_ASSESSMENT: PREVENTS OR INTERFERES SOME ACTIVE ACTIVITIES AND ADLS

## 2024-06-28 ASSESSMENT — PAIN DESCRIPTION - ORIENTATION
ORIENTATION: LEFT

## 2024-06-28 ASSESSMENT — PAIN SCALES - GENERAL
PAINLEVEL_OUTOF10: 4
PAINLEVEL_OUTOF10: 6
PAINLEVEL_OUTOF10: 4
PAINLEVEL_OUTOF10: 7
PAINLEVEL_OUTOF10: 4
PAINLEVEL_OUTOF10: 6
PAINLEVEL_OUTOF10: 0
PAINLEVEL_OUTOF10: 7

## 2024-06-28 ASSESSMENT — PAIN DESCRIPTION - LOCATION
LOCATION: ANKLE
LOCATION: FOOT

## 2024-06-28 ASSESSMENT — PAIN DESCRIPTION - DESCRIPTORS
DESCRIPTORS: SHARP
DESCRIPTORS: THROBBING
DESCRIPTORS: THROBBING
DESCRIPTORS: THROBBING;TENDER
DESCRIPTORS: THROBBING
DESCRIPTORS: THROBBING

## 2024-06-28 ASSESSMENT — PAIN DESCRIPTION - ONSET
ONSET: ON-GOING

## 2024-06-28 ASSESSMENT — PAIN DESCRIPTION - PAIN TYPE
TYPE: SURGICAL PAIN
TYPE: ACUTE PAIN
TYPE: ACUTE PAIN;SURGICAL PAIN

## 2024-06-28 ASSESSMENT — PAIN DESCRIPTION - FREQUENCY
FREQUENCY: CONTINUOUS

## 2024-06-28 NOTE — DISCHARGE INSTRUCTIONS
Discharge today after he sees PT/OT & Social Work  Strict ADRIANE left leg  F/U in my office next week.  He has all of his prescriptions at home or have been called into his pharmacy already.

## 2024-06-28 NOTE — PROGRESS NOTES
D/C instructions discussed with pt, new meds and their side effects discussed in detail to which pt verbalized understanding.  PIV removed.

## 2024-06-28 NOTE — PROGRESS NOTES
COmfortable    A.VSS  NVI distally.  Can feel toes + move toes.  Toes brisk cap refill.    Dressing clean and dry    A/P:    Doing welll POD # 1  Plan on D/C to home after has seen PT & OT.  Needs SOcial work for D/C planning.  Home needs etc.   [Patient] : patient [Parents] : parents [Medical Records] : medical records [Initial Evaluation] : an initial evaluation [FreeTextEntry1] : Bilateral Lower Leg Pain

## 2024-06-28 NOTE — CARE COORDINATION
Case Management Assessment            Discharge Note                    Date / Time of Note: 6/28/2024 1:36 PM                  Discharge Note Completed by: KRYSTA ESTRADA    Patient Name: Josias Shafer   YOB: 1965  Diagnosis: Strain of peroneal tendon of left foot, initial encounter [S86.312A]  Strain of left peroneal muscle or tendon [S86.312A]   Date / Time: 6/27/2024  5:56 AM    Current PCP: Jerzy Villalobos MD  Clinic patient: No    Hospitalization in the last 30 days: No       Advance Directives:  Code Status: Full Code  [place] DNR form completed and on chart: Not Indicated    Financial:  Payor: [employer] HEALTH PLAN / Plan: [employer] HEALTH PLAN / Product Type: *No Product type* /      Pharmacy:    [facility] - [place] [phone] - F [phone]  [address] [facility] [address]  Phone: [phone] Fax: [phone]    [address] [phone] -  [phone]  [address] [place] [address]  Phone: [phone] Fax: [phone]    [id number] [id number] [place] [address] [phone] -  [phone]  [address] [place] [id number]  Phone: [phone] Fax: [phone]      Assistance purchasing medications?:    Assistance provided by Case Management: None at this time    Does patient want to participate in local refill/ meds to beds program?:      Meds To Beds General Rules:  1. Can ONLY be done Monday- Friday between 8:30am-5pm  2. Prescription(s) must be in pharmacy by 3pm to be filled same day  3.Copy of patient's insurance/ prescription drug card and patient face sheet must be sent along with the prescription(s)  4. Cost of Rx cannot be added to hospital bill. If financial assistance is needed, please contact unit  or ;  or  CANNOT provide pharmacy voucher for patients

## 2024-06-28 NOTE — PLAN OF CARE
Problem: Chronic Conditions and Co-morbidities  Goal: Patient's chronic conditions and co-morbidity symptoms are monitored and maintained or improved  6/28/2024 0725 by Maureen Buckley, RN  Outcome: Progressing     Problem: Discharge Planning  Goal: Discharge to home or other facility with appropriate resources  6/28/2024 0725 by Maureen Buckley, RN  Outcome: Progressing     Problem: Safety - Adult  Goal: Free from fall injury  6/28/2024 0725 by Maureen Buckley, RN  Outcome: Progressing

## 2024-06-28 NOTE — DISCHARGE INSTR - COC
Continuity of Care Form    Patient Name: Josias Shafer   :  1965  MRN:  [id number]    Admit date:  2024  Discharge date:  2024    Code Status Order: Full Code   Advance Directives:   Advance Care Flowsheet Documentation       Date/Time Healthcare Directive Type of Healthcare Directive Copy in Chart Healthcare Agent Appointed Healthcare Agent's Name Healthcare Agent's Phone Number    24 0633 No, patient does not have an advance directive for healthcare treatment -- -- -- -- --            Admitting Physician:  Damian Davidson MD  PCP: Jerzy Villalobos MD    Discharging Nurse: Tonja  Discharging Hospital Unit/Room#: 5520/5520-01  Discharging Unit Phone Number: [phone]    Emergency Contact:   Extended Emergency Contact Information  Primary Emergency Contact: Jolynn Boles  Address: [address] [place] [address]  Home Phone: [phone]  Relation: Brother/Sister  Secondary Emergency Contact: Darya Shafer  Home Phone: [phone]  Relation: Parent    Past Surgical History:  Past Surgical History:   Procedure Laterality Date    ANKLE SURGERY Left 2024    LEFT REPAIR OF PERONEUS LONGUS TENDON ,  TENDON TRANSFER performed by Damian Davidson MD at [facility] OR    APPENDECTOMY      COLONOSCOPY      KNEE ARTHROSCOPY      OTHER SURGICAL HISTORY  2016    LAPAROSCOPIC SLEEVE GASTRECTOMY           UPPER GASTROINTESTINAL ENDOSCOPY  1/15/16       Immunization History:   Immunization History   Administered Date(s) Administered    COVID-19, PFIZER PURPLE top, DILUTE for use, (age 12 y+), 30mcg/0.3mL 04/10/2021, 2021, 12/10/2021    COVID-19, PFIZER, ( formula), (age 12y+), IM, 30mcg/0.3mL 2024    Hep A-Hep B, TWINRIX, (age 18y+), IM, 1mL 2017, 2017    INFLUENZA, INTRADERMAL, QUADRIVALENT, PRESERVATIVE FREE 2016    Influenza 11/15/2011    Influenza Virus Vaccine 2017,

## 2024-06-28 NOTE — PROGRESS NOTES
Physical Therapy  Facility/Department: [facility] 5T ORTHO/NEURO  Physical Therapy Initial Assessment    Name: Josias Shafer  : 1965  MRN: [id number]  Date of Service: 2024    Discharge Recommendations:  24 hour supervision or assist, Home with Home health PT   PT Equipment Recommendations  Equipment Needed: No  Other: owns RW and knee scooter      Patient Diagnosis(es): The encounter diagnosis was Strain of peroneal tendon of left foot, initial encounter.  Past Medical History:  has a past medical history of Arthritis, Diabetes mellitus type 2 in obese, DM type 2 (diabetes mellitus, type 2) (HCC), HTN, HTN (hypertension), Lymphedema, Morbid obesity with BMI of 50.0-59.9, adult (HCC), Neuropathy, Obesity, HANNAH (obstructive sleep apnea), Stasis dermatitis of both legs, and Tobacco abuse, continuous.  Past Surgical History:  has a past surgical history that includes Appendectomy; Knee arthroscopy; Colonoscopy; Upper gastrointestinal endoscopy (1/15/16); other surgical history (2016); and Ankle surgery (Left, 2024).    Assessment   Assessment: pt is 59 yo male s/p LEFT REPAIR OF PERONEUS LONGUS TENDON , TENDON TRANSFER from home with family and IND at baseline, has been using knee scooter x1 week from last surgery however fell and had to have surgery redone. Pt requiring extensive transfer training to safely perform techniques and maintain NWB status on LLE. pt performing transfers to RW and knee scooter with CGA. pt able to use knee scooter around room with SBA and hop short distances with CGA. pt also able to perform curb step with knee scooter and CGA. Cues required during all mobility. pt safe to return home, recommend initial 24hr A and HHPT. PT to f/u if does not dc today.  Treatment Diagnosis: dec functional mobility  Therapy Prognosis: Good  Decision Making: Medium Complexity  Requires PT Follow-Up: Yes  Activity Tolerance  Activity Tolerance: Patient tolerated evaluation without  scooter)  Assistance: Contact guard assistance  Comment: cues for safe sequencing, able to perform going down forward and backwards (brings front wheels up, drives until back wheels are against step, stepped up with R foot, and brings scooter back wheels up onto step- opposite on way down)     Balance  Sitting - Static: Good  Sitting - Dynamic: Good  Standing - Static: Fair  Standing - Dynamic: Fair (CGA at sink for ADLs)           OutComes Score                                                  AM-PAC - Mobility    AM-PAC Basic Mobility - Inpatient   How much help is needed turning from your back to your side while in a flat bed without using bedrails?: A Little  How much help is needed moving from lying on your back to sitting on the side of a flat bed without using bedrails?: A Little  How much help is needed moving to and from a bed to a chair?: A Little  How much help is needed standing up from a chair using your arms?: A Little  How much help is needed walking in hospital room?: A Little  How much help is needed climbing 3-5 steps with a railing?: A Little  AM-[facility] Inpatient Mobility Raw Score : 18  AM-PAC Inpatient T-Scale Score : 43.63  Mobility Inpatient CMS 0-100% Score: 46.58  Mobility Inpatient CMS G-Code Modifier : CK         Tinneti Score       Goals  Short Term Goals  Time Frame for Short Term Goals: by dc  Short Term Goal 1: pt will perform functional transfers with LRAD and mod I  Short Term Goal 2: pt will perform functional mobility via knee scooter for 50' with mod I  Patient Goals   Patient Goals : to go home       Education  Patient Education  Education Given To: Patient  Education Provided: Role of Therapy;Plan of Care;Precautions;Transfer Training;Fall Prevention Strategies  Education Method: Demonstration;Verbal  Barriers to Learning: None  Education Outcome: Verbalized understanding;Continued education needed      Therapy Time   Individual Concurrent Group Co-treatment   Time In 0815

## 2024-06-28 NOTE — PROGRESS NOTES
Transfer: Minimum assistance  Gait  Gait Training: Yes  Overall Level of Assistance: Stand-by assistance;Contact-guard assistance (rollabout knee scooter and RW for short mobility)  Assistive Device: Walker, rolling;Gait belt (knee scooter)     AROM: Within functional limits  Strength: Within functional limits  ADL  Feeding: Independent  Grooming: Stand by assistance  Grooming Skilled Clinical Factors: stance at sink with rollabout scooter  LE Dressing: Contact guard assistance;Minimal assistance  Toileting: Contact guard assistance  Functional Mobility: Contact guard assistance;Minimal assistance     Activity Tolerance  Activity Tolerance: Patient tolerated evaluation without incident        Vision  Vision: Within Functional Limits  Hearing  Hearing: Within functional limits  Cognition  Overall Cognitive Status: WFL  Orientation  Overall Orientation Status: Within Functional Limits                  Education Given To: Patient  Education Provided: Role of Therapy;Plan of Care;Transfer Training;ADL Adaptive Strategies  Education Method: Demonstration;Verbal  Barriers to Learning: None  Education Outcome: Verbalized understanding;Demonstrated understanding                        G-Code     OutComes Score                                                  AM-PAC - ADL  AM-PAC Daily Activity - Inpatient   How much help is needed for putting on and taking off regular lower body clothing?: A Lot  How much help is needed for bathing (which includes washing, rinsing, drying)?: A Little  How much help is needed for toileting (which includes using toilet, bedpan, or urinal)?: A Little  How much help is needed for putting on and taking off regular upper body clothing?: A Little  How much help is needed for taking care of personal grooming?: A Little  How much help for eating meals?: None  AM-[facility] Inpatient Daily Activity Raw Score: 18  AM-PAC Inpatient ADL T-Scale Score : 38.66  ADL Inpatient CMS 0-100% Score: 46.65  ADL

## 2024-06-28 NOTE — PROGRESS NOTES
This RN assuming care of pt as of 1100.  VSS, afebrile, pt currently reports having 6/10 pain to his LLE that is throbbing in nature.     Splint/ACE CDI.  Pt able to wiggle toes.    POC discussed with pt, questions/concerns addressed at this time.

## 2024-06-28 NOTE — PROGRESS NOTES
Pt A/Ox4, vss on RA. Pt voiding with urinal, No BM this shift. Pt takes pills WWW. Pt tolerating regualr diet and fluids. Pts pain being treated via MAR. Pt NWB to Left foot, has knee scooter from home. Pt expressed that the dose of gabapentin ordered was much lower than the dose he typically takes at home. Secure message was sent to on call hospitalist and order was given for increased dose to start tomorrow. No acute events this shift. Pt resting in bed, bed in lowest position, pt call light within pts reach.

## 2024-06-28 NOTE — PLAN OF CARE
Problem: Chronic Conditions and Co-morbidities  Goal: Patient's chronic conditions and co-morbidity symptoms are monitored and maintained or improved  6/28/2024 1401 by Tonja Salmeron RN  Outcome: Adequate for Discharge  6/28/2024 0725 by Maureen Buckley RN  Outcome: Progressing  6/28/2024 0021 by Annita Lambert RN  Outcome: Progressing     Problem: Discharge Planning  Goal: Discharge to home or other facility with appropriate resources  6/28/2024 1401 by Tonja Salmeron RN  Outcome: Adequate for Discharge  6/28/2024 0725 by Maureen Buckley RN  Outcome: Progressing  6/28/2024 0021 by Annita Lambert RN  Outcome: Progressing     Problem: Safety - Adult  Goal: Free from fall injury  6/28/2024 1401 by Tonja Salmeron RN  Outcome: Adequate for Discharge  6/28/2024 0725 by Maureen Buckley RN  Outcome: Progressing  6/28/2024 0021 by Annita Lambert RN  Outcome: Progressing     Problem: ABCDS Injury Assessment  Goal: Absence of physical injury  6/28/2024 1401 by Tonja Salmeron RN  Outcome: Adequate for Discharge  6/28/2024 0021 by Annita Lambert RN  Outcome: Progressing     Problem: Pain  Goal: Verbalizes/displays adequate comfort level or baseline comfort level  6/28/2024 1401 by Tonja Salmeron RN  Outcome: Adequate for Discharge  6/28/2024 0021 by Annita Lambert RN  Outcome: Progressing

## 2024-06-28 NOTE — CONSULTS
Hospitalist Consultation Note    Patient's PCP: Jerzy Tsai MD     Requesting Physician: Dr. Leung att. providers found    Reason for Consultation: Assistance with perioperative medical management    HISTORY OF PRESENT ILLNESS:    This is a very pleasant 58 y.o. male with a history of hypertension, diabetes mellitus type 2, obstructive sleep apnea on CPAP, tobacco abuse, and morbid obesity who we have been asked to see for assistance with preoperative management.    He was admitted 6/27/2024 for revision of previous surgery on his left foot, with strain of peroneal tendon of left foot with Left peroneus longus tendon tear.  Appears he had fallen prior, had surgery, and appears failed again with re-rupture.    He had Left excision of peroneus longus tendon and flexor hallucis longus tendon transfer under general anesthesia with block. Estimated blood loss was felt to be minimal due to use of tourniquet.    Prior to, and since surgery, he has not had any chest pain, shortness of breath, dizziness or lightheadedness.    Has no hx of CAD or CHF.     He is on Jardiance for his diabetes which was stopped preoperatively.    He had been placed on Xarelto for DVT prophylaxis following his initial surgery, which was stopped for this surgery.    His blood pressures have been noted to be high postoperatively with systolic in the 180s.    He otherwise denies symptoms.    I have reviewed his medical history, and discussed his care with nursing staff.      Interval HPI  No new complaints    Sitting OOB    No chest pain    Medications : Reviewed    Allergies:  Lisinopril and Amoxicillin-pot clavulanate    Social History:   TOBACCO:   reports that he has been smoking cigarettes. He started smoking about 47 years ago. He has a 45.2 pack-year smoking history. He has never used smokeless tobacco.     ETOH:   reports current alcohol use of about 50.0 standard drinks of alcohol per week.    Family History:       Problem  Relation Age of Onset    Diabetes Mother     Cancer Mother     Arthritis Mother         Cancer    Glaucoma Mother     Migraines Mother     Substance Abuse Father     Arthritis Father         Copd    Heart Disease Father     High Blood Pressure Father     Migraines Sister        ROS: Review of Systems - Negative except as in HPI.   All other systems reviewed and are negative.    PHYSICAL EXAM:  /75   Pulse 69   Temp 98.3 °F (36.8 °C) (Oral)   Resp 16   Ht 1.778 m (5' 10\")   Wt 117.2 kg (258 lb 6.4 oz)   SpO2 98%   BMI 37.08 kg/m²     Recent Labs     06/27/24  1023 06/27/24  1721 06/27/24  1945 06/28/24  0741 06/28/24  1100   POCGLU 157* 208* 228* 149* 193*       General:  Well-appearing, NAD, alert, non-toxic  HEENT:  Normocephalic, atraumatic. Pupils equal and round. TMs pearly with good landmarks. Moist mucous membranes. Normal dentition  NECK:  Supple, normal range of motion, no LAD, no meningeal signs, no JVD, nontender  CHEST/LUNGS: CTAB, no crackles, no wheeze, no rhonchi. Symmetric rise  CARDIOVASCULAR: RRR,  no murmur, no rub  ABDOMEN: Soft, non-tender, non-distended. No masses  EXTREMETIES: Left leg wrapped in a postop dressing.   SKIN:  No rash, no cellulitis, no bruising, no petechiae/purpura/vesicles/pustules/abscess  PSYCH:  A+O x 3; normal affect  NEURO:  GCS 15, CN2-12 grossly intact, no focal motor/sensory deficits, no cerebellar deficits    LABS:  No results for input(s): \"WBC\", \"HGB\", \"HCT\", \"PLT\" in the last 72 hours.                                                                 No results for input(s): \"NA\", \"K\", \"CL\", \"CO2\", \"BUN\", \"CREATININE\", \"GLUCOSE\", \"PHOS\" in the last 72 hours.    Invalid input(s): \"CA\"    No results for input(s): \"AST\", \"ALT\", \"BILITOT\", \"ALKPHOS\" in the last 72 hours.    Invalid input(s): \"ALB\"  No results for input(s): \"TROPONINI\" in the last 72 hours.  No results for input(s): \"BNP\" in the last 72 hours.  No results for input(s): \"CHOL\", \"HDL\" in the  discharge

## 2024-06-29 NOTE — DISCHARGE SUMMARY
The patient was admitted to the hospital and underwent tendon repair/tendon transfer due to a work related injury.    He tolerated the procedure well.  He was admitted post-operatively for pain control, antibiotics and medical management.  He was seen and evaluted by physical therapy and occupational therapy.    He was discharged to home the day after surgery.  He is to be non-weight bearing for 6 weeks.  He will follow up in our office.  He has a prescription for xaralto for post-op DVT prophylaxis which he will take as instructed at home.  Follow up in my office in 7 - 10 days.

## 2024-07-03 ENCOUNTER — TELEPHONE (OUTPATIENT)
Dept: FAMILY MEDICINE CLINIC | Age: 59
End: 2024-07-03

## 2024-07-03 DIAGNOSIS — L97.929 VENOUS STASIS ULCERS OF BOTH LOWER EXTREMITIES (HCC): Primary | ICD-10-CM

## 2024-07-03 DIAGNOSIS — I83.029 VENOUS STASIS ULCERS OF BOTH LOWER EXTREMITIES (HCC): Primary | ICD-10-CM

## 2024-07-03 DIAGNOSIS — I83.019 VENOUS STASIS ULCERS OF BOTH LOWER EXTREMITIES (HCC): Primary | ICD-10-CM

## 2024-07-03 DIAGNOSIS — L97.919 VENOUS STASIS ULCERS OF BOTH LOWER EXTREMITIES (HCC): Primary | ICD-10-CM

## 2024-07-03 NOTE — TELEPHONE ENCOUNTER
XARELTO 10 MG TABS tablet     Pt wanted to know if we have any samples or coupons  for above medication since his ins doesn't cover it that much and it is still really expensive

## 2024-07-05 DIAGNOSIS — R39.198 DECREASED URINE STREAM: ICD-10-CM

## 2024-07-05 RX ORDER — TAMSULOSIN HYDROCHLORIDE 0.4 MG/1
CAPSULE ORAL
Qty: 90 CAPSULE | Refills: 3 | Status: SHIPPED | OUTPATIENT
Start: 2024-07-05

## 2024-07-05 NOTE — TELEPHONE ENCOUNTER
Medication:   Requested Prescriptions     Pending Prescriptions Disp Refills    tamsulosin (FLOMAX) 0.4 MG capsule [Pharmacy Med Name: TAMSULOSIN HCL CAPS 0.4MG] 90 capsule 3     Sig: TAKE 1 CAPSULE DAILY        Last Filled:  06/19/2023    Patient Phone Number: [phone] (home)     Last appt: 6/25/2024   Next appt: Visit date not found    Last OARRS:        No data to display

## 2024-07-11 RX ORDER — RIVAROXABAN 10 MG/1
10 TABLET, FILM COATED ORAL
Qty: 35 TABLET | Refills: 0 | Status: CANCELLED | COMMUNITY
Start: 2024-07-11

## 2024-07-16 NOTE — FLOWSHEET NOTE
End time:  1252pm Resistance / level Sets/sec Reps Notes   HEP performed and gone over see below    Eval,   Stepper Level 4 6' 10 all    swiss ball LB A/P, lat, CW, CCW  1     Head on ball or pillow cervical NPV      IB  HR/TR  1/30\"  2 2  10    Stairs hams    hip flexor stretch L/R  30\"  60\" 2 L/R  1 L/R    tband mid row, LPD, high row Blue 2 10                  Therapeutic Activities (80727)  Start time:  End time:                                   Neuromuscular Re-ed (55692)  Start time:  End time:                                                 Manual Intervention (85027)  Start time: 1253  End time: 1305       Prone STM B paraspinals cervical, lumbar mm, esvin UT's, gentle manual quad stretches (R side tight from old knee injury), supine gentle manual stretches B hams, pirif, glut  12 min                                            Modalities: 2/15 15 min start time 10:30-10:45am supine with grey bolster under LE's CP in pillow case to neck, CP with towel over to LB, premod estim to neck and low back, covered with warm blanket. Pt has been using CP and heating pad prn at home. Pt. Education:  -patient educated on diagnosis, prognosis and expectations for rehab  -all patient questions were answered    HEP instruction:  HEP instruction: Written HEP instructions provided and reviewed  Access Code: [email or web address]   URL: [email or web address]   Date: 02/15/2021   Prepared by:  Chris Hidalgo     Exercises   · Supine Shoulder Flexion AAROM with Dowel - 5 reps - 2 sets - 1x daily - 7x weekly   · Seated Cervical Retraction - 5 reps - 2 sets - 1x daily - 7x weekly   · Seated Cervical Rotation AROM - 5 reps - 2 sets - 1x daily - 7x weekly   · Seated Cervical Retraction and Extension - 5 reps - 2 sets - 1x daily - 7x weekly   · Seated Cervical Sidebending AROM - 5 reps - 2 sets - 1x daily - 7x weekly   · Seated Scapular Retraction - 10 reps - 2 sets - 1x daily - 7x weekly Depth In Mm: 0.25 · Supine Posterior Pelvic Tilt - 10 reps - 2 sets - 1x daily - 7x weekly   · Supine Lower Trunk Rotation - 10 reps - 2 sets - 1x daily - 7x weekly   · Supine Single Knee to Chest Stretch - 4 reps - 2 sets - 15 hold - 1x daily - 7x weekly   · Prone Press Up on Elbows - 4 reps - 2 sets - 1x daily - 7x weekly   · Prone Knee Flexion - 5 reps - 2 sets - 1x daily - 7x weekly     Therapeutic Exercise and NMR EXR  [x] (15828) Provided verbal/tactile cueing for activities related to strengthening, flexibility, endurance, ROM for improvements in  [x] LE / Lumbar: LE, proximal hip, and core control with self care, mobility, lifting, ambulation. [x] UE / Cervical: cervical, postural, scapular, scapulothoracic and UE control with self care, reaching, carrying, lifting, house/yardwork, driving, computer work.  [] (11592) Provided verbal/tactile cueing for activities related to improving balance, coordination, kinesthetic sense, posture, motor skill, proprioception to assist with   [] LE / lumbar: LE, proximal hip, and core control in self care, mobility, lifting, ambulation and eccentric single leg control. [] UE / cervical: cervical, scapular, scapulothoracic and UE control with self care, reaching, carrying, lifting, house/yardwork, driving, computer work.   [] (95570) Therapist is in constant attendance of 2 or more patients providing skilled therapy interventions, but not providing any significant amount of measurable one-on-one time to either patient, for improvements in  [] LE / lumbar: LE, proximal hip, and core control in self care, mobility, lifting, ambulation and eccentric single leg control. [] UE / cervical: cervical, scapular, scapulothoracic and UE control with self care, reaching, carrying, lifting, house/yardwork, driving, computer work.      NMR and Therapeutic Activities: Post-Care Instructions: After the procedure, take precautions agains sun exposure. Do not apply sunscreen for 12 hours after the procedure. Do not apply make-up for 12 hours after the procedure. Avoid alcohol based toners for 10-14 days. After 2-3 days patients can return to their regular skin regimen. [] (57207 or 20055) Provided verbal/tactile cueing for activities related to improving balance, coordination, kinesthetic sense, posture, motor skill, proprioception and motor activation to allow for proper function of   [] LE: / Lumbar core, proximal hip and LE with self care and ADLs  [] UE / Cervical: cervical, postural, scapular, scapulothoracic and UE control with self care, carrying, lifting, driving, computer work.   [] (24585) Gait Re-education- Provided training and instruction to the patient for proper LE, core and proximal hip recruitment and positioning and eccentric body weight control with ambulation re-education including up and down stairs     Home Management Training / Self Care:  [] (79084) Home Management Training / Self-care: ADLs and compensatory training, meal preparation, safety procedures and instruction in use of adaptive equipment, including bathing, grooming, dressing, personal hygiene, basic household cleaning and chores.      Home Exercise Program:    [x] (73298) Reviewed/Progressed HEP activities related to strengthening, flexibility, endurance, ROM of   [x] LE / Lumbar: core, proximal hip and LE for functional self-care, mobility, lifting and ambulation/stair navigation   [x] UE / Cervical: cervical, postural, scapular, scapulothoracic and UE control with self care, reaching, carrying, lifting, house/yardwork, driving, computer work  [] (75288)Reviewed/Progressed HEP activities related to improving balance, coordination, kinesthetic sense, posture, motor skill, proprioception of   [] LE: core, proximal hip and LE for self care, mobility, lifting, and ambulation/stair navigation    [] UE / Cervical: cervical, postural,  scapular, scapulothoracic and UE control with self care, reaching, carrying, lifting, house/yardwork, driving, computer work    Manual Treatments:  PROM / STM / Oscillations-Mobs:  G-I, II, III, IV (PA's, Inf., Post.) Depth In Mm: 1 [x] (30470) Provided manual therapy to mobilize LE, proximal hip and/or LS spine soft tissue/joints for the purpose of modulating pain, promoting relaxation,  increasing ROM, reducing/eliminating soft tissue swelling/inflammation/restriction, improving soft tissue extensibility and allowing for proper ROM for normal function with   [x] LE / lumbar: self care, mobility, lifting and ambulation. [x] UE / Cervical: self care, reaching, carrying, lifting, house/yardwork, driving, computer work. Modalities:  [] (01904) Vasopneumatic compression: Utilized vasopneumatic compression to decrease edema / swelling for the purpose of improving mobility and quad tone / recruitment which will allow for increased overall function including but not limited to self-care, transfers, ambulation, and ascending / descending stairs. Charges:  Timed Code Treatment Minutes: 48   Total Treatment Minutes: 48     [] EVAL - LOW (97193)   [] EVAL - MOD (69130)  [] EVAL - HIGH (95419)  [] RE-EVAL (65933)  [x] NC(28689) x 2     [] Ionto  [] NMR (10838) x       [] Vaso  [x] Manual (33087) x 1      [] Ultrasound  [] TA x        [] Mech Traction (16222)  [] Aquatic Therapy x     [] ES (un) (34492):1   [] Home Management Training x  [] ES(attended) (99173)   [] Dry Needling 1-2 muscles (85778):  [] Dry Needling 3+ muscles (511979  [] Group:      [] Other:     GOALS:   Patient stated goal: No pain neck and low back for RTW  []? Progressing: []? Met: []? Not Met: []? Adjusted     Therapist goals for Patient:   Short Term Goals: To be achieved in: 2 weeks  1. Independent in HEP and progression per patient tolerance, in order to prevent re-injury. []? Progressing: []? Met: []? Not Met: []? Adjusted  2. Patient will have a decrease in pain to facilitate improvement in movement, function, and ADLs as indicated by Functional Deficits. []? Progressing: []? Met: []? Not Met: []? Adjusted     Long Term Goals:  To be achieved in: 3 weeks 1. Disability index score of 10% or less for the NDI and/or LEONA to assist with reaching prior level of function. []? Progressing: []? Met: []? Not Met: []? Adjusted  2. Patient will demonstrate increased AROM to [facility] of cervical/thoracic spine and good LS mobility, good hip ROM to allow for proper joint functioning as indicated by patients Functional Deficits. []? Progressing: []? Met: []? Not Met: []? Adjusted  3. Patient will demonstrate an increase in postural awareness and control and activation of deep cervical stabilizers to allow for proper functional mobility as indicated by patients Functional Deficits. []? Progressing: []? Met: []? Not Met: []? Adjusted  4. Patient will demonstrate an increase in Strength to good proximal hip and core activation to allow for proper functional mobility as indicated by patients Functional Deficits. []? Progressing: []? Met: []? Not Met: []? Adjusted  5. Patient will return to functional activities including RTW, lifting  without increased symptoms or restriction. []? Progressing: []? Met: []? Not Met: []? Adjusted  Overall Progression Towards Functional goals/ Treatment Progress Update:  [] Patient is progressing as expected towards functional goals listed. [] Progression is slowed due to complexities/Impairments listed. [] Progression has been slowed due to co-morbidities.   [x] Plan just implemented, too soon to assess goals progression <30days   [] Goals require adjustment due to lack of progress  [] Patient is not progressing as expected and requires additional follow up with physician  [] Other    Persisting Functional Limitations/Impairments:  []Sleeping [x]Sitting               [x]Standing []Transfers        [x]Walking [x]Kneeling               [x]Stairs [x]Squatting / bending   [x]ADLs [x]Reaching  [x]Lifting  []Housework  [x]Driving [x]Job related tasks  []Sports/Recreation []Other: Location #1: forehead, under eyes ASSESSMENT:  Patient will need reinforcement to perform his HEP. Patient had mild levels of fatigue while performing resisted band exercises for spine this date. Patient had L greater than R myofascial bands in L levator/UT as well as Lumbar ps/lower QL. Patient will likely benefit from skilled PT to address his impairments and functional limitations,    Treatment/Activity Tolerance:  [x] Patient able to complete tx [] Patient limited by fatigue  [x] Patient limited by pain  [] Patient limited by other medical complications  [] Other:     Prognosis: [x] Good [] Fair  [] Poor    Patient Requires Follow-up: [x] Yes  [] No    Plan for next treatment session:see above, manual prn, mod prn, flexib and strength ex  PLAN: See eval. PT 3x / week for 3 weeks. [x] Continue per plan of care [] Alter current plan (see comments)  [] Plan of care initiated [] Hold pending MD visit [] Discharge    Electronically signed by: K[address], MSPT #[id number]  Note: If patient does not return for scheduled/ recommended follow up visits, this note will serve as a discharge from care along with most recent update on progress. Depth In Mm: 2 Consent: Written consent obtained, risks reviewed including but not limited to pain, scarring, infection and incomplete improvement.  Patient understands the procedure is cosmetic in nature and will require out of pocket payment. Treatment Number (Optional): 0 Detail Level: Zone Location #2: cheeks and chin

## 2024-08-13 RX ORDER — METFORMIN HYDROCHLORIDE 500 MG/1
500 TABLET, EXTENDED RELEASE ORAL
Qty: 90 TABLET | Refills: 3 | Status: SHIPPED | OUTPATIENT
Start: 2024-08-13

## 2024-09-10 RX ORDER — LOSARTAN POTASSIUM 100 MG/1
100 TABLET ORAL DAILY
Qty: 90 TABLET | Refills: 3 | Status: SHIPPED | OUTPATIENT
Start: 2024-09-10

## 2024-09-23 NOTE — LETTER
[address]  Phone: [phone]  Fax: [phone]     Ky Ireland MD           January 13, 2021      Patient: Peters Bring   YOB: 1965   Date of Visit: 1/13/2021         To Whom it May Concern:     It is in my medical opinion that patient be excuse from work from 1/11/2021 through 1/18/2020 due to medical condition and FMLA reasons.   He is to return to work on 01/18/2021.     Thank you for your cooperation.     If you have any questions or concerns, please don't hesitate to call.     Sincerely,            Ky Ireland MD [facility]

## 2024-10-02 ENCOUNTER — TELEPHONE (OUTPATIENT)
Dept: FAMILY MEDICINE CLINIC | Age: 59
End: 2024-10-02

## 2024-10-02 NOTE — TELEPHONE ENCOUNTER
Pt wanted to know if he had his covid vaccine done before in our system? Please call pt back if he is overdue

## 2024-10-10 ENCOUNTER — TELEPHONE (OUTPATIENT)
Dept: CASE MANAGEMENT | Age: 59
End: 2024-10-10

## 2024-10-10 DIAGNOSIS — F17.209 TOBACCO USE DISORDER, CONTINUOUS: Primary | ICD-10-CM

## 2024-10-10 NOTE — TELEPHONE ENCOUNTER
Letitia Villalobos, Jerzy SOSA MD,    This is a friendly reminder that your patient is due for their annual lung screen on 10/25/24.  For your convenience, I have pended the order for the scan.  If you do not agree with the need for the test, please cancel the order and let me know.      Patient will be mailed reminder letter to schedule.      Thank you,     Isa Dominguez  Lung Navigator  [facility]  [phone]    No future appointments.    Last PCP Appt: 6/2024     Lung Screen Criteria  Age 50-80  Current smoker or quit within the last 15 years  Has => 20 pack year history.

## 2024-10-18 ENCOUNTER — TELEPHONE (OUTPATIENT)
Dept: FAMILY MEDICINE CLINIC | Age: 59
End: 2024-10-18

## 2024-10-29 DIAGNOSIS — G62.9 NEUROPATHY: ICD-10-CM

## 2024-10-29 NOTE — TELEPHONE ENCOUNTER
Patient called and he is out of his     gabapentin (NEURONTIN) 300 MG capsule [[id number]]     He said he has been taking it 3 times a day and not the 2 times a day so now he is short. They mailorder pharm said he isn't due till nov 11    He will need a short supply sent to   [facility] [email or web address]     And the remaining refills sent to [facility] mail order  [facility]. With the increase dosage as well

## 2024-10-30 RX ORDER — GABAPENTIN 300 MG/1
CAPSULE ORAL
Qty: 270 CAPSULE | Refills: 0 | Status: SHIPPED | OUTPATIENT
Start: 2024-10-30 | End: 2025-06-04

## 2024-11-01 ENCOUNTER — TELEPHONE (OUTPATIENT)
Dept: ADMINISTRATIVE | Age: 59
End: 2024-11-01

## 2024-11-01 NOTE — TELEPHONE ENCOUNTER
Submitted PA for Gabapentin 300MG capsules   Via CMM Key: [id number] STATUS: PENDING.    Follow up done daily; if no decision with in three days we will refax.  If another three days goes by with no decision will call the insurance for status.

## 2024-11-04 NOTE — TELEPHONE ENCOUNTER
The medication was DENIED; DENIAL letter is uploaded to MEDIA.    Generic Denial:  Other; please see Denial Letter.         Note :   Coverage for additional drug quantities is provided for patients who require a dose of more than 900 mg per day. Coverage will continue with the plan's limitation for the requested medication. Additional coverage cannot be authorized at this time.       If you want an APPEAL; please note in this encounter what new information you would like to APPEAL with.  Once complete route back to PA POOL.    If this requires a response please respond to the pool ( P MHCX PSC MEDICATION PRE-AUTH).      Thank you please advise patient.

## 2024-11-12 DIAGNOSIS — G62.9 NEUROPATHY: ICD-10-CM

## 2024-11-12 RX ORDER — EMPAGLIFLOZIN 25 MG/1
25 TABLET, FILM COATED ORAL DAILY
Qty: 90 TABLET | Refills: 3 | Status: SHIPPED | OUTPATIENT
Start: 2024-11-12

## 2024-11-12 RX ORDER — GABAPENTIN 300 MG/1
CAPSULE ORAL
Qty: 270 CAPSULE | Refills: 0 | Status: SHIPPED | OUTPATIENT
Start: 2024-11-12 | End: 2024-12-12

## 2024-11-12 NOTE — TELEPHONE ENCOUNTER
Lov 6/25/24  Lrf 90 3 10/12/23   270 0 10/30/24 Medication:   Requested Prescriptions     Pending Prescriptions Disp Refills    JARDIANCE 25 MG tablet [Pharmacy Med Name: JARDIANCE TABS 25MG] 90 tablet 3     Sig: TAKE 1 TABLET DAILY    gabapentin (NEURONTIN) 300 MG capsule [Pharmacy Med Name: GABAPENTIN CAPS 300MG] 270 capsule 7     Sig: TAKE 3 CAPSULES TWICE A DAY       Last Filled:      Patient Phone Number: [phone] (home)     Last appt: 6/25/2024   Next appt: Visit date not found    Last Labs DM:   Lab Results   Component Value Date/Time    LABA1C 6.4 05/02/2024 01:51 PM

## 2024-11-26 RX ORDER — ATORVASTATIN CALCIUM 20 MG/1
20 TABLET, FILM COATED ORAL DAILY
Qty: 90 TABLET | Refills: 3 | Status: SHIPPED | OUTPATIENT
Start: 2024-11-26

## 2024-11-26 NOTE — TELEPHONE ENCOUNTER
Medication:   Requested Prescriptions     Pending Prescriptions Disp Refills    atorvastatin (LIPITOR) 20 MG tablet [Pharmacy Med Name: ATORVASTATIN TABS 20MG] 90 tablet 3     Sig: TAKE 1 TABLET DAILY       Last Filled:      Patient Phone Number: [phone] (home)     Last appt: 6/25/2024   Next appt: Visit date not found    Last Lipid:   Lab Results   Component Value Date/Time    CHOL 169 12/29/2022 09:54 AM    TRIG 115 12/29/2022 09:54 AM    HDL 49 04/03/2024 10:57 AM    HDL 43 08/18/2011 09:27 AM                How Severe Is Your Rash?: mild Is This A New Presentation, Or A Follow-Up?: Rash

## 2024-12-09 ENCOUNTER — CLINICAL DOCUMENTATION (OUTPATIENT)
Dept: CASE MANAGEMENT | Age: 59
End: 2024-12-09

## 2024-12-09 NOTE — PROGRESS NOTES
Patient meets lung screening criteria. Patient due for annual CT Lung Screening. Second reminder letter mailed. If ordered, Patient may call [phone] to schedule.     Lung Screen Criteria  Age 50-80  Current smoker or quit within the last 15 years  Has => 20 pack year history.    No future appointments.    Active Lung Screen order on chart.

## 2025-01-03 ENCOUNTER — TELEPHONE (OUTPATIENT)
Dept: FAMILY MEDICINE CLINIC | Age: 60
End: 2025-01-03

## 2025-01-03 NOTE — TELEPHONE ENCOUNTER
Medication and Quantity requested:        gabapentin (NEURONTIN) 300 MG capsule [[id number]]  ENDED     Patient is out of medication     Last Visit    06/25/2024    Pharmacy and phone number updated in EPIC:  [facility] [address] [facility] [address]       Patient is scheduled  01/16/2025 at 2:45 pm

## 2025-01-06 DIAGNOSIS — G62.9 NEUROPATHY: ICD-10-CM

## 2025-01-06 RX ORDER — GABAPENTIN 300 MG/1
CAPSULE ORAL
Qty: 270 CAPSULE | Refills: 5 | Status: SHIPPED | OUTPATIENT
Start: 2025-01-06 | End: 2025-02-05

## 2025-01-07 ENCOUNTER — TELEPHONE (OUTPATIENT)
Dept: FAMILY MEDICINE CLINIC | Age: 60
End: 2025-01-07

## 2025-01-08 ENCOUNTER — CLINICAL DOCUMENTATION (OUTPATIENT)
Dept: CASE MANAGEMENT | Age: 60
End: 2025-01-08

## 2025-01-08 NOTE — PROGRESS NOTES
Patient meets lung screening criteria. Patient due for annual CT Lung Screening. Final reminder letter mailed. If ordered, Patient may call [phone] to schedule.     Lung Screen Criteria  Age 50-80  Current smoker or quit within the last 15 years  Has => 20 pack year history.    Future Appointments   Date Time Provider Department Center   1/16/2025  2:45 PM Jerzy Villalobos MD [facility]       Active Lung Screen order on chart.

## 2025-01-08 NOTE — TELEPHONE ENCOUNTER
Submitted PA for gabapentin 300mg  Via CM Key: [id number] STATUS: PENDING.    Follow up done daily; if no decision with in three days we will refax.  If another three days goes by with no decision will call the insurance for status.

## 2025-01-13 NOTE — TELEPHONE ENCOUNTER
The medication is APPROVED.    CaseId:[id number];Status:Approved;Review Type:Qty;Coverage Start Date:01/01/2025;Coverage End Date:01/10/2026;  Authorization Expiration Date: 1/9/2026    If this requires a response please respond to the pool ( P MHCX PSC MEDICATION PRE-AUTH).      Thank you please advise patient.

## 2025-01-16 ENCOUNTER — OFFICE VISIT (OUTPATIENT)
Dept: FAMILY MEDICINE CLINIC | Age: 60
End: 2025-01-16

## 2025-01-16 VITALS
SYSTOLIC BLOOD PRESSURE: 130 MMHG | OXYGEN SATURATION: 97 % | WEIGHT: 271.6 LBS | BODY MASS INDEX: 38.97 KG/M2 | HEART RATE: 89 BPM | DIASTOLIC BLOOD PRESSURE: 70 MMHG

## 2025-01-16 DIAGNOSIS — Z23 FLU VACCINE NEED: ICD-10-CM

## 2025-01-16 DIAGNOSIS — L97.919 VENOUS STASIS ULCERS OF BOTH LOWER EXTREMITIES (HCC): ICD-10-CM

## 2025-01-16 DIAGNOSIS — E66.9 TYPE 2 DIABETES MELLITUS WITH OBESITY (HCC): Primary | ICD-10-CM

## 2025-01-16 DIAGNOSIS — I83.029 VENOUS STASIS ULCERS OF BOTH LOWER EXTREMITIES (HCC): ICD-10-CM

## 2025-01-16 DIAGNOSIS — E11.9 TYPE 2 DIABETES MELLITUS WITHOUT COMPLICATION, WITHOUT LONG-TERM CURRENT USE OF INSULIN (HCC): ICD-10-CM

## 2025-01-16 DIAGNOSIS — F17.209 TOBACCO USE DISORDER, CONTINUOUS: ICD-10-CM

## 2025-01-16 DIAGNOSIS — L97.929 VENOUS STASIS ULCERS OF BOTH LOWER EXTREMITIES (HCC): ICD-10-CM

## 2025-01-16 DIAGNOSIS — Z87.891 PERSONAL HISTORY OF TOBACCO USE: ICD-10-CM

## 2025-01-16 DIAGNOSIS — E11.69 TYPE 2 DIABETES MELLITUS WITH OBESITY (HCC): Primary | ICD-10-CM

## 2025-01-16 DIAGNOSIS — I10 ESSENTIAL HYPERTENSION: ICD-10-CM

## 2025-01-16 DIAGNOSIS — G47.33 OSA (OBSTRUCTIVE SLEEP APNEA): ICD-10-CM

## 2025-01-16 DIAGNOSIS — I89.0 LYMPHEDEMA: ICD-10-CM

## 2025-01-16 DIAGNOSIS — I83.019 VENOUS STASIS ULCERS OF BOTH LOWER EXTREMITIES (HCC): ICD-10-CM

## 2025-01-16 DIAGNOSIS — G62.9 NEUROPATHY: ICD-10-CM

## 2025-01-16 DIAGNOSIS — Z12.11 COLON CANCER SCREENING: ICD-10-CM

## 2025-01-16 DIAGNOSIS — Z98.84 S/P LAPAROSCOPIC SLEEVE GASTRECTOMY: ICD-10-CM

## 2025-01-16 LAB — HBA1C MFR BLD: 7.2 %

## 2025-01-16 RX ORDER — MELOXICAM 15 MG/1
15 TABLET ORAL DAILY
COMMUNITY

## 2025-01-16 RX ORDER — GABAPENTIN 600 MG/1
900 TABLET ORAL 2 TIMES DAILY
Qty: 90 TABLET | Refills: 0 | Status: SHIPPED | OUTPATIENT
Start: 2025-01-16 | End: 2025-02-15

## 2025-01-16 RX ORDER — GABAPENTIN 600 MG/1
900 TABLET ORAL 2 TIMES DAILY
Qty: 270 TABLET | Refills: 3 | Status: SHIPPED | OUTPATIENT
Start: 2025-01-16 | End: 2026-01-11

## 2025-01-16 RX ORDER — GABAPENTIN 300 MG/1
CAPSULE ORAL
Qty: 270 CAPSULE | Refills: 5 | Status: SHIPPED | OUTPATIENT
Start: 2025-01-16 | End: 2025-01-16 | Stop reason: CLARIF

## 2025-01-16 SDOH — ECONOMIC STABILITY: FOOD INSECURITY: WITHIN THE PAST 12 MONTHS, THE FOOD YOU BOUGHT JUST DIDN'T LAST AND YOU DIDN'T HAVE MONEY TO GET MORE.: NEVER TRUE

## 2025-01-16 SDOH — ECONOMIC STABILITY: FOOD INSECURITY: WITHIN THE PAST 12 MONTHS, YOU WORRIED THAT YOUR FOOD WOULD RUN OUT BEFORE YOU GOT MONEY TO BUY MORE.: NEVER TRUE

## 2025-01-16 ASSESSMENT — PATIENT HEALTH QUESTIONNAIRE - PHQ9
SUM OF ALL RESPONSES TO PHQ QUESTIONS 1-9: 0
2. FEELING DOWN, DEPRESSED OR HOPELESS: NOT AT ALL
1. LITTLE INTEREST OR PLEASURE IN DOING THINGS: NOT AT ALL
SUM OF ALL RESPONSES TO PHQ QUESTIONS 1-9: 0
SUM OF ALL RESPONSES TO PHQ9 QUESTIONS 1 & 2: 0
SUM OF ALL RESPONSES TO PHQ QUESTIONS 1-9: 0
SUM OF ALL RESPONSES TO PHQ QUESTIONS 1-9: 0

## 2025-01-16 NOTE — PROGRESS NOTES
Josias Shafer is a 59 y.o. male.    HPI:here for complex medical visit  Still works a [employer]  Used to weigh almost 400 pounds, had gastric sleeve and got down to 220, now has settled around 270  Lymphedema and swelling in stasis ulcerations have all improved vastly now uses compression stockings and there are no active open sores  Has neuropathy which is well-controlled with gabapentin but has had difficulty getting his prescription filled.  He takes 900 mg twice daily  Has stopped his CPAP machine 2 years ago feels like he does not have daytime somnolence any longer and really does not need more than for 5 hours of sleep every night  Meds, vitamins and allergies reviewed with pt    ROS: No TIA's or unusual headaches, no dysphagia.  No prolonged cough. No dyspnea or chest pain on exertion.  No abdominal pain, change in bowel habits, black or bloody stools.  No urinary tract symptoms.  No new or unusual musculoskeletal symptoms.       Prior to Visit Medications    Medication Sig Taking? Authorizing Provider   gabapentin (NEURONTIN) 300 MG capsule TAKE 3 CAPSULES TWICE A DAY Yes Jerzy Villalobos MD   atorvastatin (LIPITOR) 20 MG tablet TAKE 1 TABLET DAILY Yes Jerzy Villalobos MD   JARDIANCE 25 MG tablet TAKE 1 TABLET DAILY Yes Jerzy Villalobos MD   losartan (COZAAR) 100 MG tablet TAKE 1 TABLET DAILY Yes Jerzy Villalobos MD   metFORMIN (GLUCOPHAGE-XR) 500 MG extended release tablet TAKE 1 TABLET DAILY WITH BREAKFAST Yes Jerzy Villalobos MD   tamsulosin (FLOMAX) 0.4 MG capsule TAKE 1 CAPSULE DAILY Yes Jerzy Villalobos MD   XARELTO 10 MG TABS tablet Take 1 tablet by mouth daily Yes ProviderMario MD   HYDROcodone-acetaminophen (NORCO) 5-325 MG per tablet Take 1-2 tablets by mouth every 4-6 hours as needed for Pain. Yes Mario Field MD   metoprolol succinate (TOPROL XL) 25 MG extended release tablet Take 1 tablet by mouth daily Yes Jerzy Villalobos MD   ONETOUCH ULTRA strip

## 2025-01-16 NOTE — PROGRESS NOTES
Visual inspection:  Deformity/amputation: absent  Skin lesions/pre-ulcerative calluses: absent  Edema: right- negative, left- negative    Sensory exam:  Monofilament sensation: normal  (minimum of 5 random plantar locations tested, avoiding callused areas - > 1 area with absence of sensation is + for neuropathy)    Plus at least one of the following:  Pulses: normal,   Pinprick: Intact  Proprioception: Intact  Vibration (128 Hz): Intact

## 2025-01-17 LAB
CREAT UR-MCNC: 83.9 MG/DL (ref 39–259)
MICROALBUMIN UR DL<=1MG/L-MCNC: <1.2 MG/DL
MICROALBUMIN/CREAT UR: NORMAL MG/G (ref 0–30)

## 2025-02-10 DIAGNOSIS — E11.69 TYPE 2 DIABETES MELLITUS WITH OBESITY (HCC): ICD-10-CM

## 2025-02-10 DIAGNOSIS — E66.9 TYPE 2 DIABETES MELLITUS WITH OBESITY (HCC): ICD-10-CM

## 2025-02-10 RX ORDER — BLOOD SUGAR DIAGNOSTIC
STRIP MISCELLANEOUS
Qty: 100 STRIP | Refills: 3 | Status: SHIPPED | OUTPATIENT
Start: 2025-02-10

## 2025-02-10 NOTE — TELEPHONE ENCOUNTER
Medication:   Requested Prescriptions     Pending Prescriptions Disp Refills    ONETOUCH ULTRA strip [Pharmacy Med Name: ONE TOUCH ULTRA STRIPS 100'S] 100 strip 3     Sig: USE 1 STRIP  DAILY AS NEEDED        Last Filled:  NA    Patient Phone Number: [phone] (home)     Last appt: 1/16/2025   Next appt: 4/16/2025    Last OARRS:        No data to display

## 2025-03-13 NOTE — TELEPHONE ENCOUNTER
Cont low carb diet   Dr Yocasta Fleming completed [place] paperwork.   (copied and scanned into media)    Pt notified paperwork ready for .  (in brown folder)

## 2025-04-16 ENCOUNTER — OFFICE VISIT (OUTPATIENT)
Dept: FAMILY MEDICINE CLINIC | Age: 60
End: 2025-04-16
Payer: COMMERCIAL

## 2025-04-16 ENCOUNTER — OFFICE VISIT (OUTPATIENT)
Dept: ORTHOPEDIC SURGERY | Age: 60
End: 2025-04-16

## 2025-04-16 VITALS
OXYGEN SATURATION: 98 % | BODY MASS INDEX: 38.63 KG/M2 | WEIGHT: 269.8 LBS | SYSTOLIC BLOOD PRESSURE: 118 MMHG | HEART RATE: 64 BPM | HEIGHT: 70 IN | DIASTOLIC BLOOD PRESSURE: 72 MMHG

## 2025-04-16 VITALS — WEIGHT: 269 LBS | BODY MASS INDEX: 38.51 KG/M2 | HEIGHT: 70 IN

## 2025-04-16 DIAGNOSIS — M25.511 CHRONIC RIGHT SHOULDER PAIN: ICD-10-CM

## 2025-04-16 DIAGNOSIS — I10 ESSENTIAL HYPERTENSION: ICD-10-CM

## 2025-04-16 DIAGNOSIS — G47.33 OSA (OBSTRUCTIVE SLEEP APNEA): ICD-10-CM

## 2025-04-16 DIAGNOSIS — G89.29 CHRONIC RIGHT SHOULDER PAIN: ICD-10-CM

## 2025-04-16 DIAGNOSIS — E11.69 TYPE 2 DIABETES MELLITUS WITH OBESITY (HCC): ICD-10-CM

## 2025-04-16 DIAGNOSIS — E66.9 TYPE 2 DIABETES MELLITUS WITH OBESITY (HCC): ICD-10-CM

## 2025-04-16 DIAGNOSIS — I89.0 LYMPHEDEMA: ICD-10-CM

## 2025-04-16 DIAGNOSIS — E66.01 MORBID OBESITY WITH BMI OF 40.0-44.9, ADULT: Primary | ICD-10-CM

## 2025-04-16 DIAGNOSIS — M25.511 RIGHT SHOULDER PAIN, UNSPECIFIED CHRONICITY: ICD-10-CM

## 2025-04-16 DIAGNOSIS — M19.011 PRIMARY OSTEOARTHRITIS OF RIGHT SHOULDER: Primary | ICD-10-CM

## 2025-04-16 DIAGNOSIS — F17.209 TOBACCO USE DISORDER, CONTINUOUS: ICD-10-CM

## 2025-04-16 LAB — HBA1C MFR BLD: 6.7 %

## 2025-04-16 PROCEDURE — G8417 CALC BMI ABV UP PARAM F/U: HCPCS | Performed by: FAMILY MEDICINE

## 2025-04-16 PROCEDURE — G8427 DOCREV CUR MEDS BY ELIG CLIN: HCPCS | Performed by: FAMILY MEDICINE

## 2025-04-16 PROCEDURE — 3017F COLORECTAL CA SCREEN DOC REV: CPT | Performed by: FAMILY MEDICINE

## 2025-04-16 PROCEDURE — 99214 OFFICE O/P EST MOD 30 MIN: CPT | Performed by: FAMILY MEDICINE

## 2025-04-16 PROCEDURE — 2022F DILAT RTA XM EVC RTNOPTHY: CPT | Performed by: FAMILY MEDICINE

## 2025-04-16 PROCEDURE — 3074F SYST BP LT 130 MM HG: CPT | Performed by: FAMILY MEDICINE

## 2025-04-16 PROCEDURE — 4004F PT TOBACCO SCREEN RCVD TLK: CPT | Performed by: FAMILY MEDICINE

## 2025-04-16 PROCEDURE — 3044F HG A1C LEVEL LT 7.0%: CPT | Performed by: FAMILY MEDICINE

## 2025-04-16 PROCEDURE — 83036 HEMOGLOBIN GLYCOSYLATED A1C: CPT | Performed by: FAMILY MEDICINE

## 2025-04-16 PROCEDURE — 3078F DIAST BP <80 MM HG: CPT | Performed by: FAMILY MEDICINE

## 2025-04-16 RX ORDER — LIDOCAINE HYDROCHLORIDE 10 MG/ML
8 INJECTION, SOLUTION INFILTRATION; PERINEURAL ONCE
Status: COMPLETED | OUTPATIENT
Start: 2025-04-16 | End: 2025-04-16

## 2025-04-16 RX ORDER — METHYLPREDNISOLONE ACETATE 40 MG/ML
80 INJECTION, SUSPENSION INTRA-ARTICULAR; INTRALESIONAL; INTRAMUSCULAR; SOFT TISSUE ONCE
Status: COMPLETED | OUTPATIENT
Start: 2025-04-16 | End: 2025-04-16

## 2025-04-16 RX ADMIN — LIDOCAINE HYDROCHLORIDE 8 ML: 10 INJECTION, SOLUTION INFILTRATION; PERINEURAL at 17:05

## 2025-04-16 RX ADMIN — METHYLPREDNISOLONE ACETATE 80 MG: 40 INJECTION, SUSPENSION INTRA-ARTICULAR; INTRALESIONAL; INTRAMUSCULAR; SOFT TISSUE at 17:06

## 2025-04-16 NOTE — PROGRESS NOTES
[facility]  History and Physical  Shoulder Pain    Date:  2025    Name:  Josias Shafer  Address:  [address] [address]    :  1965      Age:   59 y.o.    SSN:  [id number]      Medical Record Number:  [id number]    Reason for Visit:    Shoulder Pain (NP RIGHT SHOULDER)      HPI:   Josias Shafer is a right hand dominant 59 y.o. male who presents to our office today complaining of  right shoulder pain. He states that he has had shoulder pain for several years that has been atraumatic in nature.  He states about a year ago he was evaluated by the surgeon and was given an injection.  This gave him significant amount of relief.  He states over the last several weeks where he started noticing pain as well as some limitations with range of motion.  He has not done any formal physical therapy or any recent injection.  He has had a history of a gastric sleeve but is still taking NSAIDs.  He denies any numbness or tingling.    Of note, patient works at [employer] and is a type II diabetic with a A1c of 6.7          Pain Assessment  Location of Pain: Shoulder  Location Modifiers: Right  Severity of Pain: 7  Quality of Pain: Sharp  Duration of Pain: Persistent  Frequency of Pain: Intermittent  Aggravating Factors:  (elevation)  Limiting Behavior: Yes  Relieving Factors: Rest  Result of Injury: No  Work-Related Injury: No  Are there other pain locations you wish to document?: No    Review of Systems:  A 14 point review of systems available in the scanned medical record as documented by the patient.  The review is negative with the exception of those things mentioned in the History of Present Illness and Past Medical History.      Past History:  Past Medical History:   Diagnosis Date    Arthritis     Diabetes mellitus type 2 in obese     DM type 2 (diabetes mellitus, type 2) (HCC)     HTN     HTN (hypertension)      replace inactive

## 2025-04-16 NOTE — PROGRESS NOTES
Josias Shafer is a 59 y.o. male.    HPI: Here for complex medical visit  Still works at [employer]  1/2 ppd tob  Not wearing compression stockings  Not exercising much  Meds, vitamins and allergies reviewed with pt    ROS: No TIA's or unusual headaches, no dysphagia.  No prolonged cough. No dyspnea or chest pain on exertion.  No abdominal pain, change in bowel habits, black or bloody stools.  No urinary tract symptoms.  No new or unusual musculoskeletal symptoms.       Prior to Visit Medications    Medication Sig Taking? Authorizing Provider   ONETOUCH ULTRA strip USE 1 STRIP  DAILY AS NEEDED Yes Jerzy Villalobos MD   gabapentin (NEURONTIN) 600 MG tablet Take 1.5 tablets by mouth 2 times daily for 360 days. Yes Jerzy Villalobos MD   meloxicam (MOBIC) 15 MG tablet Take 1 tablet by mouth daily 1 po qd Yes Mario Field MD   atorvastatin (LIPITOR) 20 MG tablet TAKE 1 TABLET DAILY Yes Jerzy Villalobos MD   JARDIANCE 25 MG tablet TAKE 1 TABLET DAILY Yes Jerzy Villalobos MD   losartan (COZAAR) 100 MG tablet TAKE 1 TABLET DAILY Yes Jerzy Villalobos MD   metFORMIN (GLUCOPHAGE-XR) 500 MG extended release tablet TAKE 1 TABLET DAILY WITH BREAKFAST Yes Jerzy Villalobos MD   tamsulosin (FLOMAX) 0.4 MG capsule TAKE 1 CAPSULE DAILY Yes Jrezy Villalobos MD   HYDROcodone-acetaminophen (NORCO) 5-325 MG per tablet Take 1-2 tablets by mouth every 4-6 hours as needed for Pain. Yes Mario Field MD   metoprolol succinate (TOPROL XL) 25 MG extended release tablet Take 1 tablet by mouth daily Yes Jerzy Vilallobos MD   tadalafil (CIALIS) 20 MG tablet Take 1 tablet by mouth daily as needed for Erectile Dysfunction Yes Jerzy Villalobos MD   Multiple Vitamins-Minerals (BARIATRIC FUSION) CHEW Take 3 tablets by mouth daily Yes Mario Field MD   gabapentin (NEURONTIN) 600 MG tablet Take 1.5 tablets by mouth 2 times daily for 30 days.  Jerzy Villalobos MD   ibuprofen (ADVIL;MOTRIN) 800 MG

## 2025-07-02 DIAGNOSIS — R39.198 DECREASED URINE STREAM: ICD-10-CM

## 2025-07-03 RX ORDER — TAMSULOSIN HYDROCHLORIDE 0.4 MG/1
0.4 CAPSULE ORAL DAILY
Qty: 90 CAPSULE | Refills: 3 | Status: SHIPPED | OUTPATIENT
Start: 2025-07-03

## 2025-07-03 RX ORDER — METOPROLOL SUCCINATE 25 MG/1
25 TABLET, EXTENDED RELEASE ORAL DAILY
Qty: 90 TABLET | Refills: 3 | Status: SHIPPED | OUTPATIENT
Start: 2025-07-03

## 2025-07-03 NOTE — TELEPHONE ENCOUNTER
Medication:   Requested Prescriptions     Pending Prescriptions Disp Refills    tamsulosin (FLOMAX) 0.4 MG capsule [Pharmacy Med Name: TAMSULOSIN HCL CAPS 0.4MG] 90 capsule 3     Sig: TAKE 1 CAPSULE DAILY    metoprolol succinate (TOPROL XL) 25 MG extended release tablet [Pharmacy Med Name: METOPROLOL SUCCINATE ER TABS 25MG] 90 tablet 3     Sig: TAKE 1 TABLET DAILY       Last Filled:      Patient Phone Number: [phone] (home)     Last appt: 4/16/2025   Next appt: Visit date not found    Lab Results   Component Value Date     06/25/2024    K 4.5 06/25/2024     06/25/2024    CO2 27 06/25/2024    BUN 14 06/25/2024    CREATININE 0.9 06/25/2024    GLUCOSE 132 (H) 06/25/2024    CALCIUM 9.3 06/25/2024    BILITOT 0.5 04/03/2024    ALKPHOS 165 (H) 04/03/2024    AST 25 04/03/2024    ALT 25 04/03/2024    LABGLOM >90 06/25/2024    GFRAA >60 01/13/2021    AGRATIO 1.4 04/03/2024    GLOB 2.6 01/13/2021

## 2025-08-11 ENCOUNTER — TELEPHONE (OUTPATIENT)
Dept: FAMILY MEDICINE CLINIC | Age: 60
End: 2025-08-11

## 2025-08-12 ENCOUNTER — OFFICE VISIT (OUTPATIENT)
Dept: FAMILY MEDICINE CLINIC | Age: 60
End: 2025-08-12
Payer: COMMERCIAL

## 2025-08-12 VITALS
HEIGHT: 70 IN | OXYGEN SATURATION: 97 % | WEIGHT: 262.6 LBS | TEMPERATURE: 98 F | DIASTOLIC BLOOD PRESSURE: 72 MMHG | BODY MASS INDEX: 37.59 KG/M2 | SYSTOLIC BLOOD PRESSURE: 112 MMHG | HEART RATE: 72 BPM

## 2025-08-12 DIAGNOSIS — I89.0 LYMPHEDEMA: Primary | ICD-10-CM

## 2025-08-12 PROCEDURE — 4004F PT TOBACCO SCREEN RCVD TLK: CPT | Performed by: FAMILY MEDICINE

## 2025-08-12 PROCEDURE — G8427 DOCREV CUR MEDS BY ELIG CLIN: HCPCS | Performed by: FAMILY MEDICINE

## 2025-08-12 PROCEDURE — 3074F SYST BP LT 130 MM HG: CPT | Performed by: FAMILY MEDICINE

## 2025-08-12 PROCEDURE — 3017F COLORECTAL CA SCREEN DOC REV: CPT | Performed by: FAMILY MEDICINE

## 2025-08-12 PROCEDURE — 99213 OFFICE O/P EST LOW 20 MIN: CPT | Performed by: FAMILY MEDICINE

## 2025-08-12 PROCEDURE — G8417 CALC BMI ABV UP PARAM F/U: HCPCS | Performed by: FAMILY MEDICINE

## 2025-08-12 PROCEDURE — 3078F DIAST BP <80 MM HG: CPT | Performed by: FAMILY MEDICINE

## 2025-08-12 RX ORDER — FUROSEMIDE 20 MG/1
20 TABLET ORAL DAILY PRN
Qty: 90 TABLET | Refills: 1 | Status: SHIPPED | OUTPATIENT
Start: 2025-08-12

## 2025-08-13 RX ORDER — METFORMIN HYDROCHLORIDE 500 MG/1
500 TABLET, EXTENDED RELEASE ORAL
Qty: 90 TABLET | Refills: 3 | Status: SHIPPED | OUTPATIENT
Start: 2025-08-13

## (undated) DEVICE — SPLINT QUICK STEP SCOTCHCAST 5 X 30

## (undated) DEVICE — Z DISCONTINUED USE 2218423 SUTURE ETHILON SZ 3-0 L18IN NONABSORBABLE BLK FS-1 L24MM 3/8 663H

## (undated) DEVICE — TRAP FLUID

## (undated) DEVICE — SET ORTH L13.5MM DIA3.5MM W/ DRL GUID DRL BIT TAP FOR DX
Type: IMPLANTABLE DEVICE | Site: ANKLE | Status: NON-FUNCTIONAL
Removed: 2024-06-27

## (undated) DEVICE — ROLL COT W11.5INXL11FT 1LB HIGHLY ABSRB SURG GRD

## (undated) DEVICE — 2108 SERIES SAGITTAL BLADE (9.1 X 0.64 X 35.2MM)

## (undated) DEVICE — PRECISION (9.0 X 0.51 X 25.0MM)

## (undated) DEVICE — SUTURE PDS + SZ 2 0 L27IN ABSRB VLT L26MM CT 2 1 2 CIR PDP333H

## (undated) DEVICE — NEPTUNE E-SEP SMOKE EVACUATION PENCIL, COATED, 70MM BLADE, PUSH BUTTON SWITCH: Brand: NEPTUNE E-SEP

## (undated) DEVICE — GLOVE SURG SZ 65 L12IN FNGR THK79MIL GRN LTX FREE

## (undated) DEVICE — TOURNIQUET SURGICAL EX LG BLACK WHITE HEMACLEAR

## (undated) DEVICE — DRESSING,GAUZE,XEROFORM,CURAD,1"X8",ST: Brand: CURAD

## (undated) DEVICE — SOLUTION IV 1000ML 0.9% SOD CHL

## (undated) DEVICE — SUTURE VICRYL + SZ 3-0 L27IN ABSRB UD L26MM SH 1/2 CIR VCP416H

## (undated) DEVICE — BANDAGE COBAN 4 IN COMPR W4INXL5YD FOAM COHESIVE QUIK STK SELF ADH SFT

## (undated) DEVICE — PODIATRY: Brand: MEDLINE INDUSTRIES, INC.

## (undated) DEVICE — DRAPE,U/ SHT,SPLIT,PLAS,STERIL: Brand: MEDLINE

## (undated) DEVICE — GLOVE SURG SZ 85 L1185IN FNGR THK75MIL STRW LTX POLYMER

## (undated) DEVICE — BANDAGE,ELASTIC,ESMARK,STERILE,6"X9',LF: Brand: MEDLINE

## (undated) DEVICE — GLOVE SURG SZ 65 THK91MIL LTX FREE SYN POLYISOPRENE

## (undated) DEVICE — TOWEL,OR,DSP,ST,BLUE,DLX,8/PK,10PK/CS: Brand: MEDLINE

## (undated) DEVICE — C-ARM PACK: Brand: C-ARM COVER